# Patient Record
Sex: FEMALE | Race: WHITE | Employment: OTHER | URBAN - NONMETROPOLITAN AREA
[De-identification: names, ages, dates, MRNs, and addresses within clinical notes are randomized per-mention and may not be internally consistent; named-entity substitution may affect disease eponyms.]

---

## 2022-05-05 ENCOUNTER — OFFICE VISIT (OUTPATIENT)
Age: 82
End: 2022-05-05
Payer: MEDICARE

## 2022-05-05 ENCOUNTER — HOSPITAL ENCOUNTER (OUTPATIENT)
Dept: GENERAL RADIOLOGY | Age: 82
Discharge: HOME | End: 2022-05-05
Payer: MEDICARE

## 2022-05-05 VITALS
SYSTOLIC BLOOD PRESSURE: 130 MMHG | TEMPERATURE: 97.1 F | WEIGHT: 123 LBS | HEART RATE: 75 BPM | DIASTOLIC BLOOD PRESSURE: 76 MMHG | BODY MASS INDEX: 22.63 KG/M2 | HEIGHT: 62 IN | OXYGEN SATURATION: 96 %

## 2022-05-05 DIAGNOSIS — R05.9 COUGH: ICD-10-CM

## 2022-05-05 DIAGNOSIS — Z11.52 ENCOUNTER FOR SCREENING FOR COVID-19: ICD-10-CM

## 2022-05-05 DIAGNOSIS — J06.9 UPPER RESPIRATORY TRACT INFECTION, UNSPECIFIED TYPE: ICD-10-CM

## 2022-05-05 DIAGNOSIS — R05.9 COUGH: Primary | ICD-10-CM

## 2022-05-05 LAB — SARS-COV-2, PCR: NOT DETECTED

## 2022-05-05 PROCEDURE — 4004F PT TOBACCO SCREEN RCVD TLK: CPT | Performed by: NURSE PRACTITIONER

## 2022-05-05 PROCEDURE — 99213 OFFICE O/P EST LOW 20 MIN: CPT | Performed by: NURSE PRACTITIONER

## 2022-05-05 PROCEDURE — 1123F ACP DISCUSS/DSCN MKR DOCD: CPT | Performed by: NURSE PRACTITIONER

## 2022-05-05 PROCEDURE — 1090F PRES/ABSN URINE INCON ASSESS: CPT | Performed by: NURSE PRACTITIONER

## 2022-05-05 PROCEDURE — G8400 PT W/DXA NO RESULTS DOC: HCPCS | Performed by: NURSE PRACTITIONER

## 2022-05-05 PROCEDURE — 71046 X-RAY EXAM CHEST 2 VIEWS: CPT

## 2022-05-05 PROCEDURE — 4040F PNEUMOC VAC/ADMIN/RCVD: CPT | Performed by: NURSE PRACTITIONER

## 2022-05-05 PROCEDURE — G8420 CALC BMI NORM PARAMETERS: HCPCS | Performed by: NURSE PRACTITIONER

## 2022-05-05 PROCEDURE — G8427 DOCREV CUR MEDS BY ELIG CLIN: HCPCS | Performed by: NURSE PRACTITIONER

## 2022-05-05 RX ORDER — MEMANTINE HYDROCHLORIDE 10 MG/1
TABLET ORAL
COMMUNITY
Start: 2022-04-21

## 2022-05-05 RX ORDER — PROPRANOLOL HYDROCHLORIDE 20 MG/1
TABLET ORAL
COMMUNITY
Start: 2022-03-17

## 2022-05-05 RX ORDER — TRAMADOL HYDROCHLORIDE 50 MG/1
TABLET ORAL
COMMUNITY
Start: 2022-04-22

## 2022-05-05 RX ORDER — METHYLPREDNISOLONE 4 MG/1
TABLET ORAL
Qty: 1 KIT | Refills: 0 | Status: SHIPPED | OUTPATIENT
Start: 2022-05-05 | End: 2022-05-11

## 2022-05-05 RX ORDER — EZETIMIBE 10 MG/1
TABLET ORAL
COMMUNITY
Start: 2022-03-15

## 2022-05-05 ASSESSMENT — ENCOUNTER SYMPTOMS
STRIDOR: 0
COUGH: 1
WHEEZING: 0
SHORTNESS OF BREATH: 0
VOICE CHANGE: 0
CHEST TIGHTNESS: 0
SORE THROAT: 0
GASTROINTESTINAL NEGATIVE: 1
RHINORRHEA: 0
TROUBLE SWALLOWING: 0
CHOKING: 0
COLOR CHANGE: 0
SINUS PRESSURE: 0
EYES NEGATIVE: 1

## 2022-05-05 NOTE — PATIENT INSTRUCTIONS
1. Will call with results of covid. 2. Quarantine until covid results confirmed  3. Dont take steroid unless covid negative. 4. Will call with results of chest xray for further treatment recommendations.

## 2022-05-05 NOTE — PROGRESS NOTES
[address]  [address] [facility] [id number]  Dept: [phone]  Dept Fax: [phone]  Loc: [phone]     Oxana Cedillo is a 80 y.o. female who presents today for her medical conditions/complaintsas noted below. Oxana Cedillo is c/o of Cough (onset yesterday ), Congestion, and Fatigue        HPI:     HPI    URI  This is a new problem. The current episode started in the past 7 days. The problem occurs constantly. The problem has been unchanged. Associated symptoms include congestion, coughing and a sore throat. Pertinent negatives include  abdominal pain, anorexia, arthralgias, change in bowel habit, chest pain, chills, diaphoresis, fatigue, fever, headaches, joint swelling, myalgias, nausea, neck pain, numbness, rash, swollen glands, urinary symptoms, vertigo, visual change, vomiting or weakness. Pt tried OTC meds with mild symptom relief. Denies any other symptoms. No past medical history on file. No past surgical history on file. No family history on file. Social History     Tobacco Use    Smoking status: Not on file    Smokeless tobacco: Not on file   Substance Use Topics    Alcohol use: Not on file      Current Outpatient Medications   Medication Sig Dispense Refill    ezetimibe (ZETIA) 10 MG tablet       memantine (NAMENDA) 10 MG tablet       propranolol (INDERAL) 20 MG tablet       traMADol (ULTRAM) 50 MG tablet       methylPREDNISolone (MEDROL DOSEPACK) 4 MG tablet Take by mouth. 1 kit 0     No current facility-administered medications for this visit.      Allergies   Allergen Reactions    Sulfa Antibiotics        Health Maintenance   Topic Date Due    COVID-19 Vaccine (1) Never done    Depression Screen  Never done    Hepatitis C screen  Never done    DTaP/Tdap/Td vaccine (1 - Tdap) Never done    Shingles vaccine (1 of 2) Never done    DEXA (modify frequency per FRAX score)  Never done    Pneumococcal 65+ years Vaccine (1 - PCV) Never done    Flu vaccine (Season Ended) 09/01/2022    Hepatitis A vaccine  Aged Out    Hepatitis B vaccine  Aged Out    Hib vaccine  Aged Out    Meningococcal (ACWY) vaccine  Aged Out       Subjective:     Review of Systems   Constitutional: Positive for fatigue. Negative for fever. HENT: Positive for congestion. Negative for rhinorrhea, sinus pressure, sneezing, sore throat, trouble swallowing and voice change. Eyes: Negative. Respiratory: Positive for cough. Negative for choking, chest tightness, shortness of breath, wheezing and stridor. Cardiovascular: Negative. Gastrointestinal: Negative. Endocrine: Negative. Genitourinary: Negative. Musculoskeletal: Negative. Skin: Negative for color change. Neurological: Negative. Hematological: Negative. Psychiatric/Behavioral: Negative. Objective:     Physical Exam  Vitals and nursing note reviewed. Constitutional:       General: She is not in acute distress. Appearance: Normal appearance. She is well-developed. She is not ill-appearing or toxic-appearing. HENT:      Head: Normocephalic and atraumatic. Right Ear: Hearing, tympanic membrane, ear canal and external ear normal.      Left Ear: Hearing, tympanic membrane, ear canal and external ear normal.      Nose: Nose normal.      Right Sinus: No maxillary sinus tenderness or frontal sinus tenderness. Left Sinus: No maxillary sinus tenderness or frontal sinus tenderness. Mouth/Throat:      Lips: Pink. Mouth: Mucous membranes are moist.      Pharynx: Oropharynx is clear. Tonsils: 0 on the right. 0 on the left. Eyes:      Conjunctiva/sclera: Conjunctivae normal.      Pupils: Pupils are equal, round, and reactive to light. Neck:      Thyroid: No thyroid mass. Trachea: Trachea normal.   Cardiovascular:      Rate and Rhythm: Normal rate and regular rhythm. Heart sounds: Normal heart sounds. Pulmonary:      Effort: Pulmonary effort is normal.      Breath sounds: Normal breath sounds. Abdominal:      General: Bowel sounds are normal.      Palpations: Abdomen is soft. Musculoskeletal:         General: Normal range of motion. Cervical back: Normal range of motion. Lymphadenopathy:      Head:      Right side of head: No submental, submandibular, tonsillar, preauricular, posterior auricular or occipital adenopathy. Left side of head: No submental, submandibular, tonsillar, preauricular, posterior auricular or occipital adenopathy. Skin:     General: Skin is warm and moist.      Capillary Refill: Capillary refill takes less than 2 seconds. Neurological:      Mental Status: She is alert and oriented to person, place, and time. Psychiatric:         Speech: Speech normal.         Behavior: Behavior normal.         Thought Content: Thought content normal.         Judgment: Judgment normal.       /76   Pulse 75   Temp 97.1 °F (36.2 °C) (Temporal)   Ht 5' 2\" (1.575 m)   Wt 123 lb (55.8 kg)   SpO2 96%   BMI 22.50 kg/m²     Assessment:          Diagnosis Orders   1. Cough  XR CHEST STANDARD (2 VW)    methylPREDNISolone (MEDROL DOSEPACK) 4 MG tablet   2. Encounter for screening for COVID-19  COVID-19   3. Upper respiratory tract infection, unspecified type         Plan:      Orders Placed This Encounter   Procedures    XR CHEST STANDARD (2 VW)     Standing Status:   Future     Number of Occurrences:   1     Standing Expiration Date:   5/5/2023    COVID-19     Scheduling Instructions:      1) Due to current limited availability of the COVID-19 test, tests will be prioritized based on responses to questions above. Testing may be delayed due to volume.             2) Print and instruct patient to adhere to [facility] home isolation program. (Link Above)              3) Set up or refer patient for a monitoring program.              4) Have patient sign up for and leverage [employer] (if not previously done). Order Specific Question:   Is this test for diagnosis or screening? Answer:   Screening     Order Specific Question:   Symptomatic for COVID-19 as defined by CDC? Answer:   No     Order Specific Question:   Date of Symptom Onset     Answer:   N/A     Order Specific Question:   Hospitalized for COVID-19? Answer:   No     Order Specific Question:   Admitted to ICU for COVID-19? Answer:   No     Order Specific Question:   Employed in healthcare setting? Answer:   Unknown     Order Specific Question:   Resident in a congregate (group) care setting? Answer:   Unknown     Order Specific Question:   Pregnant? Answer:   No     Order Specific Question:   Previously tested for COVID-19? Answer:   Unknown    COVID-19    COVID-19        No follow-ups on file. Orders Placed This Encounter   Procedures    XR CHEST STANDARD (2 VW)     Standing Status:   Future     Number of Occurrences:   1     Standing Expiration Date:   5/5/2023    COVID-19     Scheduling Instructions:      1) Due to current limited availability of the COVID-19 test, tests will be prioritized based on responses to questions above. Testing may be delayed due to volume. 2) Print and instruct patient to adhere to CDC home isolation program. (Link Above)              3) Set up or refer patient for a monitoring program.              4) Have patient sign up for and leverage [id number] (if not previously done). Order Specific Question:   Is this test for diagnosis or screening? Answer:   Screening     Order Specific Question:   Symptomatic for COVID-19 as defined by CDC? Answer:   No     Order Specific Question:   Date of Symptom Onset     Answer:   N/A     Order Specific Question:   Hospitalized for COVID-19? Answer:   No     Order Specific Question:   Admitted to ICU for COVID-19? Answer:   No     Order Specific Question:   Employed in healthcare setting?      Answer:   Unknown Order Specific Question:   Resident in a congregate (group) care setting? Answer:   Unknown     Order Specific Question:   Pregnant? Answer:   No     Order Specific Question:   Previously tested for COVID-19? Answer:   Unknown    COVID-19    COVID-19     Orders Placed This Encounter   Medications    methylPREDNISolone (MEDROL DOSEPACK) 4 MG tablet     Sig: Take by mouth. Dispense:  1 kit     Refill:  0       Patient given educationalmaterials - see patient instructions. Discussed use, benefit, and side effectsof prescribed medications. All patient questions answered. Pt voiced understanding. Reviewed health maintenance. Instructed to continue current medications, diet andexercise. Patient agreed with treatment plan. Follow up as directed. Patient Instructions   1. Will call with results of covid. 2. Quarantine until covid results confirmed  3. Dont take steroid unless covid negative. 4. Will call with results of chest xray for further treatment recommendations.            Electronically signed by SALTY Ashford CNP on 5/5/2022 at 3:38 PM

## 2022-06-24 ENCOUNTER — OFFICE VISIT (OUTPATIENT)
Age: 82
End: 2022-06-24
Payer: MEDICARE

## 2022-06-24 VITALS
RESPIRATION RATE: 18 BRPM | WEIGHT: 122 LBS | BODY MASS INDEX: 22.45 KG/M2 | HEART RATE: 63 BPM | DIASTOLIC BLOOD PRESSURE: 66 MMHG | OXYGEN SATURATION: 96 % | SYSTOLIC BLOOD PRESSURE: 116 MMHG | HEIGHT: 62 IN | TEMPERATURE: 97.1 F

## 2022-06-24 DIAGNOSIS — H66.92 LEFT ACUTE OTITIS MEDIA: Primary | ICD-10-CM

## 2022-06-24 PROCEDURE — 1036F TOBACCO NON-USER: CPT | Performed by: NURSE PRACTITIONER

## 2022-06-24 PROCEDURE — G8400 PT W/DXA NO RESULTS DOC: HCPCS | Performed by: NURSE PRACTITIONER

## 2022-06-24 PROCEDURE — 99213 OFFICE O/P EST LOW 20 MIN: CPT | Performed by: NURSE PRACTITIONER

## 2022-06-24 PROCEDURE — 1123F ACP DISCUSS/DSCN MKR DOCD: CPT | Performed by: NURSE PRACTITIONER

## 2022-06-24 PROCEDURE — G8427 DOCREV CUR MEDS BY ELIG CLIN: HCPCS | Performed by: NURSE PRACTITIONER

## 2022-06-24 PROCEDURE — 1090F PRES/ABSN URINE INCON ASSESS: CPT | Performed by: NURSE PRACTITIONER

## 2022-06-24 PROCEDURE — G8420 CALC BMI NORM PARAMETERS: HCPCS | Performed by: NURSE PRACTITIONER

## 2022-06-24 RX ORDER — SIMETHICONE 125 MG
3 CAPSULE ORAL
COMMUNITY

## 2022-06-24 RX ORDER — AMOXICILLIN AND CLAVULANATE POTASSIUM 875; 125 MG/1; MG/1
1 TABLET, FILM COATED ORAL 2 TIMES DAILY
Qty: 20 TABLET | Refills: 0 | Status: SHIPPED | OUTPATIENT
Start: 2022-06-24 | End: 2022-07-04

## 2022-06-24 RX ORDER — IBUPROFEN 200 MG
1900 CAPSULE ORAL
COMMUNITY

## 2022-06-24 RX ORDER — OMEGA-3/DHA/EPA/FISH OIL 300-1000MG
CAPSULE ORAL DAILY
COMMUNITY

## 2022-06-24 RX ORDER — LEVOCETIRIZINE DIHYDROCHLORIDE 2.5 MG/5ML
SOLUTION ORAL DAILY
COMMUNITY

## 2022-06-24 RX ORDER — ALBUTEROL SULFATE 2.5 MG/3ML
2.5 SOLUTION RESPIRATORY (INHALATION) EVERY 6 HOURS PRN
COMMUNITY
End: 2022-07-25 | Stop reason: ALTCHOICE

## 2022-06-24 ASSESSMENT — ENCOUNTER SYMPTOMS
COUGH: 0
SORE THROAT: 0

## 2022-06-24 NOTE — PROGRESS NOTES
[address]  [address] [facility] [address] [address]  Dept: [phone]  Dept Fax: [phone]  Loc: [phone]    Josie Galvan is a 80 y.o. female who presents today for her medical conditions/complaintsas noted below. Josie Galvan is c/o of Otalgia (left side,facial swelling)        HPI:     Otalgia   There is pain in the left ear. This is a new problem. Episode onset: 3 days. The problem occurs constantly. The problem has been unchanged. There has been no fever. The pain is moderate. Pertinent negatives include no coughing, ear discharge or sore throat. Treatments tried: flonase and antihistamine. The treatment provided no relief. History reviewed. No pertinent past medical history. History reviewed. No pertinent surgical history. History reviewed. No pertinent family history.     Social History     Tobacco Use    Smoking status: Never Smoker    Smokeless tobacco: Never Used   Substance Use Topics    Alcohol use: Not on file      Current Outpatient Medications   Medication Sig Dispense Refill    Alpha-Lipoic Acid 100 MG TABS Take 200 mg by mouth every morning      calcium citrate (CALCITRATE) 950 (200 Ca) MG tablet Take 1,900 mg by mouth      Apoaequorin 10 MG CAPS Take 70 mg by mouth daily      Levocetirizine Dihydrochloride 2.5 MG/5ML SOLN Take by mouth daily      fish oil-omega-3 fatty acids 1000 MG capsule Take by mouth daily      Peppermint Oil 90 MG CPCR Take by mouth 2 times daily      Simethicone 125 MG CAPS Take 3 tablets by mouth      amoxicillin-clavulanate (AUGMENTIN) 875-125 MG per tablet Take 1 tablet by mouth 2 times daily for 10 days 20 tablet 0    ezetimibe (ZETIA) 10 MG tablet       memantine (NAMENDA) 10 MG tablet       propranolol (INDERAL) 20 MG tablet       traMADol (ULTRAM) 50 MG tablet       albuterol (PROVENTIL) (2.5 MG/3ML) 0.083% nebulizer solution Inhale 2.5 mg into the lungs every 6 hours as needed (Patient not taking: Reported on 6/24/2022)       No current facility-administered medications for this visit. Allergies   Allergen Reactions    Sulfa Antibiotics        Health Maintenance   Topic Date Due    Annual Wellness Visit (AWV)  Never done    Depression Screen  Never done    Hepatitis C screen  Never done    DTaP/Tdap/Td vaccine (1 - Tdap) Never done    Shingles vaccine (1 of 2) Never done    DEXA (modify frequency per FRAX score)  Never done    Pneumococcal 65+ years Vaccine (2 - PPSV23 or PCV20) 06/09/2019    COVID-19 Vaccine (3 - Booster for Moderna series) 07/18/2021    Flu vaccine (Season Ended) 09/01/2022    Hepatitis A vaccine  Aged Out    Hepatitis B vaccine  Aged Out    Hib vaccine  Aged Out    Meningococcal (ACWY) vaccine  Aged Out       Subjective:     Review of Systems   Constitutional: Negative for chills and fever. HENT: Positive for congestion and ear pain. Negative for ear discharge and sore throat. Respiratory: Negative for cough. All other systems reviewed and are negative.      :Objective      Physical Exam  Vitals and nursing note reviewed. Constitutional:       General: She is not in acute distress. Appearance: Normal appearance. She is well-developed. She is not ill-appearing or diaphoretic. HENT:      Head: Normocephalic and atraumatic. Right Ear: Ear canal and external ear normal. A middle ear effusion is present. Left Ear: Ear canal and external ear normal. A middle ear effusion is present. Tympanic membrane is erythematous. Eyes:      Pupils: Pupils are equal, round, and reactive to light. Cardiovascular:      Rate and Rhythm: Normal rate and regular rhythm. Heart sounds: Normal heart sounds. No murmur heard. Pulmonary:      Effort: Pulmonary effort is normal. No respiratory distress. Breath sounds: Normal breath sounds. No wheezing. Musculoskeletal:      Cervical back: Normal range of motion.    Skin: General: Skin is warm and dry. Findings: No rash. Neurological:      Mental Status: She is alert and oriented to person, place, and time. Psychiatric:         Behavior: Behavior normal.       /66   Pulse 63   Temp 97.1 °F (36.2 °C)   Resp 18   Ht 5' 2\" (1.575 m)   Wt 122 lb (55.3 kg)   SpO2 96%   BMI 22.31 kg/m²     :Assessment       Diagnosis Orders   1. Left acute otitis media         :Plan   1. Take full course of antibiotics  2. Monitor for fever and treat as needed  3. If patient is not improving or developing any new/worsening symptoms then return to clinic as needed or follow up with PCP     No orders of the defined types were placed in this encounter. No follow-ups on file. Orders Placed This Encounter   Medications    amoxicillin-clavulanate (AUGMENTIN) 875-125 MG per tablet     Sig: Take 1 tablet by mouth 2 times daily for 10 days     Dispense:  20 tablet     Refill:  0       Patient given educational materials- see patient instructions. Discussed use, benefit, and side effects of prescribedmedications. All patient questions answered. Pt voiced understanding. Patient Instructions       Patient Education        Ear Infection (Otitis Media): Care Instructions  Overview     An ear infection may start with a cold and affect the middle ear (otitis media). It can hurt a lot. Most ear infections clear up on their own in a couple of days and do not need antibiotics. Also, antibiotics do not work against viruses, which may be the cause of your infection. Regular doses ofpain relievers are the best way to reduce your fever and help you feel better. Follow-up care is a key part of your treatment and safety. Be sure to make and go to all appointments, and call your doctor if you are having problems. It's also a good idea to know your test results and keep alist of the medicines you take. How can you care for yourself at home?  Take pain medicines exactly as directed.   ? If the doctor gave you a prescription medicine for pain, take it as prescribed. ? If you are not taking a prescription pain medicine, take an over-the-counter medicine, such as acetaminophen (Tylenol), ibuprofen (Advil, Motrin), or naproxen (Aleve). Read and follow all instructions on the label. ? Do not take two or more pain medicines at the same time unless the doctor told you to. Many pain medicines have acetaminophen, which is Tylenol. Too much acetaminophen (Tylenol) can be harmful.  Plan to take a full dose of pain reliever before bedtime. Getting enough sleep will help you get better.  Try a warm, moist washcloth on the ear. It may help relieve pain.  If your doctor prescribed antibiotics, take them as directed. Do not stop taking them just because you feel better. You need to take the full course of antibiotics. When should you call for help? Call your doctor now or seek immediate medical care if:     You have new or increasing ear pain.      You have new or increasing pus or blood draining from your ear.      You have a fever with a stiff neck or a severe headache. Watch closely for changes in your health, and be sure to contact your doctor if:     You have new or worse symptoms.      You are not getting better after taking an antibiotic for 2 days. Where can you learn more? Go to https://[employer]ed[employer]world.[employer]. org and sign in to your [employer] account. Enter F165 in the [facility] box to learn more about \"Ear Infection (Otitis Media): Care Instructions. \"     If you do not have an account, please click on the \"Sign Up Now\" link. Current as of: September 8, 2021               Content Version: 13.3  © 5186-6254 Healthwise, Incorporated. Care instructions adapted under license by [facility] ([facility]).  If you have questions about a medical condition or this instruction, always ask your healthcare professional. [address]  any warranty or liability for your use of this information. 1. Take full course of antibiotics  2. Monitor for fever and treat as needed  3.  If patient is not improving or developing any new/worsening symptoms then return to clinic as needed or follow up with PCP             Electronically signed by SALTY Armendariz on 6/24/2022 at 8:21 AM

## 2022-06-24 NOTE — PATIENT INSTRUCTIONS
Patient Education        Ear Infection (Otitis Media): Care Instructions  Overview     An ear infection may start with a cold and affect the middle ear (otitis media). It can hurt a lot. Most ear infections clear up on their own in a couple of days and do not need antibiotics. Also, antibiotics do not work against viruses, which may be the cause of your infection. Regular doses ofpain relievers are the best way to reduce your fever and help you feel better. Follow-up care is a key part of your treatment and safety. Be sure to make and go to all appointments, and call your doctor if you are having problems. It's also a good idea to know your test results and keep alist of the medicines you take. How can you care for yourself at home?  Take pain medicines exactly as directed. ? If the doctor gave you a prescription medicine for pain, take it as prescribed. ? If you are not taking a prescription pain medicine, take an over-the-counter medicine, such as acetaminophen (Tylenol), ibuprofen (Advil, Motrin), or naproxen (Aleve). Read and follow all instructions on the label. ? Do not take two or more pain medicines at the same time unless the doctor told you to. Many pain medicines have acetaminophen, which is Tylenol. Too much acetaminophen (Tylenol) can be harmful.  Plan to take a full dose of pain reliever before bedtime. Getting enough sleep will help you get better.  Try a warm, moist washcloth on the ear. It may help relieve pain.  If your doctor prescribed antibiotics, take them as directed. Do not stop taking them just because you feel better. You need to take the full course of antibiotics. When should you call for help? Call your doctor now or seek immediate medical care if:     You have new or increasing ear pain.      You have new or increasing pus or blood draining from your ear.      You have a fever with a stiff neck or a severe headache.    Watch closely for changes in your health, and be sure to contact your doctor if:     You have new or worse symptoms.      You are not getting better after taking an antibiotic for 2 days. Where can you learn more? Go to https://chpepiceweb.[employer]. org and sign in to your [employer] account. Enter Q415 in the [facility] box to learn more about \"Ear Infection (Otitis Media): Care Instructions. \"     If you do not have an account, please click on the \"Sign Up Now\" link. Current as of: September 8, 2021               Content Version: 13.3  © 3864-9481 Healthwise, Incorporated. Care instructions adapted under license by [facility] ([facility]). If you have questions about a medical condition or this instruction, always ask your healthcare professional. [address] any warranty or liability for your use of this information. 1. Take full course of antibiotics  2. Monitor for fever and treat as needed  3.  If patient is not improving or developing any new/worsening symptoms then return to clinic as needed or follow up with PCP

## 2022-07-25 ENCOUNTER — OFFICE VISIT (OUTPATIENT)
Dept: PRIMARY CARE CLINIC | Age: 82
End: 2022-07-25
Payer: MEDICARE

## 2022-07-25 VITALS
OXYGEN SATURATION: 96 % | WEIGHT: 127 LBS | BODY MASS INDEX: 23.37 KG/M2 | HEIGHT: 62 IN | SYSTOLIC BLOOD PRESSURE: 130 MMHG | HEART RATE: 74 BPM | DIASTOLIC BLOOD PRESSURE: 70 MMHG

## 2022-07-25 DIAGNOSIS — M48.061 NEURAL FORAMINAL STENOSIS OF LUMBAR SPINE: ICD-10-CM

## 2022-07-25 DIAGNOSIS — Z13.29 SCREENING FOR THYROID DISORDER: ICD-10-CM

## 2022-07-25 DIAGNOSIS — Z13.220 SCREENING FOR LIPID DISORDERS: ICD-10-CM

## 2022-07-25 DIAGNOSIS — Z13.0 SCREENING FOR DEFICIENCY ANEMIA: ICD-10-CM

## 2022-07-25 DIAGNOSIS — J06.9 VIRAL UPPER RESPIRATORY TRACT INFECTION: Primary | ICD-10-CM

## 2022-07-25 DIAGNOSIS — K44.9 HIATAL HERNIA WITH GASTROESOPHAGEAL REFLUX: ICD-10-CM

## 2022-07-25 DIAGNOSIS — K21.9 HIATAL HERNIA WITH GASTROESOPHAGEAL REFLUX: ICD-10-CM

## 2022-07-25 DIAGNOSIS — M81.0 AGE-RELATED OSTEOPOROSIS WITHOUT CURRENT PATHOLOGICAL FRACTURE: ICD-10-CM

## 2022-07-25 DIAGNOSIS — Z13.1 DIABETES MELLITUS SCREENING: ICD-10-CM

## 2022-07-25 DIAGNOSIS — E78.2 MIXED HYPERLIPIDEMIA: ICD-10-CM

## 2022-07-25 DIAGNOSIS — E55.9 HYPOVITAMINOSIS D: ICD-10-CM

## 2022-07-25 DIAGNOSIS — D18.03 HEPATIC HEMANGIOMA: ICD-10-CM

## 2022-07-25 PROBLEM — E87.1 HYPONATREMIA: Status: ACTIVE | Noted: 2021-07-27

## 2022-07-25 PROBLEM — Q25.72: Status: ACTIVE | Noted: 2018-02-13

## 2022-07-25 PROBLEM — M47.816 FACET ARTHRITIS OF LUMBAR REGION: Status: ACTIVE | Noted: 2018-12-18

## 2022-07-25 PROBLEM — J30.9 ALLERGIC RHINITIS: Status: ACTIVE | Noted: 2021-01-11

## 2022-07-25 PROCEDURE — G8400 PT W/DXA NO RESULTS DOC: HCPCS | Performed by: INTERNAL MEDICINE

## 2022-07-25 PROCEDURE — 99203 OFFICE O/P NEW LOW 30 MIN: CPT | Performed by: INTERNAL MEDICINE

## 2022-07-25 PROCEDURE — 1090F PRES/ABSN URINE INCON ASSESS: CPT | Performed by: INTERNAL MEDICINE

## 2022-07-25 PROCEDURE — G8427 DOCREV CUR MEDS BY ELIG CLIN: HCPCS | Performed by: INTERNAL MEDICINE

## 2022-07-25 PROCEDURE — 1123F ACP DISCUSS/DSCN MKR DOCD: CPT | Performed by: INTERNAL MEDICINE

## 2022-07-25 PROCEDURE — 1036F TOBACCO NON-USER: CPT | Performed by: INTERNAL MEDICINE

## 2022-07-25 PROCEDURE — G8420 CALC BMI NORM PARAMETERS: HCPCS | Performed by: INTERNAL MEDICINE

## 2022-07-25 SDOH — ECONOMIC STABILITY: FOOD INSECURITY: WITHIN THE PAST 12 MONTHS, THE FOOD YOU BOUGHT JUST DIDN'T LAST AND YOU DIDN'T HAVE MONEY TO GET MORE.: NEVER TRUE

## 2022-07-25 SDOH — ECONOMIC STABILITY: FOOD INSECURITY: WITHIN THE PAST 12 MONTHS, YOU WORRIED THAT YOUR FOOD WOULD RUN OUT BEFORE YOU GOT MONEY TO BUY MORE.: NEVER TRUE

## 2022-07-25 ASSESSMENT — PATIENT HEALTH QUESTIONNAIRE - PHQ9
1. LITTLE INTEREST OR PLEASURE IN DOING THINGS: 0
2. FEELING DOWN, DEPRESSED OR HOPELESS: 0
SUM OF ALL RESPONSES TO PHQ9 QUESTIONS 1 & 2: 0
SUM OF ALL RESPONSES TO PHQ QUESTIONS 1-9: 0

## 2022-07-25 ASSESSMENT — SOCIAL DETERMINANTS OF HEALTH (SDOH): HOW HARD IS IT FOR YOU TO PAY FOR THE VERY BASICS LIKE FOOD, HOUSING, MEDICAL CARE, AND HEATING?: NOT HARD AT ALL

## 2022-07-25 NOTE — PROGRESS NOTES
Zaynab Stokes ( 1940) is a 80 y.o. female, NEW  for evaluation of the following chief complaint(s). New Patient (Dr Dequan Genao doctor [facility]))      Patient was encouraged and advised to be compliant with all  medications leads an active lifestyle and promote maintaining a healthy weight, encouraged not to use cigarettes, laboratory results discussed and reviewed with patient's during this visit   ASSESSMENT/PLAN:  Problem List          High    Hyperlipidemia      Well-controlled, continue current medications, medication adherence emphasized and lifestyle modifications recommended         Relevant Medications    ezetimibe (ZETIA) 10 MG tablet    propranolol (INDERAL) 20 MG tablet    Hiatal hernia with gastroesophageal reflux      Well-controlled, continue current medications         Relevant Medications    Simethicone 125 MG CAPS    Hepatic hemangioma      Asymptomatic, will observe         Age-related osteoporosis without current pathological fracture      Asymptomatic, continue current medications         Relevant Orders    COVID-19       Medium    Neural foraminal stenosis of lumbar spine    Relevant Orders    External Referral To Pain Clinic         No flowsheet data found. PHQ Scores 2022   PHQ2 Score 0   PHQ9 Score 0       Results for orders placed or performed in visit on 22   COVID-19   Result Value Ref Range    SARS-CoV-2, PCR Not Detected Not Detected       No follow-ups on file. HPI  NPV  Relocated from [facility], now here  She is independent, performs ADL's and IADL's,   Here to establish care  She has not seen an MD for sometime  She like to take homeopathic remedies  Had probiotic yesterday, and was awakened by spasm,   Review of Systems   Constitutional:  Negative for activity change, chills, fatigue and fever.    HENT:  Negative for congestion, ear pain, hearing loss, mouth sores, nosebleeds, postnasal drip, rhinorrhea, sinus pain, sore throat and trouble swallowing. Eyes:  Negative for visual disturbance. Respiratory:  Negative for apnea, cough, chest tightness, shortness of breath and wheezing. Cardiovascular:  Negative for chest pain, palpitations and leg swelling. Gastrointestinal:  Negative for abdominal distention, abdominal pain, blood in stool, constipation, diarrhea, nausea and vomiting. Heartburn   Endocrine: Negative for cold intolerance, heat intolerance and polyuria. Genitourinary:  Negative for difficulty urinating, dysuria, flank pain, frequency and urgency. Musculoskeletal:  Positive for back pain. Negative for arthralgias and myalgias. Skin:  Negative for rash and wound. Allergic/Immunologic: Negative for environmental allergies and food allergies. Neurological:  Negative for dizziness, tremors, seizures, syncope, speech difficulty, weakness, light-headedness, numbness and headaches. Hematological:  Negative for adenopathy. Does not bruise/bleed easily. Psychiatric/Behavioral:  Negative for confusion, decreased concentration, dysphoric mood, sleep disturbance and suicidal ideas. The patient is not nervous/anxious and is not hyperactive. Physical Exam  Vitals and nursing note reviewed. Constitutional:       General: She is not in acute distress. Appearance: Normal appearance. HENT:      Head: Normocephalic. Right Ear: Ear canal and external ear normal.      Left Ear: Ear canal and external ear normal.      Nose: Nose normal. No congestion or rhinorrhea. Mouth/Throat:      Mouth: Mucous membranes are moist.      Pharynx: No oropharyngeal exudate or posterior oropharyngeal erythema. Eyes:      General: No scleral icterus. Right eye: No discharge. Left eye: No discharge. Extraocular Movements: Extraocular movements intact. Conjunctiva/sclera: Conjunctivae normal.      Pupils: Pupils are equal, round, and reactive to light. Neck:      Thyroid: No thyromegaly.       Vascular: No carotid bruit or JVD. Cardiovascular:      Rate and Rhythm: Normal rate and regular rhythm. Chest Wall: PMI is not displaced. Pulses: Normal pulses. Heart sounds: Normal heart sounds, S1 normal and S2 normal. No murmur heard. Pulmonary:      Effort: Pulmonary effort is normal. No respiratory distress. Breath sounds: Normal breath sounds and air entry. No decreased air movement or transmitted upper airway sounds. No decreased breath sounds, wheezing or rales. Abdominal:      General: Abdomen is flat. Bowel sounds are normal. There is no distension. Palpations: Abdomen is soft. There is no shifting dullness, hepatomegaly, splenomegaly or mass. Tenderness: There is no abdominal tenderness. There is no right CVA tenderness, left CVA tenderness, guarding or rebound. Hernia: No hernia is present. Musculoskeletal:         General: Tenderness present. No deformity. Normal range of motion. Right shoulder: Normal.      Left shoulder: Normal.      Right elbow: Normal.      Left elbow: Normal.      Right wrist: Normal.      Left wrist: Normal.      Cervical back: Normal, normal range of motion and neck supple. No rigidity. Thoracic back: Normal.      Lumbar back: Normal.      Right knee: Normal.      Left knee: Normal.      Right lower leg: No edema. Left lower leg: No edema. Right ankle: Normal.      Left ankle: Normal.   Lymphadenopathy:      Cervical: No cervical adenopathy. Skin:     General: Skin is warm and moist.      Findings: No lesion or rash. Neurological:      Mental Status: She is alert. Mental status is at baseline. Cranial Nerves: Cranial nerves are intact. No cranial nerve deficit. Motor: Motor function is intact. No weakness. Coordination: Coordination is intact. Gait: Gait is intact.  Gait normal.      Deep Tendon Reflexes: Reflexes normal.   Psychiatric:         Attention and Perception: Attention normal.         Speech: Speech normal.         Behavior: Behavior normal.         Thought Content:  Thought content normal.         Judgment: Judgment normal.         (Time Documentation Optional [id number])    An electronic signaturewaas used to authenticate this note  -Shreyas Sanders MD on 7/31/2022 at 4:10 PM

## 2022-07-31 ASSESSMENT — ENCOUNTER SYMPTOMS
NAUSEA: 0
RHINORRHEA: 0
SORE THROAT: 0
ABDOMINAL PAIN: 0
ABDOMINAL DISTENTION: 0
BLOOD IN STOOL: 0
BACK PAIN: 1
VOMITING: 0
SHORTNESS OF BREATH: 0
COUGH: 0
APNEA: 0
DIARRHEA: 0
CONSTIPATION: 0
TROUBLE SWALLOWING: 0
WHEEZING: 0
SINUS PAIN: 0
CHEST TIGHTNESS: 0

## 2022-08-25 ENCOUNTER — OFFICE VISIT (OUTPATIENT)
Dept: PRIMARY CARE CLINIC | Age: 82
End: 2022-08-25
Payer: MEDICARE

## 2022-08-25 VITALS
DIASTOLIC BLOOD PRESSURE: 74 MMHG | OXYGEN SATURATION: 95 % | WEIGHT: 127 LBS | TEMPERATURE: 98.6 F | HEART RATE: 64 BPM | BODY MASS INDEX: 23.37 KG/M2 | HEIGHT: 62 IN | SYSTOLIC BLOOD PRESSURE: 128 MMHG

## 2022-08-25 DIAGNOSIS — Z23 NEED FOR VACCINATION: ICD-10-CM

## 2022-08-25 DIAGNOSIS — H92.01 OTALGIA OF RIGHT EAR: ICD-10-CM

## 2022-08-25 DIAGNOSIS — S40.022A CONTUSION OF LEFT UPPER EXTREMITY, INITIAL ENCOUNTER: Primary | ICD-10-CM

## 2022-08-25 PROCEDURE — G8420 CALC BMI NORM PARAMETERS: HCPCS | Performed by: FAMILY MEDICINE

## 2022-08-25 PROCEDURE — 90471 IMMUNIZATION ADMIN: CPT | Performed by: FAMILY MEDICINE

## 2022-08-25 PROCEDURE — 1123F ACP DISCUSS/DSCN MKR DOCD: CPT | Performed by: FAMILY MEDICINE

## 2022-08-25 PROCEDURE — 90750 HZV VACC RECOMBINANT IM: CPT | Performed by: FAMILY MEDICINE

## 2022-08-25 PROCEDURE — G8427 DOCREV CUR MEDS BY ELIG CLIN: HCPCS | Performed by: FAMILY MEDICINE

## 2022-08-25 PROCEDURE — 99213 OFFICE O/P EST LOW 20 MIN: CPT | Performed by: FAMILY MEDICINE

## 2022-08-25 PROCEDURE — G8400 PT W/DXA NO RESULTS DOC: HCPCS | Performed by: FAMILY MEDICINE

## 2022-08-25 PROCEDURE — 1036F TOBACCO NON-USER: CPT | Performed by: FAMILY MEDICINE

## 2022-08-25 PROCEDURE — 1090F PRES/ABSN URINE INCON ASSESS: CPT | Performed by: FAMILY MEDICINE

## 2022-08-25 ASSESSMENT — ENCOUNTER SYMPTOMS
EYES NEGATIVE: 1
RESPIRATORY NEGATIVE: 1
GASTROINTESTINAL NEGATIVE: 1

## 2022-08-25 NOTE — PROGRESS NOTES
[address] [facility]  [address] [address]  [address] [address]  Dept: [phone]  Dept Fax: [phone]  Loc: [phone]      Subjective:     Chief Complaint   Patient presents with    Hematoma     Left forearm       HPI:  Kim Dunn is a 80 y.o. female presents with a bruise in her L forearm. She does not remember trauma to the area. Last platelet count was normal. She has pending BW that has not been done yet. This was ordered by her PCP, Dr Charla Armendariz  She c/o on & off  pain in her L ear. No drainage. No fever. She has no pain today. ROS:   Review of Systems   Constitutional: Negative. HENT:  Positive for ear pain. Eyes: Negative. Respiratory: Negative. Cardiovascular: Negative. Gastrointestinal: Negative. Endocrine: Negative. Genitourinary: Negative. Musculoskeletal: Negative. Neurological: Negative. Hematological:  Bruises/bleeds easily. Psychiatric/Behavioral: Negative. PMHx:  No past medical history on file. Patient Active Problem List   Diagnosis    Allergic rhinitis    Congenital pulmonary arteriovenous shunt    Facet arthritis of lumbar region    Hepatic hemangioma    Hiatal hernia with gastroesophageal reflux    Hyperlipidemia    Hyponatremia    Migraine    Neural foraminal stenosis of lumbar spine    Age-related osteoporosis without current pathological fracture       PSHx:  No past surgical history on file. PFHx:  Family History   Problem Relation Age of Onset    Cancer Maternal Grandmother     Cancer Maternal Grandfather     Cancer Paternal Grandmother        SocialHx:  Social History     Tobacco Use    Smoking status: Never    Smokeless tobacco: Never   Substance Use Topics    Alcohol use: Not on file       Allergies:   Allergies   Allergen Reactions    Sulfa Antibiotics        Medications:  Current Outpatient Medications   Medication Sig Dispense Refill    Alpha-Lipoic Acid 100 MG TABS Take 200 mg by mouth every morning      calcium citrate (CALCITRATE) 950 (200 Ca) MG tablet Take 1,900 mg by mouth      Apoaequorin 10 MG CAPS Take 70 mg by mouth daily      Levocetirizine Dihydrochloride 2.5 MG/5ML SOLN Take by mouth daily      fish oil-omega-3 fatty acids 1000 MG capsule Take by mouth daily      Peppermint Oil 90 MG CPCR Take by mouth 2 times daily      Simethicone 125 MG CAPS Take 3 tablets by mouth      ezetimibe (ZETIA) 10 MG tablet       memantine (NAMENDA) 10 MG tablet       propranolol (INDERAL) 20 MG tablet       traMADol (ULTRAM) 50 MG tablet        No current facility-administered medications for this visit. Objective:   PE:  /74   Pulse 64   Temp 98.6 °F (37 °C) (Temporal)   Ht 5' 2\" (1.575 m)   Wt 127 lb (57.6 kg)   SpO2 95%   BMI 23.23 kg/m²   Physical Exam  Vitals and nursing note reviewed. Constitutional:       General: She is not in acute distress. Appearance: Normal appearance. HENT:      Head: Normocephalic. Right Ear: Tympanic membrane, ear canal and external ear normal.      Left Ear: Tympanic membrane, ear canal and external ear normal.      Nose: No congestion. Mouth/Throat:      Mouth: Mucous membranes are moist.      Pharynx: No oropharyngeal exudate. Eyes:      General: No scleral icterus. Extraocular Movements: Extraocular movements intact. Conjunctiva/sclera: Conjunctivae normal.      Pupils: Pupils are equal, round, and reactive to light. Neck:      Thyroid: No thyromegaly. Vascular: No carotid bruit or JVD. Cardiovascular:      Rate and Rhythm: Normal rate and regular rhythm. Chest Wall: PMI is not displaced. Pulses: Normal pulses. Heart sounds: Normal heart sounds, S1 normal and S2 normal. No murmur heard. Pulmonary:      Effort: Pulmonary effort is normal.      Breath sounds: Normal breath sounds and air entry. No decreased air movement or transmitted upper airway sounds. No decreased breath sounds.    Abdominal: General: Abdomen is flat. Bowel sounds are normal.      Palpations: Abdomen is soft. There is no shifting dullness, hepatomegaly or splenomegaly. Tenderness: There is no abdominal tenderness. Musculoskeletal:         General: Normal range of motion. Cervical back: Normal range of motion and neck supple. Right ankle: Normal.      Left ankle: Normal.   Lymphadenopathy:      Cervical: No cervical adenopathy. Skin:     General: Skin is warm and dry. Findings: Bruising (quarter size bruise in the L forearm,non tender to touch) present. Neurological:      General: No focal deficit present. Mental Status: She is alert and oriented to person, place, and time. Cranial Nerves: Cranial nerves are intact. Motor: Motor function is intact. Coordination: Coordination is intact. Gait: Gait is intact. Psychiatric:         Attention and Perception: Attention normal.         Mood and Affect: Mood normal.         Speech: Speech normal.         Behavior: Behavior normal.          Assessment & Plan   Ibeth Vinson was seen today for hematoma. Diagnoses and all orders for this visit:    Contusion of left upper extremity, initial encounter    Need for vaccination  -     Zoster, SHINGRIX, (18 yrs +), IM    Otalgia of right ear    Reassured   Instructed to get blood work ordered by Dr Maxwell English all maintenance medication  R otalgia most likely sec to eustachian tube dysfunction  Call with new concerns    Return in 3 months (on 12/5/2022) for routine preventative visit, routine follow-up with Dr Raine June. All questions were answered. Medications, including possible adverse effects, and instructions were reviewed and  understanding was confirmed. Follow-up recommendations, including when to contact or return to office (ie; if symptoms worsen or fail to improve), were discussed and acknowledged.     Electronically signed by Kaylee De Leon MD on 8/25/22 at 1:32 PM CDT

## 2022-12-05 ENCOUNTER — OFFICE VISIT (OUTPATIENT)
Dept: PRIMARY CARE CLINIC | Age: 82
End: 2022-12-05
Payer: MEDICARE

## 2022-12-05 VITALS
BODY MASS INDEX: 24.11 KG/M2 | OXYGEN SATURATION: 95 % | HEART RATE: 75 BPM | SYSTOLIC BLOOD PRESSURE: 110 MMHG | TEMPERATURE: 97.4 F | HEIGHT: 62 IN | DIASTOLIC BLOOD PRESSURE: 60 MMHG | WEIGHT: 131 LBS | RESPIRATION RATE: 20 BRPM

## 2022-12-05 DIAGNOSIS — J30.1 NON-SEASONAL ALLERGIC RHINITIS DUE TO POLLEN: ICD-10-CM

## 2022-12-05 DIAGNOSIS — M48.061 NEURAL FORAMINAL STENOSIS OF LUMBAR SPINE: Primary | ICD-10-CM

## 2022-12-05 DIAGNOSIS — J31.0 CHRONIC RHINITIS: ICD-10-CM

## 2022-12-05 DIAGNOSIS — R73.03 PREDIABETES: ICD-10-CM

## 2022-12-05 DIAGNOSIS — K21.9 HIATAL HERNIA WITH GASTROESOPHAGEAL REFLUX: ICD-10-CM

## 2022-12-05 DIAGNOSIS — Z13.29 SCREENING FOR THYROID DISORDER: ICD-10-CM

## 2022-12-05 DIAGNOSIS — K44.9 HIATAL HERNIA WITH GASTROESOPHAGEAL REFLUX: ICD-10-CM

## 2022-12-05 PROCEDURE — 1090F PRES/ABSN URINE INCON ASSESS: CPT | Performed by: INTERNAL MEDICINE

## 2022-12-05 PROCEDURE — G8400 PT W/DXA NO RESULTS DOC: HCPCS | Performed by: INTERNAL MEDICINE

## 2022-12-05 PROCEDURE — 1123F ACP DISCUSS/DSCN MKR DOCD: CPT | Performed by: INTERNAL MEDICINE

## 2022-12-05 PROCEDURE — G8420 CALC BMI NORM PARAMETERS: HCPCS | Performed by: INTERNAL MEDICINE

## 2022-12-05 PROCEDURE — 1036F TOBACCO NON-USER: CPT | Performed by: INTERNAL MEDICINE

## 2022-12-05 PROCEDURE — G8427 DOCREV CUR MEDS BY ELIG CLIN: HCPCS | Performed by: INTERNAL MEDICINE

## 2022-12-05 PROCEDURE — G8484 FLU IMMUNIZE NO ADMIN: HCPCS | Performed by: INTERNAL MEDICINE

## 2022-12-05 PROCEDURE — 99214 OFFICE O/P EST MOD 30 MIN: CPT | Performed by: INTERNAL MEDICINE

## 2022-12-05 RX ORDER — MONTELUKAST SODIUM 10 MG/1
10 TABLET ORAL NIGHTLY
Qty: 90 TABLET | Refills: 1 | Status: SHIPPED | OUTPATIENT
Start: 2022-12-05

## 2022-12-05 ASSESSMENT — ENCOUNTER SYMPTOMS
NAUSEA: 0
DIARRHEA: 0
BACK PAIN: 1
SINUS PAIN: 0
SHORTNESS OF BREATH: 0
CONSTIPATION: 0
ABDOMINAL DISTENTION: 0
ABDOMINAL PAIN: 0
APNEA: 0
CHEST TIGHTNESS: 0
COUGH: 0
WHEEZING: 0
TROUBLE SWALLOWING: 0
BLOOD IN STOOL: 0
VOMITING: 0
SORE THROAT: 0

## 2022-12-05 NOTE — ASSESSMENT & PLAN NOTE
Uncontrolled, changes made today: Patient then stopped Xyzal and switched to Allegra 180 continue Flonase or nasal steroids of her choice and will try and add montelukast for nasal congestion

## 2022-12-05 NOTE — PROGRESS NOTES
Monica Ospina ( 1940) is a [de-identified] y.o. female,  Established  here for evaluation of the following chief complaint(s). Follow-up Chronic Condition (6 months; hyperlipidemia)      Patient was encouraged and advised to be compliant with all  medications leads an active lifestyle and promote maintaining a healthy weight, encouraged not to use cigarettes, laboratory results discussed and reviewed with patient's during this visit   ASSESSMENT/PLAN:  Problem List          Respiratory    Hiatal hernia with gastroesophageal reflux      Well-controlled, continue current medications         Relevant Medications    Simethicone 125 MG CAPS    Chronic rhinitis    Relevant Medications    montelukast (SINGULAIR) 10 MG tablet    Allergic rhinitis      Uncontrolled, changes made today: Patient then stopped Xyzal and switched to Allegra 180 continue Flonase or nasal steroids of her choice and will try and add montelukast for nasal congestion         Relevant Medications    Levocetirizine Dihydrochloride 2.5 MG/5ML SOLN    montelukast (SINGULAIR) 10 MG tablet       Musculoskeletal and Integument    Neural foraminal stenosis of lumbar spine - Primary      Well-controlled, continue current medications         Relevant Orders    Drug SCRN, Buprenorphine         No flowsheet data found. PHQ Scores 2022   PHQ2 Score 0   PHQ9 Score 0       Results for orders placed or performed in visit on 22   COVID-19   Result Value Ref Range    SARS-CoV-2, PCR Not Detected Not Detected                 Return in about 6 months (around 2023). HPI  [de-identified]year old female  Relocated from [facility], now here  She is independent, performs ADL's and IADL's,   Here to establish care  She has not seen an MD for sometime  She like to take homeopathic remedies  Had probiotic yesterday, and was awakened by spasm,    Review of Systems   Constitutional:  Negative for activity change, fatigue and fever.    HENT:  Positive for congestion. Negative for ear pain, hearing loss, sinus pain, sore throat and trouble swallowing. Eyes:  Negative for visual disturbance. Respiratory:  Negative for apnea, cough, chest tightness, shortness of breath and wheezing. Cardiovascular:  Negative for chest pain, palpitations and leg swelling. Gastrointestinal:  Negative for abdominal distention, abdominal pain, blood in stool, constipation, diarrhea, nausea and vomiting. Heartburn   Endocrine: Negative for cold intolerance, heat intolerance and polyuria. Genitourinary:  Negative for difficulty urinating, dysuria, flank pain, frequency and urgency. Musculoskeletal:  Positive for back pain. Negative for arthralgias and myalgias. Skin:  Negative for rash and wound. Allergic/Immunologic: Negative for environmental allergies and food allergies. Neurological:  Negative for dizziness, tremors, seizures, syncope, speech difficulty, weakness, light-headedness, numbness and headaches. Hematological:  Negative for adenopathy. Does not bruise/bleed easily. Psychiatric/Behavioral:  Negative for confusion, decreased concentration, dysphoric mood, sleep disturbance and suicidal ideas. The patient is not nervous/anxious and is not hyperactive. Physical Exam  Vitals and nursing note reviewed. Constitutional:       General: She is not in acute distress. Appearance: Normal appearance. HENT:      Head: Normocephalic. Right Ear: Ear canal and external ear normal.      Left Ear: Ear canal and external ear normal.      Nose: Nose normal. No congestion or rhinorrhea. Mouth/Throat:      Mouth: Mucous membranes are moist.      Pharynx: No oropharyngeal exudate or posterior oropharyngeal erythema. Eyes:      General: No scleral icterus. Right eye: No discharge. Left eye: No discharge. Extraocular Movements: Extraocular movements intact.       Conjunctiva/sclera: Conjunctivae normal.      Pupils: Pupils are equal, round, and reactive to light. Neck:      Thyroid: No thyromegaly. Vascular: No carotid bruit or JVD. Cardiovascular:      Rate and Rhythm: Normal rate and regular rhythm. Chest Wall: PMI is not displaced. Pulses: Normal pulses. Heart sounds: Normal heart sounds, S1 normal and S2 normal. No murmur heard. Pulmonary:      Effort: Pulmonary effort is normal. No respiratory distress. Breath sounds: Normal breath sounds and air entry. No decreased air movement or transmitted upper airway sounds. No decreased breath sounds, wheezing or rales. Abdominal:      General: Abdomen is flat. Bowel sounds are normal. There is no distension. Palpations: Abdomen is soft. There is no shifting dullness, hepatomegaly, splenomegaly or mass. Tenderness: There is no abdominal tenderness. There is no right CVA tenderness, left CVA tenderness, guarding or rebound. Hernia: No hernia is present. Musculoskeletal:         General: Tenderness present. No deformity. Normal range of motion. Right shoulder: Normal.      Left shoulder: Normal.      Right elbow: Normal.      Left elbow: Normal.      Right wrist: Normal.      Left wrist: Normal.      Cervical back: Normal, normal range of motion and neck supple. No rigidity. Thoracic back: Normal.      Lumbar back: Normal.      Right knee: Normal.      Left knee: Normal.      Right lower leg: No edema. Left lower leg: No edema. Right ankle: Normal.      Left ankle: Normal.   Lymphadenopathy:      Cervical: No cervical adenopathy. Skin:     General: Skin is warm and moist.      Findings: No lesion or rash. Neurological:      Mental Status: She is alert. Mental status is at baseline. Cranial Nerves: No cranial nerve deficit. Motor: Motor function is intact. No weakness. Coordination: Coordination is intact. Gait: Gait is intact.  Gait normal.      Deep Tendon Reflexes: Reflexes normal.   Psychiatric: Attention and Perception: Attention normal.         Speech: Speech normal.         Behavior: Behavior normal.         Thought Content:  Thought content normal.         Judgment: Judgment normal.         (Time Documentation Optional [id number])    An electronic signaturewaas used to authenticate this note  -Isaiah Kee MD on 12/5/2022 at 4:29 PM

## 2022-12-09 ENCOUNTER — NURSE ONLY (OUTPATIENT)
Dept: PRIMARY CARE CLINIC | Age: 82
End: 2022-12-09
Payer: MEDICARE

## 2022-12-09 ENCOUNTER — OFFICE VISIT (OUTPATIENT)
Age: 82
End: 2022-12-09

## 2022-12-09 VITALS
WEIGHT: 129.6 LBS | HEART RATE: 71 BPM | TEMPERATURE: 98.3 F | SYSTOLIC BLOOD PRESSURE: 132 MMHG | OXYGEN SATURATION: 96 % | RESPIRATION RATE: 18 BRPM | BODY MASS INDEX: 23.85 KG/M2 | HEIGHT: 62 IN | DIASTOLIC BLOOD PRESSURE: 74 MMHG

## 2022-12-09 DIAGNOSIS — Z23 FLU VACCINE NEED: Primary | ICD-10-CM

## 2022-12-09 DIAGNOSIS — J02.9 PHARYNGITIS, UNSPECIFIED ETIOLOGY: Primary | ICD-10-CM

## 2022-12-09 PROBLEM — E11.9 TYPE 2 DIABETES MELLITUS (HCC): Status: ACTIVE | Noted: 2022-12-09

## 2022-12-09 LAB
INFLUENZA A ANTIBODY: NORMAL
INFLUENZA B ANTIBODY: NORMAL
S PYO AG THROAT QL: NORMAL

## 2022-12-09 PROCEDURE — 90694 VACC AIIV4 NO PRSRV 0.5ML IM: CPT | Performed by: INTERNAL MEDICINE

## 2022-12-09 PROCEDURE — 99999 PR OFFICE/OUTPT VISIT,PROCEDURE ONLY: CPT | Performed by: INTERNAL MEDICINE

## 2022-12-09 RX ORDER — AZITHROMYCIN 250 MG/1
250 TABLET, FILM COATED ORAL SEE ADMIN INSTRUCTIONS
Qty: 6 TABLET | Refills: 0 | Status: SHIPPED | OUTPATIENT
Start: 2022-12-09 | End: 2022-12-14

## 2022-12-09 ASSESSMENT — ENCOUNTER SYMPTOMS
RHINORRHEA: 1
SORE THROAT: 1
VOICE CHANGE: 1

## 2022-12-09 NOTE — PROGRESS NOTES
[address]  [address] [address] [place] [address]  Dept: [phone]  Dept Fax: [phone]  Loc: [phone]    Marilu Myles is a [de-identified] y.o. female who presents today for her medical conditions/complaints as noted below. Marilu Myles is c/o of Otalgia (Left ear)        HPI:     HPI  Marilu Myles presents with complaints of left ear pain. Symptoms began 90 minutes ago. Has had runny nose and hoarseness also - that worsened this morning. OTC treatment includes allergy medications and flonase. Had seasonal flu shot today. Denies recent antibiotics and steroids. Denies recent covid19 infection. Vaccinated against [id number]. History reviewed. No pertinent past medical history. No past surgical history on file.     Family History   Problem Relation Age of Onset    Cancer Maternal Grandmother     Cancer Maternal Grandfather     Cancer Paternal Grandmother        Social History     Tobacco Use    Smoking status: Never    Smokeless tobacco: Never   Substance Use Topics    Alcohol use: Not on file      Current Outpatient Medications   Medication Sig Dispense Refill    azithromycin (ZITHROMAX) 250 MG tablet Take 1 tablet by mouth See Admin Instructions for 5 days 500mg on day 1 followed by 250mg on days 2 - 5 6 tablet 0    metFORMIN (GLUCOPHAGE) 500 MG tablet Take 1 tablet by mouth daily (with breakfast) 90 tablet 1    montelukast (SINGULAIR) 10 MG tablet Take 1 tablet by mouth nightly 90 tablet 1    Alpha-Lipoic Acid 100 MG TABS Take 200 mg by mouth every morning      calcium citrate (CALCITRATE) 950 (200 Ca) MG tablet Take 1,900 mg by mouth      Apoaequorin 10 MG CAPS Take 70 mg by mouth daily      Levocetirizine Dihydrochloride 2.5 MG/5ML SOLN Take by mouth daily      fish oil-omega-3 fatty acids 1000 MG capsule Take by mouth daily      Peppermint Oil 90 MG CPCR Take by mouth 2 times daily      Simethicone 125 MG CAPS Take 3 tablets by mouth ezetimibe (ZETIA) 10 MG tablet       memantine (NAMENDA) 10 MG tablet       propranolol (INDERAL) 20 MG tablet       traMADol (ULTRAM) 50 MG tablet        No current facility-administered medications for this visit. Allergies   Allergen Reactions    Sulfa Antibiotics        Health Maintenance   Topic Date Due    Annual Wellness Visit (AWV)  Never done    Pneumococcal 65+ years Vaccine (2 - PPSV23 if available, else PCV20) 07/04/2023 (Originally 6/9/2019)    DTaP/Tdap/Td vaccine (1 - Tdap) 12/05/2023 (Originally 10/17/1959)    Shingles vaccine (2 of 2) 12/05/2023 (Originally 10/20/2022)    COVID-19 Vaccine (3 - Booster for Helayne Drop series) 12/29/2023 (Originally 4/15/2021)    Depression Screen  07/25/2023    DEXA (modify frequency per FRAX score)  Completed    Flu vaccine  Completed    Hepatitis A vaccine  Aged Out    Hib vaccine  Aged Out    Meningococcal (ACWY) vaccine  Aged Out       Subjective:     Review of Systems   Constitutional:  Negative for fever. HENT:  Positive for ear pain, postnasal drip, rhinorrhea, sore throat and voice change.      :Objective      Physical Exam  Constitutional:       General: She is not in acute distress. Appearance: Normal appearance. She is not ill-appearing or toxic-appearing. HENT:      Head: Normocephalic and atraumatic. Right Ear: Tympanic membrane, ear canal and external ear normal.      Left Ear: Tympanic membrane, ear canal and external ear normal.      Nose: Rhinorrhea present. Mouth/Throat:      Mouth: Mucous membranes are moist.      Pharynx: Oropharynx is clear. Posterior oropharyngeal erythema present. No oropharyngeal exudate. Tonsils: No tonsillar exudate. Eyes:      General:         Right eye: No discharge. Left eye: No discharge. Conjunctiva/sclera: Conjunctivae normal.   Cardiovascular:      Rate and Rhythm: Normal rate and regular rhythm. Pulmonary:      Effort: Pulmonary effort is normal. No respiratory distress. Abdominal:      General: Abdomen is flat. Palpations: Abdomen is soft. Musculoskeletal:         General: Normal range of motion. Cervical back: Normal range of motion. Lymphadenopathy:      Head:      Left side of head: Tonsillar adenopathy present. Cervical: No cervical adenopathy. Skin:     General: Skin is warm and dry. Capillary Refill: Capillary refill takes less than 2 seconds. Findings: No rash. Neurological:      General: No focal deficit present. Mental Status: She is alert. Psychiatric:         Mood and Affect: Mood normal.     /74 (Site: Left Upper Arm)   Pulse 71   Temp 98.3 °F (36.8 °C) (Temporal)   Resp 18   Ht 5' 2\" (1.575 m)   Wt 129 lb 9.6 oz (58.8 kg)   SpO2 96%   BMI 23.70 kg/m²     :Assessment       Diagnosis Orders   1. Pharyngitis, unspecified etiology  POCT rapid strep A    POCT Influenza A/B    azithromycin (ZITHROMAX) 250 MG tablet          :Plan   Believe ear pain is referred from throat/lymph nodes. Ear mildly red. Flu and strep negative. Z pack for erythema to throat. Discussed supportive care. Return precautions and home care education completed. Patient verbalized understanding. Orders Placed This Encounter   Procedures    POCT rapid strep A    POCT Influenza A/B     Results for orders placed or performed in visit on 12/09/22   POCT rapid strep A   Result Value Ref Range    Strep A Ag None Detected None Detected   POCT Influenza A/B   Result Value Ref Range    Influenza A Ab neg     Influenza B Ab neg        No follow-ups on file. Orders Placed This Encounter   Medications    azithromycin (ZITHROMAX) 250 MG tablet     Sig: Take 1 tablet by mouth See Admin Instructions for 5 days 500mg on day 1 followed by 250mg on days 2 - 5     Dispense:  6 tablet     Refill:  0       Patient given educational materials- see patient instructions. Discussed use, benefit, and side effects of prescribed medications.   All patient questions answered. Pt voiced understanding. Patient Instructions   1. Quarantine at home 5-7 days from symptom onset  2. Hydrate with water or gatorade  3. OTC cough/cold medications are okay -follow label instructions (tylenol cold and flu severe or equivalent off brand, if high blood pressure use coricidin HBP)  4. Tylenol or motrin for pain or fever  5. Warm salt water gargles or warm liquids for comfort. Warm tea with a tablespoon of honey is excellent for sore throat. 6. Cool mist humidifer   7. If symptoms worsen, please seek reevaluation  8.  Radu Linares as directed      Electronically signed by SALTY Thornton CNP on 12/9/2022 at 6:13 PM

## 2022-12-09 NOTE — PROGRESS NOTES
After obtaining consent, and per orders of Dr. Estefany Hobbs, injection of Flu given in Right deltoid by Gerber Melendez LPN. Patient instructed to remain in clinic for 20 minutes afterwards, and to report any adverse reaction to me immediately.

## 2022-12-10 NOTE — PATIENT INSTRUCTIONS
1. Quarantine at home 5-7 days from symptom onset  2. Hydrate with water or gatorade  3. OTC cough/cold medications are okay -follow label instructions (tylenol cold and flu severe or equivalent off brand, if high blood pressure use coricidin HBP)  4. Tylenol or motrin for pain or fever  5. Warm salt water gargles or warm liquids for comfort. Warm tea with a tablespoon of honey is excellent for sore throat. 6. Cool mist humidifer   7. If symptoms worsen, please seek reevaluation  8.  Gemma Boulder as directed

## 2022-12-16 ENCOUNTER — OFFICE VISIT (OUTPATIENT)
Age: 82
End: 2022-12-16
Payer: MEDICARE

## 2022-12-16 VITALS
RESPIRATION RATE: 16 BRPM | BODY MASS INDEX: 23.59 KG/M2 | HEART RATE: 74 BPM | OXYGEN SATURATION: 96 % | WEIGHT: 128.2 LBS | DIASTOLIC BLOOD PRESSURE: 72 MMHG | TEMPERATURE: 98.9 F | SYSTOLIC BLOOD PRESSURE: 128 MMHG | HEIGHT: 62 IN

## 2022-12-16 DIAGNOSIS — H66.005 RECURRENT ACUTE SUPPURATIVE OTITIS MEDIA WITHOUT SPONTANEOUS RUPTURE OF LEFT TYMPANIC MEMBRANE: Primary | ICD-10-CM

## 2022-12-16 PROCEDURE — G8400 PT W/DXA NO RESULTS DOC: HCPCS

## 2022-12-16 PROCEDURE — 1036F TOBACCO NON-USER: CPT

## 2022-12-16 PROCEDURE — 1090F PRES/ABSN URINE INCON ASSESS: CPT

## 2022-12-16 PROCEDURE — G8420 CALC BMI NORM PARAMETERS: HCPCS

## 2022-12-16 PROCEDURE — 1123F ACP DISCUSS/DSCN MKR DOCD: CPT

## 2022-12-16 PROCEDURE — G8484 FLU IMMUNIZE NO ADMIN: HCPCS

## 2022-12-16 PROCEDURE — 99213 OFFICE O/P EST LOW 20 MIN: CPT

## 2022-12-16 PROCEDURE — G8427 DOCREV CUR MEDS BY ELIG CLIN: HCPCS

## 2022-12-16 RX ORDER — AMOXICILLIN AND CLAVULANATE POTASSIUM 875; 125 MG/1; MG/1
1 TABLET, FILM COATED ORAL 2 TIMES DAILY
Qty: 20 TABLET | Refills: 0 | Status: SHIPPED | OUTPATIENT
Start: 2022-12-16 | End: 2022-12-26

## 2022-12-16 ASSESSMENT — ENCOUNTER SYMPTOMS
DIARRHEA: 0
COUGH: 0
SORE THROAT: 0
ABDOMINAL PAIN: 0
SHORTNESS OF BREATH: 0
SINUS PAIN: 0
SINUS PRESSURE: 0
NAUSEA: 0
EYE PAIN: 0
VOMITING: 0
ALLERGIC/IMMUNOLOGIC NEGATIVE: 1

## 2022-12-16 NOTE — PATIENT INSTRUCTIONS
Antibiotic sent to the pharmacy. Tylenol/Motrin as needed for fever. Encourage rest and increase fluid intake. Try a warm, moist washcloth on the ear. It may help relieve pain. Follow up with PCP or return to the clinic if symptoms worsen or fail to improve.

## 2022-12-16 NOTE — PROGRESS NOTES
[address]  [address] [address] [place] [address]  Dept: [phone]  Dept Fax: [phone]  Loc: [phone]    Neno Katz is a [de-identified] y.o. female who presents today for her medical conditions/complaints as noted below. Neno Katz is c/o of Otalgia (Left earache had a z-carolann from last visit and completed it and now her ear is hurting again)        HPI:     Neno Katz presents with complaints of left ear pain. Patient was seen here in the Urgent Care on 12/9, started a Zpak for pharygnitis. Denies any fever. Patient states ear symptoms improved by have now worsened the past 2 days. Symptoms began 2 days ago. Denies any OTC treatment. Denies any recent steroids. Denies recent covid19 infection. History reviewed. No pertinent past medical history. History reviewed. No pertinent surgical history.     Family History   Problem Relation Age of Onset    Cancer Maternal Grandmother     Cancer Maternal Grandfather     Cancer Paternal Grandmother        Social History     Tobacco Use    Smoking status: Never    Smokeless tobacco: Never   Substance Use Topics    Alcohol use: Not on file      Current Outpatient Medications   Medication Sig Dispense Refill    amoxicillin-clavulanate (AUGMENTIN) 875-125 MG per tablet Take 1 tablet by mouth 2 times daily for 10 days 20 tablet 0    metFORMIN (GLUCOPHAGE) 500 MG tablet Take 1 tablet by mouth daily (with breakfast) 90 tablet 1    montelukast (SINGULAIR) 10 MG tablet Take 1 tablet by mouth nightly 90 tablet 1    Alpha-Lipoic Acid 100 MG TABS Take 200 mg by mouth every morning      calcium citrate (CALCITRATE) 950 (200 Ca) MG tablet Take 1,900 mg by mouth      Apoaequorin 10 MG CAPS Take 70 mg by mouth daily      Levocetirizine Dihydrochloride 2.5 MG/5ML SOLN Take by mouth daily      fish oil-omega-3 fatty acids 1000 MG capsule Take by mouth daily      Peppermint Oil 90 MG CPCR Take by mouth 2 times daily Simethicone 125 MG CAPS Take 3 tablets by mouth      ezetimibe (ZETIA) 10 MG tablet       memantine (NAMENDA) 10 MG tablet       propranolol (INDERAL) 20 MG tablet       traMADol (ULTRAM) 50 MG tablet        No current facility-administered medications for this visit. Allergies   Allergen Reactions    Sulfa Antibiotics        Health Maintenance   Topic Date Due    Annual Wellness Visit (AWV)  Never done    Pneumococcal 65+ years Vaccine (2 - PPSV23 if available, else PCV20) 07/04/2023 (Originally 6/9/2019)    DTaP/Tdap/Td vaccine (1 - Tdap) 12/05/2023 (Originally 10/17/1959)    Shingles vaccine (2 of 2) 12/05/2023 (Originally 10/20/2022)    COVID-19 Vaccine (3 - Booster for Derl Fitch series) 12/29/2023 (Originally 4/15/2021)    Depression Screen  07/25/2023    DEXA (modify frequency per FRAX score)  Completed    Flu vaccine  Completed    Hepatitis A vaccine  Aged Out    Hib vaccine  Aged Out    Meningococcal (ACWY) vaccine  Aged Out       Subjective:     Review of Systems   Constitutional:  Positive for fatigue. Negative for chills and fever. HENT:  Positive for ear pain (left ear). Negative for congestion, postnasal drip, sinus pressure, sinus pain and sore throat. Eyes:  Negative for pain and visual disturbance. Respiratory:  Negative for cough and shortness of breath. Cardiovascular:  Negative for chest pain. Gastrointestinal:  Negative for abdominal pain, diarrhea, nausea and vomiting. Endocrine: Negative for cold intolerance and heat intolerance. Genitourinary:  Negative for frequency, hematuria and urgency. Musculoskeletal:  Negative for myalgias. Skin:  Negative for rash. Allergic/Immunologic: Negative. Neurological:  Negative for weakness, light-headedness and headaches. Hematological: Negative. Psychiatric/Behavioral: Negative.       :Objective      Physical Exam  Constitutional:       Appearance: Normal appearance. HENT:      Head: Normocephalic and atraumatic.       Right Ear: Tympanic membrane, ear canal and external ear normal.      Left Ear: Ear canal and external ear normal. A middle ear effusion is present. Tympanic membrane is erythematous and bulging. Nose: No congestion or rhinorrhea. Right Sinus: No maxillary sinus tenderness or frontal sinus tenderness. Left Sinus: No maxillary sinus tenderness or frontal sinus tenderness. Mouth/Throat:      Mouth: Mucous membranes are moist.   Eyes:      General:         Right eye: No discharge. Left eye: No discharge. Conjunctiva/sclera: Conjunctivae normal.   Cardiovascular:      Rate and Rhythm: Normal rate and regular rhythm. Pulmonary:      Effort: Pulmonary effort is normal. No respiratory distress. Abdominal:      General: Abdomen is flat. Palpations: Abdomen is soft. Musculoskeletal:         General: Normal range of motion. Cervical back: Normal range of motion. Lymphadenopathy:      Cervical: No cervical adenopathy. Skin:     General: Skin is warm and dry. Capillary Refill: Capillary refill takes less than 2 seconds. Neurological:      General: No focal deficit present. Mental Status: She is alert. Psychiatric:         Mood and Affect: Mood normal.     /72   Pulse 74   Temp 98.9 °F (37.2 °C)   Resp 16   Ht 5' 2\" (1.575 m)   Wt 128 lb 3.2 oz (58.2 kg)   SpO2 96%   BMI 23.45 kg/m²     :Assessment       Diagnosis Orders   1. Recurrent acute suppurative otitis media without spontaneous rupture of left tympanic membrane  amoxicillin-clavulanate (AUGMENTIN) 875-125 MG per tablet          :Plan   Antibiotic sent to the pharmacy. Tylenol/Motrin as needed for fever. Encourage rest and increase fluid intake. Try a warm, moist washcloth on the ear. It may help relieve pain. Return precautions and home care education completed. Patient verbalized understanding. No orders of the defined types were placed in this encounter.       No results found for this visit on 12/16/22. Return if symptoms worsen or fail to improve. Orders Placed This Encounter   Medications    amoxicillin-clavulanate (AUGMENTIN) 875-125 MG per tablet     Sig: Take 1 tablet by mouth 2 times daily for 10 days     Dispense:  20 tablet     Refill:  0       Patient given educational materials- see patient instructions. Discussed use, benefit, and side effects of prescribed medications. All patient questions answered. Pt voiced understanding. Patient Instructions   Antibiotic sent to the pharmacy. Tylenol/Motrin as needed for fever. Encourage rest and increase fluid intake. Try a warm, moist washcloth on the ear. It may help relieve pain. Follow up with PCP or return to the clinic if symptoms worsen or fail to improve.       Electronically signed by SALTY Barriga CNP on 12/16/2022 at 4:07 PM

## 2022-12-29 ENCOUNTER — OFFICE VISIT (OUTPATIENT)
Age: 82
End: 2022-12-29
Payer: MEDICARE

## 2022-12-29 VITALS
SYSTOLIC BLOOD PRESSURE: 122 MMHG | BODY MASS INDEX: 24.11 KG/M2 | WEIGHT: 131 LBS | OXYGEN SATURATION: 96 % | HEART RATE: 75 BPM | HEIGHT: 62 IN | TEMPERATURE: 98.8 F | DIASTOLIC BLOOD PRESSURE: 70 MMHG

## 2022-12-29 DIAGNOSIS — J31.0 CHRONIC RHINITIS: Primary | ICD-10-CM

## 2022-12-29 DIAGNOSIS — H65.02 ACUTE SEROUS OTITIS MEDIA OF LEFT EAR, RECURRENCE NOT SPECIFIED: ICD-10-CM

## 2022-12-29 DIAGNOSIS — R09.82 POST-NASAL DRIP: ICD-10-CM

## 2022-12-29 PROCEDURE — G8420 CALC BMI NORM PARAMETERS: HCPCS | Performed by: NURSE PRACTITIONER

## 2022-12-29 PROCEDURE — 1036F TOBACCO NON-USER: CPT | Performed by: NURSE PRACTITIONER

## 2022-12-29 PROCEDURE — 1090F PRES/ABSN URINE INCON ASSESS: CPT | Performed by: NURSE PRACTITIONER

## 2022-12-29 PROCEDURE — G8484 FLU IMMUNIZE NO ADMIN: HCPCS | Performed by: NURSE PRACTITIONER

## 2022-12-29 PROCEDURE — G8427 DOCREV CUR MEDS BY ELIG CLIN: HCPCS | Performed by: NURSE PRACTITIONER

## 2022-12-29 PROCEDURE — 1123F ACP DISCUSS/DSCN MKR DOCD: CPT | Performed by: NURSE PRACTITIONER

## 2022-12-29 PROCEDURE — G8400 PT W/DXA NO RESULTS DOC: HCPCS | Performed by: NURSE PRACTITIONER

## 2022-12-29 PROCEDURE — 99213 OFFICE O/P EST LOW 20 MIN: CPT | Performed by: NURSE PRACTITIONER

## 2022-12-29 RX ORDER — METHYLPREDNISOLONE 4 MG/1
TABLET ORAL
Qty: 1 KIT | Refills: 0 | Status: SHIPPED | OUTPATIENT
Start: 2022-12-29

## 2022-12-29 RX ORDER — CEFDINIR 300 MG/1
300 CAPSULE ORAL 2 TIMES DAILY
Qty: 20 CAPSULE | Refills: 0 | Status: SHIPPED | OUTPATIENT
Start: 2022-12-29 | End: 2022-12-30

## 2022-12-29 ASSESSMENT — ENCOUNTER SYMPTOMS
VOMITING: 0
DIARRHEA: 0
NAUSEA: 0
SORE THROAT: 1
EYES NEGATIVE: 1
SINUS PRESSURE: 0
ABDOMINAL PAIN: 0
COUGH: 0

## 2022-12-29 ASSESSMENT — VISUAL ACUITY: OU: 1

## 2022-12-29 NOTE — PATIENT INSTRUCTIONS
Plenty of fluids  Make sure you are taking Singulair daily  Recommend Flonase 2 sprays each nostril at bedtime  OTC Zyrtec daily  Omnicef as directed  Medrol dose pack as directed  Recommend Appt with ENT upon return from [place] or follow-up with PCP for chronic allergy/ ear infections

## 2022-12-30 ENCOUNTER — TELEPHONE (OUTPATIENT)
Age: 82
End: 2022-12-30

## 2022-12-30 ENCOUNTER — TELEPHONE (OUTPATIENT)
Dept: INTERNAL MEDICINE | Age: 82
End: 2022-12-30

## 2022-12-30 RX ORDER — DOXYCYCLINE HYCLATE 100 MG
100 TABLET ORAL 2 TIMES DAILY
Qty: 14 TABLET | Refills: 0 | Status: SHIPPED | OUTPATIENT
Start: 2022-12-30 | End: 2023-01-06

## 2022-12-30 NOTE — TELEPHONE ENCOUNTER
Patient called concerned about antibiotic causing diarrhea. I spoke with provider she had stated that patient can just continue with flonase and claritin if concerned about diarrhea.  Patient stated she wanted us to send her in a new antibiotic, I explained the patient that all antibiotic have side effect of diarrhea

## 2022-12-30 NOTE — TELEPHONE ENCOUNTER
Called spoke with patient recommend OTC probiotic, Zyrtec, Flonase and along with her Singulair. Recommend to follow with her PCP and may need to see ENT. Provider has also sent over another antibiotic also explain to patient to use sunscreen while on antibiotic when she goes to [place] vacation.

## 2022-12-30 NOTE — PROGRESS NOTES
Patient called concerned about antibiotic causing diarrhea. I spoke with provider she had stated that patient can just continue with flonase and claritin if concerned about diarrhea. Patient stated she wanted us to send her in a new antibiotic, I explained the patient that all antibiotic have side effect of diarrhea.

## 2022-12-30 NOTE — TELEPHONE ENCOUNTER
Please let her know she has recently taken 2 other antibiotics for her ear and Chip Gabrielle is a reasonable choice but I will send her in something else. Recommend OTC probiotic and Zyrtec and Flonase daily along with her Singulair that she is supposed to be taking daily. Recommend follow-up with her PCP on return from her trip  from [place] and she may need to see an ENT as we discussed yesterday.

## 2022-12-30 NOTE — TELEPHONE ENCOUNTER
Recommend she use  extra sunscreen in [place] with taking this medication( Doxycycline). However, all antibiotics make you more sensitive to the sun.  Thank you

## 2022-12-30 NOTE — TELEPHONE ENCOUNTER
I am unsure what she wants me to do about this, she was seen in UC and she was given this antibiotic, she can decide not to take it, is appropriate for the infection documented in the chart No - - -

## 2022-12-30 NOTE — TELEPHONE ENCOUNTER
Called and spoke with patient informing her that the medication is appropriate for the diagnosed condition and that it is up to her to take it or not. Instructed patient to call if she has issues with the medication and side effects, but we have no way of knowing how a medication will affect her until she takes the medication. Patient verbalized understanding.

## 2022-12-30 NOTE — TELEPHONE ENCOUNTER
The patient called to ask Dr. Papa Glover if the cefdinir is an appropriate antibiotic for her with the history of diarrhea.

## 2023-03-23 ENCOUNTER — TELEPHONE (OUTPATIENT)
Dept: PRIMARY CARE CLINIC | Age: 83
End: 2023-03-23

## 2023-03-23 DIAGNOSIS — F09 COGNITIVE DISORDER: Primary | ICD-10-CM

## 2023-03-23 RX ORDER — MEMANTINE HYDROCHLORIDE 10 MG/1
10 TABLET ORAL 2 TIMES DAILY
Qty: 180 TABLET | Refills: 1 | Status: SHIPPED | OUTPATIENT
Start: 2023-03-23

## 2023-03-23 NOTE — TELEPHONE ENCOUNTER
----- Message from Dev Kapadia sent at 3/23/2023  9:34 AM CDT -----  Subject: Refill Request    QUESTIONS  Name of Medication? memantine (NAMENDA) 10 MG tablet  Patient-reported dosage and instructions? 10mg twice a day  How many days do you have left? 4  Preferred Pharmacy? [id number]  Pharmacy phone number (if available)? [phone]  ---------------------------------------------------------------------------  --------------  CALL BACK INFO  What is the best way for the office to contact you? OK to leave message on   voicemail  Preferred Call Back Phone Number? [phone]  ---------------------------------------------------------------------------  --------------  SCRIPT ANSWERS  Relationship to Patient?  Self

## 2023-03-23 NOTE — TELEPHONE ENCOUNTER
Patient has been receiving this prescription from another provider. Is this ok to refill? Please advise.

## 2023-06-06 ENCOUNTER — OFFICE VISIT (OUTPATIENT)
Age: 83
End: 2023-06-06
Payer: MEDICARE

## 2023-06-06 VITALS
SYSTOLIC BLOOD PRESSURE: 104 MMHG | BODY MASS INDEX: 23.05 KG/M2 | HEART RATE: 64 BPM | WEIGHT: 126 LBS | OXYGEN SATURATION: 96 % | TEMPERATURE: 97.9 F | DIASTOLIC BLOOD PRESSURE: 56 MMHG

## 2023-06-06 DIAGNOSIS — R73.03 PREDIABETES: ICD-10-CM

## 2023-06-06 DIAGNOSIS — J31.0 CHRONIC RHINITIS: ICD-10-CM

## 2023-06-06 DIAGNOSIS — M79.672 LEFT FOOT PAIN: Primary | ICD-10-CM

## 2023-06-06 PROCEDURE — 1090F PRES/ABSN URINE INCON ASSESS: CPT | Performed by: NURSE PRACTITIONER

## 2023-06-06 PROCEDURE — 99213 OFFICE O/P EST LOW 20 MIN: CPT | Performed by: NURSE PRACTITIONER

## 2023-06-06 PROCEDURE — G8400 PT W/DXA NO RESULTS DOC: HCPCS | Performed by: NURSE PRACTITIONER

## 2023-06-06 PROCEDURE — 1036F TOBACCO NON-USER: CPT | Performed by: NURSE PRACTITIONER

## 2023-06-06 PROCEDURE — G8427 DOCREV CUR MEDS BY ELIG CLIN: HCPCS | Performed by: NURSE PRACTITIONER

## 2023-06-06 PROCEDURE — 1123F ACP DISCUSS/DSCN MKR DOCD: CPT | Performed by: NURSE PRACTITIONER

## 2023-06-06 PROCEDURE — G8420 CALC BMI NORM PARAMETERS: HCPCS | Performed by: NURSE PRACTITIONER

## 2023-06-06 RX ORDER — LOPERAMIDE HYDROCHLORIDE 2 MG/1
1 TABLET ORAL
COMMUNITY

## 2023-06-06 RX ORDER — LEVOCETIRIZINE DIHYDROCHLORIDE 5 MG/1
1 TABLET, FILM COATED ORAL
COMMUNITY

## 2023-06-06 RX ORDER — ESTRADIOL 0.1 MG/G
2 CREAM VAGINAL
COMMUNITY
Start: 2023-03-27

## 2023-06-06 RX ORDER — ESTRADIOL 0.1 MG/G
CREAM VAGINAL
COMMUNITY
Start: 2023-03-05 | End: 2023-06-08

## 2023-06-06 RX ORDER — TRETINOIN 0.5 MG/G
CREAM TOPICAL
COMMUNITY
Start: 2023-03-09

## 2023-06-06 RX ORDER — FLUTICASONE PROPIONATE 50 MCG
1 SPRAY, SUSPENSION (ML) NASAL 2 TIMES DAILY
COMMUNITY
Start: 2023-01-11

## 2023-06-06 RX ORDER — AZELASTINE 1 MG/ML
2 SPRAY, METERED NASAL 2 TIMES DAILY
COMMUNITY
Start: 2023-01-11

## 2023-06-06 RX ORDER — MONTELUKAST SODIUM 10 MG/1
10 TABLET ORAL NIGHTLY
Qty: 90 TABLET | Refills: 1 | Status: SHIPPED | OUTPATIENT
Start: 2023-06-06 | End: 2023-06-08

## 2023-06-06 RX ORDER — UBIDECARENONE 100 MG
100 CAPSULE ORAL DAILY
COMMUNITY

## 2023-06-06 RX ORDER — ESOMEPRAZOLE MAGNESIUM 20 MG/1
20 FOR SUSPENSION ORAL
COMMUNITY

## 2023-06-06 RX ORDER — DIPHENHYDRAMINE HCL 25 MG
50 CAPSULE ORAL
COMMUNITY

## 2023-06-06 ASSESSMENT — ENCOUNTER SYMPTOMS
EYE PAIN: 0
COUGH: 0
STRIDOR: 0
EYE DISCHARGE: 0
ABDOMINAL DISTENTION: 0
COLOR CHANGE: 0
SHORTNESS OF BREATH: 0
SINUS PRESSURE: 0
CHEST TIGHTNESS: 0
TROUBLE SWALLOWING: 0
WHEEZING: 0
ABDOMINAL PAIN: 0
SORE THROAT: 0

## 2023-06-06 NOTE — PATIENT INSTRUCTIONS
Xray pending  Rest, Ice, Compression, and Elevation  Follow-up with PCP as needed  Will refer to ortho if xray abnormal      Verbalized understanding and agrees to plan.

## 2023-06-06 NOTE — PROGRESS NOTES
Comments: Tender to lateral left foot (see diagram). No discoloration or swelling. Skin:     General: Skin is warm and dry. Findings: No rash. Neurological:      General: No focal deficit present. Mental Status: She is alert and oriented to person, place, and time. Sensory: No sensory deficit. BP (!) 104/56   Pulse 64   Temp 97.9 °F (36.6 °C)   Wt 126 lb (57.2 kg)   SpO2 96%   BMI 23.05 kg/m²     Assessment         Diagnosis Orders   1. Left foot pain  XR FOOT LEFT (MIN 3 VIEWS)          Plan   Xray pending  Rest, Ice, Compression, and Elevation  Follow-up with PCP as needed  Will refer to ortho if xray abnormal      Verbalized understanding and agrees to plan. Orders Placed This Encounter   Procedures    XR FOOT LEFT (MIN 3 VIEWS)     Standing Status:   Future     Standing Expiration Date:   6/6/2024       No results found for this visit on 06/06/23. No orders of the defined types were placed in this encounter. New Prescriptions    No medications on file        No follow-ups on file. Discussed use, benefits, and side effects of any prescribed medications. All patient questions were answered. Patient voiced understanding of care plan. Patient was given educational materials - see patient instructions below. Patient Instructions   Xray pending  Rest, Ice, Compression, and Elevation  Follow-up with PCP as needed  Will refer to ortho if xray abnormal      Verbalized understanding and agrees to plan.        Electronically signed by SALTY Price CNP on 6/6/2023 at 5:53 PM

## 2023-06-06 NOTE — TELEPHONE ENCOUNTER
Dulce Villanueva called to request a refill on her medication.       Last office visit : 12/5/2022   Next office visit : 6/6/2023     Requested Prescriptions     Pending Prescriptions Disp Refills    montelukast (SINGULAIR) 10 MG tablet [Pharmacy Med Name: MONTELUKAST SOD 10 MG TABLET] 90 tablet 1     Sig: TAKE 1 TABLET BY MOUTH NIGHTLY    metFORMIN (GLUCOPHAGE) 500 MG tablet [Pharmacy Med Name: METFORMIN  MG TABLET] 90 tablet 1     Sig: TAKE 1 TABLET BY MOUTH EVERY DAY WITH BREAKFAST            Lila Lr LPN

## 2023-06-08 ENCOUNTER — OFFICE VISIT (OUTPATIENT)
Dept: PRIMARY CARE CLINIC | Age: 83
End: 2023-06-08

## 2023-06-08 VITALS
DIASTOLIC BLOOD PRESSURE: 60 MMHG | WEIGHT: 127 LBS | TEMPERATURE: 99 F | RESPIRATION RATE: 20 BRPM | HEART RATE: 84 BPM | BODY MASS INDEX: 23.37 KG/M2 | HEIGHT: 62 IN | SYSTOLIC BLOOD PRESSURE: 100 MMHG | OXYGEN SATURATION: 94 %

## 2023-06-08 DIAGNOSIS — D56.9 THALASSEMIA, UNSPECIFIED TYPE: ICD-10-CM

## 2023-06-08 DIAGNOSIS — M84.375A STRESS FRACTURE OF METATARSAL BONE OF LEFT FOOT, INITIAL ENCOUNTER: ICD-10-CM

## 2023-06-08 DIAGNOSIS — E78.2 MIXED HYPERLIPIDEMIA: ICD-10-CM

## 2023-06-08 DIAGNOSIS — K44.9 HIATAL HERNIA WITH GASTROESOPHAGEAL REFLUX: ICD-10-CM

## 2023-06-08 DIAGNOSIS — M81.0 AGE-RELATED OSTEOPOROSIS WITHOUT CURRENT PATHOLOGICAL FRACTURE: ICD-10-CM

## 2023-06-08 DIAGNOSIS — Z00.00 INITIAL MEDICARE ANNUAL WELLNESS VISIT: ICD-10-CM

## 2023-06-08 DIAGNOSIS — K21.9 HIATAL HERNIA WITH GASTROESOPHAGEAL REFLUX: ICD-10-CM

## 2023-06-08 DIAGNOSIS — Q25.72 CONGENITAL PULMONARY ARTERIOVENOUS SHUNT: ICD-10-CM

## 2023-06-08 DIAGNOSIS — E72.11 HYPERHOMOCYSTINEMIA (HCC): ICD-10-CM

## 2023-06-08 DIAGNOSIS — J30.1 NON-SEASONAL ALLERGIC RHINITIS DUE TO POLLEN: Primary | ICD-10-CM

## 2023-06-08 DIAGNOSIS — E11.69 TYPE 2 DIABETES MELLITUS WITH OTHER SPECIFIED COMPLICATION, WITHOUT LONG-TERM CURRENT USE OF INSULIN (HCC): ICD-10-CM

## 2023-06-08 PROBLEM — E11.9 TYPE 2 DIABETES MELLITUS (HCC): Status: RESOLVED | Noted: 2022-12-09 | Resolved: 2023-06-08

## 2023-06-08 PROCEDURE — 1123F ACP DISCUSS/DSCN MKR DOCD: CPT | Performed by: INTERNAL MEDICINE

## 2023-06-08 PROCEDURE — G0438 PPPS, INITIAL VISIT: HCPCS | Performed by: INTERNAL MEDICINE

## 2023-06-08 SDOH — ECONOMIC STABILITY: FOOD INSECURITY: WITHIN THE PAST 12 MONTHS, THE FOOD YOU BOUGHT JUST DIDN'T LAST AND YOU DIDN'T HAVE MONEY TO GET MORE.: NEVER TRUE

## 2023-06-08 SDOH — ECONOMIC STABILITY: INCOME INSECURITY: HOW HARD IS IT FOR YOU TO PAY FOR THE VERY BASICS LIKE FOOD, HOUSING, MEDICAL CARE, AND HEATING?: NOT HARD AT ALL

## 2023-06-08 SDOH — ECONOMIC STABILITY: HOUSING INSECURITY
IN THE LAST 12 MONTHS, WAS THERE A TIME WHEN YOU DID NOT HAVE A STEADY PLACE TO SLEEP OR SLEPT IN A SHELTER (INCLUDING NOW)?: NO

## 2023-06-08 SDOH — ECONOMIC STABILITY: FOOD INSECURITY: WITHIN THE PAST 12 MONTHS, YOU WORRIED THAT YOUR FOOD WOULD RUN OUT BEFORE YOU GOT MONEY TO BUY MORE.: NEVER TRUE

## 2023-06-08 ASSESSMENT — ENCOUNTER SYMPTOMS
APNEA: 0
ABDOMINAL PAIN: 0
TROUBLE SWALLOWING: 0
WHEEZING: 0
SINUS PAIN: 0
NAUSEA: 0
SORE THROAT: 0
ABDOMINAL DISTENTION: 0
BLOOD IN STOOL: 0
COUGH: 0
DIARRHEA: 0
VOMITING: 0
CONSTIPATION: 0
SHORTNESS OF BREATH: 0
CHEST TIGHTNESS: 0
BACK PAIN: 1

## 2023-06-08 ASSESSMENT — LIFESTYLE VARIABLES
HOW MANY STANDARD DRINKS CONTAINING ALCOHOL DO YOU HAVE ON A TYPICAL DAY: PATIENT DOES NOT DRINK
HOW OFTEN DO YOU HAVE A DRINK CONTAINING ALCOHOL: NEVER

## 2023-06-08 ASSESSMENT — PATIENT HEALTH QUESTIONNAIRE - PHQ9
SUM OF ALL RESPONSES TO PHQ QUESTIONS 1-9: 1
1. LITTLE INTEREST OR PLEASURE IN DOING THINGS: 1
2. FEELING DOWN, DEPRESSED OR HOPELESS: 0
SUM OF ALL RESPONSES TO PHQ QUESTIONS 1-9: 1
SUM OF ALL RESPONSES TO PHQ QUESTIONS 1-9: 1
SUM OF ALL RESPONSES TO PHQ9 QUESTIONS 1 & 2: 1
SUM OF ALL RESPONSES TO PHQ QUESTIONS 1-9: 1

## 2023-06-08 NOTE — PATIENT INSTRUCTIONS
calcium requirement with diet alone, but a vitamin D supplement is usually necessary to meet this goal.  When exposed to the sun, use a sunscreen that protects against both UVA and UVB radiation with an SPF of 30 or greater. Reapply every 2 to 3 hours or after sweating, drying off with a towel, or swimming. Always wear a seat belt when traveling in a car. Always wear a helmet when riding a bicycle or motorcycle. Sari

## 2023-06-08 NOTE — ASSESSMENT & PLAN NOTE
Uncontrolled, Patient is on calcium and not on any antiresorptive therapy currently is wearing a cast on her left foot as there is a suspicion of possibly a stress fracture on her foot

## 2023-06-08 NOTE — PROGRESS NOTES
Neurological:  Negative for dizziness, tremors, seizures, syncope, speech difficulty, weakness, light-headedness, numbness and headaches. Hematological:  Negative for adenopathy. Does not bruise/bleed easily. Psychiatric/Behavioral:  Negative for confusion, decreased concentration, dysphoric mood, sleep disturbance and suicidal ideas. The patient is not nervous/anxious and is not hyperactive. Physical Exam  Vitals and nursing note reviewed. Constitutional:       General: She is not in acute distress. Appearance: Normal appearance. HENT:      Head: Normocephalic. Right Ear: Ear canal and external ear normal.      Left Ear: Ear canal and external ear normal.      Nose: Nose normal. No congestion or rhinorrhea. Mouth/Throat:      Mouth: Mucous membranes are moist.      Pharynx: No oropharyngeal exudate or posterior oropharyngeal erythema. Eyes:      General: No scleral icterus. Right eye: No discharge. Left eye: No discharge. Extraocular Movements: Extraocular movements intact. Conjunctiva/sclera: Conjunctivae normal.      Pupils: Pupils are equal, round, and reactive to light. Neck:      Thyroid: No thyromegaly. Vascular: No carotid bruit or JVD. Cardiovascular:      Rate and Rhythm: Normal rate and regular rhythm. Chest Wall: PMI is not displaced. Pulses: Normal pulses. Heart sounds: Normal heart sounds, S1 normal and S2 normal. No murmur heard. Pulmonary:      Effort: Pulmonary effort is normal. No respiratory distress. Breath sounds: Normal breath sounds and air entry. No decreased air movement or transmitted upper airway sounds. No decreased breath sounds, wheezing or rales. Abdominal:      General: Abdomen is flat. Bowel sounds are normal. There is no distension. Palpations: Abdomen is soft. There is no shifting dullness, hepatomegaly, splenomegaly or mass. Tenderness: There is no abdominal tenderness.  There is no

## 2023-06-08 NOTE — ASSESSMENT & PLAN NOTE
Well-controlled, continue current medications Yumiko Ramos is a 21 year old right handed female presenting to the clinic for transient tics, anxiety/depression, self stimulative behavior, family history of autism.  Patient drinks on average 0-1 cups of caffeine daily.    Eye Problem(s):glasses or contacts  ENT Problem(s):negative  Cardiovascular problem(s):negative  Respiratory problem(s):negative  Gastro-intestinal problem(s):negative GI  Genito-urinary problem(s):negative  Musculoskeletal problem(s):negative  Integumentary problem(s):dermatitis in the winter  Neurological problem(s): involuntary movements and tics with words and involuntary movements  Psychiatric problem(s):anxiety and depression  Endocrine problem(s):negative  Hematologic and/or Lymphatic problem(s):negative

## 2023-08-20 ENCOUNTER — OFFICE VISIT (OUTPATIENT)
Age: 83
End: 2023-08-20
Payer: MEDICARE

## 2023-08-20 VITALS
BODY MASS INDEX: 23.55 KG/M2 | HEIGHT: 62 IN | OXYGEN SATURATION: 97 % | TEMPERATURE: 98.8 F | DIASTOLIC BLOOD PRESSURE: 68 MMHG | HEART RATE: 94 BPM | WEIGHT: 128 LBS | SYSTOLIC BLOOD PRESSURE: 106 MMHG | RESPIRATION RATE: 19 BRPM

## 2023-08-20 DIAGNOSIS — J02.0 STREP THROAT: ICD-10-CM

## 2023-08-20 DIAGNOSIS — J02.9 SORE THROAT: Primary | ICD-10-CM

## 2023-08-20 PROBLEM — E11.9 TYPE 2 DIABETES MELLITUS (HCC): Status: ACTIVE | Noted: 2023-08-20

## 2023-08-20 LAB — S PYO AG THROAT QL: POSITIVE

## 2023-08-20 PROCEDURE — 1090F PRES/ABSN URINE INCON ASSESS: CPT | Performed by: NURSE PRACTITIONER

## 2023-08-20 PROCEDURE — 99213 OFFICE O/P EST LOW 20 MIN: CPT | Performed by: NURSE PRACTITIONER

## 2023-08-20 PROCEDURE — G8400 PT W/DXA NO RESULTS DOC: HCPCS | Performed by: NURSE PRACTITIONER

## 2023-08-20 PROCEDURE — 1123F ACP DISCUSS/DSCN MKR DOCD: CPT | Performed by: NURSE PRACTITIONER

## 2023-08-20 PROCEDURE — G8427 DOCREV CUR MEDS BY ELIG CLIN: HCPCS | Performed by: NURSE PRACTITIONER

## 2023-08-20 PROCEDURE — 1036F TOBACCO NON-USER: CPT | Performed by: NURSE PRACTITIONER

## 2023-08-20 PROCEDURE — G8420 CALC BMI NORM PARAMETERS: HCPCS | Performed by: NURSE PRACTITIONER

## 2023-08-20 RX ORDER — AMOXICILLIN 500 MG/1
500 CAPSULE ORAL 2 TIMES DAILY
Qty: 20 CAPSULE | Refills: 0 | Status: SHIPPED | OUTPATIENT
Start: 2023-08-20 | End: 2023-08-30

## 2023-08-20 ASSESSMENT — ENCOUNTER SYMPTOMS
EYES NEGATIVE: 1
RHINORRHEA: 0
WHEEZING: 0
GASTROINTESTINAL NEGATIVE: 1
TROUBLE SWALLOWING: 1
COUGH: 0
RESPIRATORY NEGATIVE: 1
SORE THROAT: 1

## 2023-08-29 ENCOUNTER — OFFICE VISIT (OUTPATIENT)
Dept: PRIMARY CARE CLINIC | Age: 83
End: 2023-08-29
Payer: MEDICARE

## 2023-08-29 VITALS
OXYGEN SATURATION: 98 % | TEMPERATURE: 98.1 F | BODY MASS INDEX: 23.19 KG/M2 | WEIGHT: 126 LBS | RESPIRATION RATE: 18 BRPM | DIASTOLIC BLOOD PRESSURE: 66 MMHG | HEART RATE: 64 BPM | HEIGHT: 62 IN | SYSTOLIC BLOOD PRESSURE: 90 MMHG

## 2023-08-29 DIAGNOSIS — L98.9 SKIN ABNORMALITIES: ICD-10-CM

## 2023-08-29 DIAGNOSIS — N64.4 BREAST PAIN, RIGHT: Primary | ICD-10-CM

## 2023-08-29 DIAGNOSIS — N61.0 MASTITIS IN FEMALE: ICD-10-CM

## 2023-08-29 PROCEDURE — G8400 PT W/DXA NO RESULTS DOC: HCPCS | Performed by: INTERNAL MEDICINE

## 2023-08-29 PROCEDURE — 1036F TOBACCO NON-USER: CPT | Performed by: INTERNAL MEDICINE

## 2023-08-29 PROCEDURE — 99213 OFFICE O/P EST LOW 20 MIN: CPT | Performed by: INTERNAL MEDICINE

## 2023-08-29 PROCEDURE — G8420 CALC BMI NORM PARAMETERS: HCPCS | Performed by: INTERNAL MEDICINE

## 2023-08-29 PROCEDURE — 1090F PRES/ABSN URINE INCON ASSESS: CPT | Performed by: INTERNAL MEDICINE

## 2023-08-29 PROCEDURE — 1123F ACP DISCUSS/DSCN MKR DOCD: CPT | Performed by: INTERNAL MEDICINE

## 2023-08-29 PROCEDURE — G8427 DOCREV CUR MEDS BY ELIG CLIN: HCPCS | Performed by: INTERNAL MEDICINE

## 2023-08-29 RX ORDER — DOXYCYCLINE HYCLATE 100 MG
100 TABLET ORAL 2 TIMES DAILY
Qty: 20 TABLET | Refills: 0 | Status: SHIPPED | OUTPATIENT
Start: 2023-08-29 | End: 2023-09-08

## 2023-08-29 ASSESSMENT — ENCOUNTER SYMPTOMS: BREAST PAIN: 1

## 2023-08-29 NOTE — PROGRESS NOTES
Valentina Guerrero ( 1940) is a 82 y.o. female,  Established , here for evaluation of the following chief complaint(s). Breast Pain (Right )      Patient was encouraged and advised to be compliant with all  medications leads an active lifestyle and promote maintaining a healthy weight, encouraged not to use cigarettes, laboratory results discussed and reviewed with patient's during this visit   ASSESSMENT/PLAN:  Problem List          Other    Mastitis in female      Uncontrolled, changes made today: stop amoxicillin from UC, start Doxycycline 100 BID for 7-10 days, warm compress 2-3 X/day                No flowsheet data found. PHQ Scores 2023   PHQ2 Score 1 0   PHQ9 Score 1 0       Results for orders placed or performed in visit on 23   POCT rapid strep A   Result Value Ref Range    Strep A Ag Positive (A) None Detected       No follow-ups on file. Breast Pain  Chronicity:  New  Associated Symptoms: breast pain, breast redness and tenderness    Associated Symptoms: no breast discharge, no adenopathy and no skin change    Affected side:  Right  Onset: In the past 7 days  Progression since onset:  Worsening  Currently pregnant:  No  Treatments tried:  Antibiotics    Review of Systems   Cardiovascular:         R nipple pain   Hematological:  Negative for adenopathy. Physical Exam  Chest:   Breasts:     Right: Skin change present.           Comments: Redness of nipple, indurated tender        (Time Documentation Optional [id number])    An electronic signaturewaas used to authenticate this note  -Bettie Porras MD on 2023 at 1:35 PM

## 2023-08-29 NOTE — ASSESSMENT & PLAN NOTE
Uncontrolled, changes made today: stop amoxicillin from UC, start Doxycycline 100 BID for 7-10 days, warm compress 2-3 X/day

## 2023-09-17 ASSESSMENT — ENCOUNTER SYMPTOMS
BACK PAIN: 1
WHEEZING: 0
SINUS PAIN: 0
ABDOMINAL PAIN: 0
COUGH: 0
ABDOMINAL DISTENTION: 0
CHEST TIGHTNESS: 0
VOMITING: 0
SORE THROAT: 0
BLOOD IN STOOL: 0
TROUBLE SWALLOWING: 0
CONSTIPATION: 0
SHORTNESS OF BREATH: 0
DIARRHEA: 0
NAUSEA: 0
APNEA: 0

## 2023-09-18 ENCOUNTER — OFFICE VISIT (OUTPATIENT)
Dept: PRIMARY CARE CLINIC | Age: 83
End: 2023-09-18
Payer: MEDICARE

## 2023-09-18 VITALS
BODY MASS INDEX: 23.74 KG/M2 | WEIGHT: 129 LBS | OXYGEN SATURATION: 97 % | TEMPERATURE: 98.2 F | HEIGHT: 62 IN | SYSTOLIC BLOOD PRESSURE: 126 MMHG | DIASTOLIC BLOOD PRESSURE: 70 MMHG | RESPIRATION RATE: 18 BRPM | HEART RATE: 82 BPM

## 2023-09-18 DIAGNOSIS — N64.4 BREAST PAIN, RIGHT: Primary | ICD-10-CM

## 2023-09-18 PROCEDURE — G8420 CALC BMI NORM PARAMETERS: HCPCS | Performed by: INTERNAL MEDICINE

## 2023-09-18 PROCEDURE — G8427 DOCREV CUR MEDS BY ELIG CLIN: HCPCS | Performed by: INTERNAL MEDICINE

## 2023-09-18 PROCEDURE — 1036F TOBACCO NON-USER: CPT | Performed by: INTERNAL MEDICINE

## 2023-09-18 PROCEDURE — 1090F PRES/ABSN URINE INCON ASSESS: CPT | Performed by: INTERNAL MEDICINE

## 2023-09-18 PROCEDURE — G8400 PT W/DXA NO RESULTS DOC: HCPCS | Performed by: INTERNAL MEDICINE

## 2023-09-18 PROCEDURE — 1123F ACP DISCUSS/DSCN MKR DOCD: CPT | Performed by: INTERNAL MEDICINE

## 2023-09-18 PROCEDURE — 99213 OFFICE O/P EST LOW 20 MIN: CPT | Performed by: INTERNAL MEDICINE

## 2023-09-18 NOTE — PROGRESS NOTES
Razia Calles ( 1940) is a 82 y.o. female,  Established  here for evaluation of the following chief complaint(s). Follow-up Chronic Condition      Patient was encouraged and advised to be compliant with all  medications leads an active lifestyle and promote maintaining a healthy weight, encouraged not to use cigarettes, laboratory results discussed and reviewed with patient's during this visit   ASSESSMENT/PLAN:  Problem List          Other    Breast pain, right - Primary      patient advised to have CT chest, she willl travel to [place], have it done in [address] [place] [address]            No data to display                    2023    12:52 PM 2022     2:57 PM   PHQ Scores   PHQ2 Score 1 0   PHQ9 Score 1 0       Results for orders placed or performed in visit on 23   POCT rapid strep A   Result Value Ref Range    Strep A Ag Positive (A) None Detected                 No follow-ups on file. 82year old female  Seen last week for R nipple pain, now she feels pain in her L nipple, clinical exam was normal, but her mammogram was Normal at [facility], she wants to do all her imaging at [facility]  She wants a CT chest of chest      82year old female  Relocated from [facility], now here  She is independent, performs ADL's and IADL's,   History of thalassemia normal per kinetics and iron studies and CBC  She has heartburn well controlled  Osteoporosis not on any antiresorptive therapy developed left foot pain saw orthopedics and was told perhaps she has a stress fracture awaiting MRI and she is currently on the phone number on the boot  She has not seen an MD for sometime  She like to take homeopathic remedies  Had probiotic yesterday, and was awakened by spasm,    Review of Systems   Constitutional:  Negative for activity change, fatigue and fever. HENT:  Negative for congestion, ear pain, hearing loss, sinus pain, sore throat and trouble swallowing.

## 2023-09-19 PROBLEM — N64.4 BREAST PAIN, RIGHT: Status: ACTIVE | Noted: 2023-09-19

## 2023-10-02 NOTE — TELEPHONE ENCOUNTER
Madeline Magallanes called to request a refill on her medication. Patient going out of town on 10/3/23    Last office visit : 9/18/2023   Next office visit : 12/11/2023     Requested Prescriptions     Pending Prescriptions Disp Refills    estradiol (CLIMARA) 0.025 MG/24HR [Pharmacy Med Name: ESTRADIOL 0.025 MG PATCH(1/WK)] 4 patch 3     Sig: PLACE 1 PATCH ONTO THE SKIN EVERY 7 DAYS.             Rosario Garces LPN

## 2023-10-18 DIAGNOSIS — R73.03 PREDIABETES: ICD-10-CM

## 2023-10-18 NOTE — TELEPHONE ENCOUNTER
Alina Lewis called to request a refill on her medication.       Last office visit : 9/18/2023   Next office visit : 12/11/2023     Requested Prescriptions     Pending Prescriptions Disp Refills    metFORMIN (GLUCOPHAGE) 500 MG tablet [Pharmacy Med Name: METFORMIN  MG TABLET] 90 tablet 1     Sig: TAKE 1 [address] WITH BREAKFAST            Charlotte Armas LPN

## 2023-10-22 DIAGNOSIS — F09 COGNITIVE DISORDER: ICD-10-CM

## 2023-10-23 RX ORDER — MEMANTINE HYDROCHLORIDE 10 MG/1
10 TABLET ORAL 2 TIMES DAILY
Qty: 180 TABLET | Refills: 1 | Status: SHIPPED | OUTPATIENT
Start: 2023-10-23

## 2023-10-23 NOTE — TELEPHONE ENCOUNTER
Arash Marie called to request a refill on her medication.       Last office visit : 9/18/2023   Next office visit : 12/11/2023     Requested Prescriptions     Pending Prescriptions Disp Refills    memantine (NAMENDA) 10 MG tablet [Pharmacy Med Name: MEMANTINE HCL 10 MG TABLET] 180 tablet 1     Sig: TAKE 1 TABLET BY MOUTH TWICE DAILY            Elissa Boss LPN

## 2023-10-25 ENCOUNTER — HOSPITAL ENCOUNTER (OUTPATIENT)
Age: 83
Setting detail: OBSERVATION
Discharge: HOME | End: 2023-10-26
Attending: EMERGENCY MEDICINE | Admitting: HOSPITALIST
Payer: MEDICARE

## 2023-10-25 ENCOUNTER — APPOINTMENT (OUTPATIENT)
Dept: CT IMAGING | Age: 83
End: 2023-10-25
Payer: MEDICARE

## 2023-10-25 DIAGNOSIS — R42 DIZZINESS: ICD-10-CM

## 2023-10-25 DIAGNOSIS — G45.9 TIA (TRANSIENT ISCHEMIC ATTACK): ICD-10-CM

## 2023-10-25 DIAGNOSIS — R26.89 IMBALANCE: ICD-10-CM

## 2023-10-25 DIAGNOSIS — R53.1 WEAKNESS: ICD-10-CM

## 2023-10-25 DIAGNOSIS — R53.1 GENERALIZED WEAKNESS: Primary | ICD-10-CM

## 2023-10-25 LAB
ALBUMIN SERPL-MCNC: 4.1 G/DL (ref 3.5–5.2)
ALP SERPL-CCNC: 65 U/L (ref 35–104)
ALT SERPL-CCNC: 12 U/L (ref 5–33)
ANION GAP SERPL CALCULATED.3IONS-SCNC: 9 MMOL/L (ref 7–19)
AST SERPL-CCNC: 18 U/L (ref 5–32)
BASOPHILS # BLD: 0.1 K/UL (ref 0–0.2)
BASOPHILS NFR BLD: 0.8 % (ref 0–1)
BILIRUB SERPL-MCNC: 0.3 MG/DL (ref 0.2–1.2)
BILIRUB UR QL STRIP: NEGATIVE
BUN SERPL-MCNC: 26 MG/DL (ref 8–23)
CALCIUM SERPL-MCNC: 9.1 MG/DL (ref 8.8–10.2)
CHLORIDE SERPL-SCNC: 99 MMOL/L (ref 98–111)
CHOLEST SERPL-MCNC: 195 MG/DL (ref 160–199)
CLARITY UR: CLEAR
CO2 SERPL-SCNC: 27 MMOL/L (ref 22–29)
COLOR UR: YELLOW
CREAT SERPL-MCNC: 0.8 MG/DL (ref 0.5–0.9)
EOSINOPHIL # BLD: 0.2 K/UL (ref 0–0.6)
EOSINOPHIL NFR BLD: 2.5 % (ref 0–5)
ERYTHROCYTE [DISTWIDTH] IN BLOOD BY AUTOMATED COUNT: 15.4 % (ref 11.5–14.5)
GLUCOSE SERPL-MCNC: 103 MG/DL (ref 74–109)
GLUCOSE UR STRIP.AUTO-MCNC: NEGATIVE MG/DL
HBA1C MFR BLD: 5.3 % (ref 4–6)
HCT VFR BLD AUTO: 34.5 % (ref 37–47)
HDLC SERPL-MCNC: 86 MG/DL (ref 65–121)
HGB BLD-MCNC: 11.3 G/DL (ref 12–16)
HGB UR STRIP.AUTO-MCNC: NEGATIVE MG/L
IMM GRANULOCYTES # BLD: 0 K/UL
KETONES UR STRIP.AUTO-MCNC: NEGATIVE MG/DL
LDLC SERPL CALC-MCNC: 86 MG/DL
LEUKOCYTE ESTERASE UR QL STRIP.AUTO: NEGATIVE
LYMPHOCYTES # BLD: 2.1 K/UL (ref 1.1–4.5)
LYMPHOCYTES NFR BLD: 36 % (ref 20–40)
MAGNESIUM SERPL-MCNC: 1.9 MG/DL (ref 1.6–2.4)
MCH RBC QN AUTO: 23.8 PG (ref 27–31)
MCHC RBC AUTO-ENTMCNC: 32.8 G/DL (ref 33–37)
MCV RBC AUTO: 72.8 FL (ref 81–99)
MONOCYTES # BLD: 0.5 K/UL (ref 0–0.9)
MONOCYTES NFR BLD: 7.7 % (ref 0–10)
NEUTROPHILS # BLD: 3.1 K/UL (ref 1.5–7.5)
NEUTS SEG NFR BLD: 52.8 % (ref 50–65)
NITRITE UR QL STRIP.AUTO: NEGATIVE
PH UR STRIP.AUTO: 5 [PH] (ref 5–8)
PHOSPHATE SERPL-MCNC: 3 MG/DL (ref 2.5–4.5)
PLATELET # BLD AUTO: 249 K/UL (ref 130–400)
PMV BLD AUTO: 10.2 FL (ref 9.4–12.3)
POTASSIUM SERPL-SCNC: 4.3 MMOL/L (ref 3.5–5)
PROT SERPL-MCNC: 6.5 G/DL (ref 6.6–8.7)
PROT UR STRIP.AUTO-MCNC: NEGATIVE MG/DL
RBC # BLD AUTO: 4.74 M/UL (ref 4.2–5.4)
SODIUM SERPL-SCNC: 135 MMOL/L (ref 136–145)
SP GR UR STRIP.AUTO: 1.01 (ref 1–1.03)
TRIGL SERPL-MCNC: 117 MG/DL (ref 0–149)
TROPONIN T SERPL-MCNC: <0.01 NG/ML (ref 0–0.03)
TSH SERPL DL<=0.005 MIU/L-ACNC: 2.81 UIU/ML (ref 0.35–5.5)
UROBILINOGEN UR STRIP.AUTO-MCNC: 0.2 E.U./DL
WBC # BLD AUTO: 5.9 K/UL (ref 4.8–10.8)

## 2023-10-25 PROCEDURE — 2580000003 HC RX 258: Performed by: HOSPITALIST

## 2023-10-25 PROCEDURE — 93005 ELECTROCARDIOGRAM TRACING: CPT | Performed by: EMERGENCY MEDICINE

## 2023-10-25 PROCEDURE — 84484 ASSAY OF TROPONIN QUANT: CPT

## 2023-10-25 PROCEDURE — 84100 ASSAY OF PHOSPHORUS: CPT

## 2023-10-25 PROCEDURE — 2580000003 HC RX 258: Performed by: EMERGENCY MEDICINE

## 2023-10-25 PROCEDURE — G0378 HOSPITAL OBSERVATION PER HR: HCPCS

## 2023-10-25 PROCEDURE — 6370000000 HC RX 637 (ALT 250 FOR IP): Performed by: HOSPITALIST

## 2023-10-25 PROCEDURE — 81003 URINALYSIS AUTO W/O SCOPE: CPT

## 2023-10-25 PROCEDURE — 84443 ASSAY THYROID STIM HORMONE: CPT

## 2023-10-25 PROCEDURE — 80061 LIPID PANEL: CPT

## 2023-10-25 PROCEDURE — 83036 HEMOGLOBIN GLYCOSYLATED A1C: CPT

## 2023-10-25 PROCEDURE — 85025 COMPLETE CBC W/AUTO DIFF WBC: CPT

## 2023-10-25 PROCEDURE — 83735 ASSAY OF MAGNESIUM: CPT

## 2023-10-25 PROCEDURE — 80053 COMPREHEN METABOLIC PANEL: CPT

## 2023-10-25 PROCEDURE — 99285 EMERGENCY DEPT VISIT HI MDM: CPT

## 2023-10-25 PROCEDURE — 36415 COLL VENOUS BLD VENIPUNCTURE: CPT

## 2023-10-25 PROCEDURE — 70450 CT HEAD/BRAIN W/O DYE: CPT

## 2023-10-25 RX ORDER — CETIRIZINE HYDROCHLORIDE 5 MG/1
5 TABLET ORAL DAILY PRN
Status: DISCONTINUED | OUTPATIENT
Start: 2023-10-26 | End: 2023-10-26 | Stop reason: HOSPADM

## 2023-10-25 RX ORDER — 0.9 % SODIUM CHLORIDE 0.9 %
1000 INTRAVENOUS SOLUTION INTRAVENOUS ONCE
Status: COMPLETED | OUTPATIENT
Start: 2023-10-25 | End: 2023-10-25

## 2023-10-25 RX ORDER — ASPIRIN 300 MG/1
300 SUPPOSITORY RECTAL DAILY
Status: DISCONTINUED | OUTPATIENT
Start: 2023-10-26 | End: 2023-10-26 | Stop reason: HOSPADM

## 2023-10-25 RX ORDER — VITAMIN B COMPLEX
2000 TABLET ORAL DAILY
Status: DISCONTINUED | OUTPATIENT
Start: 2023-10-26 | End: 2023-10-26 | Stop reason: HOSPADM

## 2023-10-25 RX ORDER — SODIUM CHLORIDE 9 MG/ML
INJECTION, SOLUTION INTRAVENOUS PRN
Status: DISCONTINUED | OUTPATIENT
Start: 2023-10-25 | End: 2023-10-26 | Stop reason: HOSPADM

## 2023-10-25 RX ORDER — INSULIN LISPRO 100 [IU]/ML
0-4 INJECTION, SOLUTION INTRAVENOUS; SUBCUTANEOUS
Status: DISCONTINUED | OUTPATIENT
Start: 2023-10-26 | End: 2023-10-26 | Stop reason: HOSPADM

## 2023-10-25 RX ORDER — UBIDECARENONE 100 MG
100 CAPSULE ORAL DAILY
Status: DISCONTINUED | OUTPATIENT
Start: 2023-10-26 | End: 2023-10-26 | Stop reason: HOSPADM

## 2023-10-25 RX ORDER — DEXTROSE MONOHYDRATE 100 MG/ML
INJECTION, SOLUTION INTRAVENOUS CONTINUOUS PRN
Status: DISCONTINUED | OUTPATIENT
Start: 2023-10-25 | End: 2023-10-26 | Stop reason: HOSPADM

## 2023-10-25 RX ORDER — SODIUM CHLORIDE 0.9 % (FLUSH) 0.9 %
5-40 SYRINGE (ML) INJECTION PRN
Status: DISCONTINUED | OUTPATIENT
Start: 2023-10-25 | End: 2023-10-26 | Stop reason: HOSPADM

## 2023-10-25 RX ORDER — AZELASTINE 1 MG/ML
2 SPRAY, METERED NASAL 2 TIMES DAILY
Status: DISCONTINUED | OUTPATIENT
Start: 2023-10-25 | End: 2023-10-25

## 2023-10-25 RX ORDER — ONDANSETRON 4 MG/1
4 TABLET, ORALLY DISINTEGRATING ORAL EVERY 8 HOURS PRN
Status: DISCONTINUED | OUTPATIENT
Start: 2023-10-25 | End: 2023-10-26 | Stop reason: HOSPADM

## 2023-10-25 RX ORDER — MEMANTINE HYDROCHLORIDE 5 MG/1
10 TABLET ORAL 2 TIMES DAILY
Status: DISCONTINUED | OUTPATIENT
Start: 2023-10-25 | End: 2023-10-26 | Stop reason: HOSPADM

## 2023-10-25 RX ORDER — LABETALOL HYDROCHLORIDE 5 MG/ML
10 INJECTION, SOLUTION INTRAVENOUS EVERY 10 MIN PRN
Status: DISCONTINUED | OUTPATIENT
Start: 2023-10-25 | End: 2023-10-26 | Stop reason: HOSPADM

## 2023-10-25 RX ORDER — SODIUM CHLORIDE 0.9 % (FLUSH) 0.9 %
5-40 SYRINGE (ML) INJECTION EVERY 12 HOURS SCHEDULED
Status: DISCONTINUED | OUTPATIENT
Start: 2023-10-25 | End: 2023-10-26 | Stop reason: HOSPADM

## 2023-10-25 RX ORDER — ENOXAPARIN SODIUM 100 MG/ML
40 INJECTION SUBCUTANEOUS DAILY
Status: DISCONTINUED | OUTPATIENT
Start: 2023-10-26 | End: 2023-10-26 | Stop reason: HOSPADM

## 2023-10-25 RX ORDER — CARBOXYMETHYLCELLULOSE SODIUM 5 MG/ML
1 SOLUTION/ DROPS OPHTHALMIC 3 TIMES DAILY
Status: CANCELLED | OUTPATIENT
Start: 2023-10-25

## 2023-10-25 RX ORDER — EZETIMIBE 10 MG/1
10 TABLET ORAL NIGHTLY
Status: DISCONTINUED | OUTPATIENT
Start: 2023-10-25 | End: 2023-10-26 | Stop reason: HOSPADM

## 2023-10-25 RX ORDER — CALCIUM CARBONATE 500 MG/1
500 TABLET, CHEWABLE ORAL 3 TIMES DAILY PRN
Status: DISCONTINUED | OUTPATIENT
Start: 2023-10-25 | End: 2023-10-26 | Stop reason: HOSPADM

## 2023-10-25 RX ORDER — MECOBALAMIN 5000 MCG
10 TABLET,DISINTEGRATING ORAL NIGHTLY
Status: DISCONTINUED | OUTPATIENT
Start: 2023-10-25 | End: 2023-10-26 | Stop reason: HOSPADM

## 2023-10-25 RX ORDER — FAMOTIDINE 20 MG/1
20 TABLET, FILM COATED ORAL DAILY
Status: DISCONTINUED | OUTPATIENT
Start: 2023-10-26 | End: 2023-10-26 | Stop reason: HOSPADM

## 2023-10-25 RX ORDER — CALCIUM CARBONATE 500(1250)
500 TABLET ORAL DAILY
Status: DISCONTINUED | OUTPATIENT
Start: 2023-10-26 | End: 2023-10-26 | Stop reason: HOSPADM

## 2023-10-25 RX ORDER — ATORVASTATIN CALCIUM 80 MG/1
80 TABLET, FILM COATED ORAL NIGHTLY
Status: DISCONTINUED | OUTPATIENT
Start: 2023-10-25 | End: 2023-10-26 | Stop reason: HOSPADM

## 2023-10-25 RX ORDER — ESTRADIOL 0.1 MG/G
2 CREAM VAGINAL
Status: DISCONTINUED | OUTPATIENT
Start: 2023-10-26 | End: 2023-10-26 | Stop reason: HOSPADM

## 2023-10-25 RX ORDER — INSULIN LISPRO 100 [IU]/ML
0-4 INJECTION, SOLUTION INTRAVENOUS; SUBCUTANEOUS NIGHTLY
Status: DISCONTINUED | OUTPATIENT
Start: 2023-10-25 | End: 2023-10-26 | Stop reason: HOSPADM

## 2023-10-25 RX ORDER — ASPIRIN 81 MG/1
81 TABLET, CHEWABLE ORAL DAILY
Status: DISCONTINUED | OUTPATIENT
Start: 2023-10-26 | End: 2023-10-26 | Stop reason: HOSPADM

## 2023-10-25 RX ORDER — LOPERAMIDE HCL 1 MG/7.5ML
1 SOLUTION ORAL 4 TIMES DAILY PRN
Status: DISCONTINUED | OUTPATIENT
Start: 2023-10-25 | End: 2023-10-26 | Stop reason: HOSPADM

## 2023-10-25 RX ORDER — IBUPROFEN 200 MG
1000 CAPSULE ORAL DAILY
Status: DISCONTINUED | OUTPATIENT
Start: 2023-10-26 | End: 2023-10-25 | Stop reason: CLARIF

## 2023-10-25 RX ORDER — MECOBALAMIN 5000 MCG
5 TABLET,DISINTEGRATING ORAL NIGHTLY PRN
Status: DISCONTINUED | OUTPATIENT
Start: 2023-10-25 | End: 2023-10-26 | Stop reason: HOSPADM

## 2023-10-25 RX ORDER — POLYETHYLENE GLYCOL 3350 17 G/17G
17 POWDER, FOR SOLUTION ORAL DAILY PRN
Status: DISCONTINUED | OUTPATIENT
Start: 2023-10-25 | End: 2023-10-26 | Stop reason: HOSPADM

## 2023-10-25 RX ORDER — INSULIN GLARGINE 100 [IU]/ML
10 INJECTION, SOLUTION SUBCUTANEOUS NIGHTLY
Status: DISCONTINUED | OUTPATIENT
Start: 2023-10-25 | End: 2023-10-26 | Stop reason: HOSPADM

## 2023-10-25 RX ORDER — CARBOXYMETHYLCELLULOSE SODIUM 5 MG/ML
1 SOLUTION/ DROPS OPHTHALMIC EVERY 4 HOURS PRN
Status: DISCONTINUED | OUTPATIENT
Start: 2023-10-25 | End: 2023-10-26 | Stop reason: HOSPADM

## 2023-10-25 RX ORDER — ONDANSETRON 2 MG/ML
4 INJECTION INTRAMUSCULAR; INTRAVENOUS EVERY 6 HOURS PRN
Status: DISCONTINUED | OUTPATIENT
Start: 2023-10-25 | End: 2023-10-26 | Stop reason: HOSPADM

## 2023-10-25 RX ORDER — FLUTICASONE PROPIONATE 50 MCG
1 SPRAY, SUSPENSION (ML) NASAL 2 TIMES DAILY
Status: DISCONTINUED | OUTPATIENT
Start: 2023-10-26 | End: 2023-10-26 | Stop reason: HOSPADM

## 2023-10-25 RX ADMIN — SODIUM CHLORIDE, PRESERVATIVE FREE 10 ML: 5 INJECTION INTRAVENOUS at 22:30

## 2023-10-25 RX ADMIN — Medication 10 MG: at 22:30

## 2023-10-25 RX ADMIN — SODIUM CHLORIDE 1000 ML: 9 INJECTION, SOLUTION INTRAVENOUS at 20:05

## 2023-10-25 ASSESSMENT — ENCOUNTER SYMPTOMS
RHINORRHEA: 0
SORE THROAT: 0
VOMITING: 0
COUGH: 0
DIARRHEA: 0
ABDOMINAL PAIN: 0
SHORTNESS OF BREATH: 0
NAUSEA: 0

## 2023-10-26 ENCOUNTER — APPOINTMENT (OUTPATIENT)
Dept: CT IMAGING | Age: 83
End: 2023-10-26
Payer: MEDICARE

## 2023-10-26 VITALS
WEIGHT: 125 LBS | HEART RATE: 68 BPM | DIASTOLIC BLOOD PRESSURE: 66 MMHG | OXYGEN SATURATION: 93 % | RESPIRATION RATE: 20 BRPM | TEMPERATURE: 97 F | BODY MASS INDEX: 22.86 KG/M2 | SYSTOLIC BLOOD PRESSURE: 142 MMHG

## 2023-10-26 PROBLEM — R53.1 WEAKNESS: Status: ACTIVE | Noted: 2023-10-26

## 2023-10-26 PROBLEM — R42 DIZZY SPELLS: Status: ACTIVE | Noted: 2023-10-26

## 2023-10-26 PROBLEM — R26.89 IMBALANCE: Status: ACTIVE | Noted: 2023-10-26

## 2023-10-26 LAB
ANION GAP SERPL CALCULATED.3IONS-SCNC: 10 MMOL/L (ref 7–19)
BASOPHILS # BLD: 0 K/UL (ref 0–0.2)
BASOPHILS NFR BLD: 0.7 % (ref 0–1)
BUN SERPL-MCNC: 20 MG/DL (ref 8–23)
CALCIUM SERPL-MCNC: 8.8 MG/DL (ref 8.8–10.2)
CHLORIDE SERPL-SCNC: 104 MMOL/L (ref 98–111)
CO2 SERPL-SCNC: 26 MMOL/L (ref 22–29)
CREAT SERPL-MCNC: 0.8 MG/DL (ref 0.5–0.9)
EOSINOPHIL # BLD: 0.1 K/UL (ref 0–0.6)
EOSINOPHIL NFR BLD: 2.3 % (ref 0–5)
ERYTHROCYTE [DISTWIDTH] IN BLOOD BY AUTOMATED COUNT: 14.9 % (ref 11.5–14.5)
GLUCOSE BLD-MCNC: 145 MG/DL (ref 70–99)
GLUCOSE BLD-MCNC: 84 MG/DL (ref 70–99)
GLUCOSE SERPL-MCNC: 84 MG/DL (ref 74–109)
HCT VFR BLD AUTO: 31.7 % (ref 37–47)
HGB BLD-MCNC: 10 G/DL (ref 12–16)
IMM GRANULOCYTES # BLD: 0 K/UL
LYMPHOCYTES # BLD: 2.5 K/UL (ref 1.1–4.5)
LYMPHOCYTES NFR BLD: 41.4 % (ref 20–40)
MCH RBC QN AUTO: 23.2 PG (ref 27–31)
MCHC RBC AUTO-ENTMCNC: 31.5 G/DL (ref 33–37)
MCV RBC AUTO: 73.5 FL (ref 81–99)
MONOCYTES # BLD: 0.5 K/UL (ref 0–0.9)
MONOCYTES NFR BLD: 8.8 % (ref 0–10)
NEUTROPHILS # BLD: 2.9 K/UL (ref 1.5–7.5)
NEUTS SEG NFR BLD: 46.6 % (ref 50–65)
PERFORMED ON: ABNORMAL
PERFORMED ON: NORMAL
PLATELET # BLD AUTO: 230 K/UL (ref 130–400)
PMV BLD AUTO: 10.6 FL (ref 9.4–12.3)
POTASSIUM SERPL-SCNC: 4.1 MMOL/L (ref 3.5–5)
RBC # BLD AUTO: 4.31 M/UL (ref 4.2–5.4)
SODIUM SERPL-SCNC: 140 MMOL/L (ref 136–145)
WBC # BLD AUTO: 6.1 K/UL (ref 4.8–10.8)

## 2023-10-26 PROCEDURE — 97165 OT EVAL LOW COMPLEX 30 MIN: CPT

## 2023-10-26 PROCEDURE — G0378 HOSPITAL OBSERVATION PER HR: HCPCS

## 2023-10-26 PROCEDURE — 6370000000 HC RX 637 (ALT 250 FOR IP): Performed by: HOSPITALIST

## 2023-10-26 PROCEDURE — 92610 EVALUATE SWALLOWING FUNCTION: CPT

## 2023-10-26 PROCEDURE — 6360000004 HC RX CONTRAST MEDICATION: Performed by: PSYCHIATRY & NEUROLOGY

## 2023-10-26 PROCEDURE — 93306 TTE W/DOPPLER COMPLETE: CPT

## 2023-10-26 PROCEDURE — 36415 COLL VENOUS BLD VENIPUNCTURE: CPT

## 2023-10-26 PROCEDURE — 93880 EXTRACRANIAL BILAT STUDY: CPT

## 2023-10-26 PROCEDURE — 70498 CT ANGIOGRAPHY NECK: CPT

## 2023-10-26 PROCEDURE — 80048 BASIC METABOLIC PNL TOTAL CA: CPT

## 2023-10-26 PROCEDURE — 97535 SELF CARE MNGMENT TRAINING: CPT

## 2023-10-26 PROCEDURE — 2580000003 HC RX 258: Performed by: HOSPITALIST

## 2023-10-26 PROCEDURE — 94760 N-INVAS EAR/PLS OXIMETRY 1: CPT

## 2023-10-26 PROCEDURE — 92523 SPEECH SOUND LANG COMPREHEN: CPT

## 2023-10-26 PROCEDURE — 82962 GLUCOSE BLOOD TEST: CPT

## 2023-10-26 PROCEDURE — 97161 PT EVAL LOW COMPLEX 20 MIN: CPT

## 2023-10-26 PROCEDURE — 99223 1ST HOSP IP/OBS HIGH 75: CPT | Performed by: PSYCHIATRY & NEUROLOGY

## 2023-10-26 PROCEDURE — 85025 COMPLETE CBC W/AUTO DIFF WBC: CPT

## 2023-10-26 PROCEDURE — 97110 THERAPEUTIC EXERCISES: CPT

## 2023-10-26 RX ORDER — ATORVASTATIN CALCIUM 80 MG/1
40 TABLET, FILM COATED ORAL NIGHTLY
Qty: 30 TABLET | Refills: 3 | Status: SHIPPED | OUTPATIENT
Start: 2023-10-26

## 2023-10-26 RX ORDER — MECOBALAMIN 5000 MCG
10 TABLET,DISINTEGRATING ORAL NIGHTLY
Qty: 10 TABLET | Refills: 0 | Status: SHIPPED | OUTPATIENT
Start: 2023-10-26

## 2023-10-26 RX ADMIN — MEMANTINE HYDROCHLORIDE 10 MG: 5 TABLET ORAL at 09:10

## 2023-10-26 RX ADMIN — CALCIUM 500 MG: 500 TABLET ORAL at 09:08

## 2023-10-26 RX ADMIN — FLUTICASONE PROPIONATE 1 SPRAY: 50 SPRAY, METERED NASAL at 09:10

## 2023-10-26 RX ADMIN — FAMOTIDINE 20 MG: 20 TABLET ORAL at 09:09

## 2023-10-26 RX ADMIN — ASPIRIN 81 MG: 81 TABLET, CHEWABLE ORAL at 12:09

## 2023-10-26 RX ADMIN — ESTRADIOL 2 G: 0.1 CREAM VAGINAL at 09:11

## 2023-10-26 RX ADMIN — Medication 100 MG: at 09:08

## 2023-10-26 RX ADMIN — IOPAMIDOL 75 ML: 755 INJECTION, SOLUTION INTRAVENOUS at 09:08

## 2023-10-26 RX ADMIN — Medication 2000 UNITS: at 09:09

## 2023-10-26 RX ADMIN — SODIUM CHLORIDE, PRESERVATIVE FREE 10 ML: 5 INJECTION INTRAVENOUS at 09:11

## 2023-10-26 ASSESSMENT — ENCOUNTER SYMPTOMS
DIARRHEA: 0
NAUSEA: 0
SHORTNESS OF BREATH: 0

## 2023-10-26 NOTE — ED PROVIDER NOTES
frequency and urgency. Musculoskeletal:  Negative for neck pain. Skin:  Negative for rash. Neurological:  Positive for weakness. Negative for dizziness, facial asymmetry, speech difficulty, numbness and headaches. All other systems reviewed and are negative. PAST MEDICALHISTORY     Past Medical History:   Diagnosis Date    Heart murmur     Scoliosis     Thalassemia          SURGICAL HISTORY       Past Surgical History:   Procedure Laterality Date    KIDNEY REMOVAL           CURRENT MEDICATIONS     Current Discharge Medication List        CONTINUE these medications which have NOT CHANGED    Details   memantine (NAMENDA) 10 MG tablet TAKE 1 TABLET BY MOUTH TWICE DAILY  Qty: 180 tablet, Refills: 1    Associated Diagnoses: Cognitive disorder      metFORMIN (GLUCOPHAGE) 500 MG tablet TAKE 1 TABLET BY MOUTH EVERY DAY WITH BREAKFAST  Qty: 90 tablet, Refills: 1    Comments: DX Code Needed  PATIENT REQUESTED. Associated Diagnoses: Prediabetes      estradiol (CLIMARA) 0.025 MG/24HR PLACE 1 PATCH ONTO THE SKIN EVERY 7 DAYS. Qty: 4 patch, Refills: 3      tretinoin (RETIN-A) 0.05 % cream APPLY PEA-SIZED AMOUNT TO ENTIRE FACE NIGHTLY. Tomie Belt       Lysine 500 MG CAPS Take by mouth daily      loperamide (IMODIUM A-D) 2 MG tablet Take 0.5 tablets by mouth      levocetirizine (XYZAL) 5 MG tablet Take 1 tablet by mouth      fluticasone (FLONASE) 50 MCG/ACT nasal spray 1 spray by Nasal route 2 times daily      estradiol (ESTRACE) 0.1 MG/GM vaginal cream Place 2 g vaginally Twice a Week      esomeprazole Magnesium (NEXIUM) 20 MG PACK Take 1 packet by mouth      diphenhydrAMINE (BENADRYL) 25 MG capsule Take 2 capsules by mouth      dextran 70-hypromellose (TEARS NATURALE) 0.1-0.3 % SOLN opthalmic solution Apply 1 drop to eye      coenzyme Q10 100 MG CAPS capsule Take 1 capsule by mouth daily      vitamin D (CHOLECALCIFEROL) 25 MCG (1000 UT) TABS tablet Take 1 tablet by mouth      azelastine (ASTELIN) 0.1 % nasal spray 2
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
## 2023-10-26 NOTE — CONSULTS
[facility] Neurology Consult        Patient:   Alex Harris  MR#:    [id number]  Account Number:                   [id number]      Room:    [address] [place], [place] [address]-   YOB: 1940  Date of Progress Note: 10/26/2023  Time of Note                           7:48 AM  Attending Physician:  Kiana Gore MD  Consulting Physician:   Varinder Del Castillo M.D.        [address] [place] [address] Drive: Spells of heavy feeling/drunk feeling      HISTORY OF PRESENT ILLNESS:   This 82 y.o. female past medical history significant for thalassemia is seen for evaluation of heavy feeling that began 6 weeks ago. About 6 weeks ago she began with episodes of \"feeling heavy\" that would come on with no clear triggers or warning. She would feel as if her entire body was heavy. The episode lasted about 30 seconds and resolved. During these episodes she denied any confusion, visual change, dysarthria, dysphagia, or facial drooping. Over time these become more frequent. Now she may get 1 every other day. When she turns her head to the side she would \"feel drunk\". She denies any clear vertigo. On the day of admission she had an episode that lasted about 35 minutes. This morning she has no complaints to speak of. She recently moved from [place]. REVIEW OF SYSTEMS:  Constitutional - No fever or chills. No diaphoresis or significant fatigue. HENT -  No tinnitus or significant hearing loss. Eyes - no sudden vision change or eye pain  Respiratory - no significant shortness of breath or cough  Cardiovascular - no chest pain No palpitations or significant leg swelling  Gastrointestinal - no abdominal swelling or pain. Genitourinary - No difficulty urinating, dysuria  Musculoskeletal - no back pain or myalgia. Skin - no color change or rash  Neurologic - No seizures. No lateralizing weakness. Hematologic - no easy bruising or excessive bleeding. Psychiatric - no severe anxiety or nervousness. All other review of systems are negative.       Past

## 2023-10-26 NOTE — DISCHARGE SUMMARY
No intracranial large vessel occlusion or high-grade stenosis. Tortuous courses of the internal carotid arteries with distal luminal irregularity suggestive of collagen vascular disorders/fibromuscular dysplasia or less likely, atherosclerotic disease. No extracranial internal carotid or vertebral artery stenosis. All CT scans are performed using dose optimization techniques as appropriate to the performed exam and include at least one of the following: Automated exposure control, adjustment of the mA and/or kV according to size, and the use of iterative reconstruction technique. ______________________________________ Electronically signed by: Augustine Rubio D.O. Date:     10/26/2023 Time:    09:51     CT HEAD WO CONTRAST    Result Date: 10/25/2023  EXAM:  CT OF THE HEAD WITHOUT CONTRAST. HISTORY:  Fatigue. COMPARISON:  None available. TECHNIQUE:  Axial noncontrast CT of the head. Sagittal and coronal reformats were obtained. FINDINGS: No intracranial hemorrhage or mass effect is identified. There is moderate prominence of the sulci and mild prominence of the ventricles. Mild periventricular white matter hypodensities are identified. No gray white matter differentiation loss is seen to suggest an acute infarct. The calvarium is intact. The visualized paranasal sinuses are unopacified. No evidence of an acute intracranial process. Moderate atrophy and mild chronic small vessel ischemic changes. All CT scans are performed using dose optimization techniques as appropriate to the performed exam and include at least one of the following: Automated exposure control, adjustment of the mA and/or kV according to size, and the use of iterative reconstruction technique. ______________________________________ Electronically signed by: Matheus Rojas M.D.  Date:     10/25/2023 Time:    20:01       Pertinent Labs:   CBC:   Recent Labs     10/25/23  1927 10/26/23  0150   WBC 5.9 6.1   HGB 11.3* 10.0*    230

## 2023-10-26 NOTE — CARE COORDINATION
discharge: (P) N/A            Potential DME: none  Patient expects to discharge to: (P) [address] for transportation at discharge:      Financial    Payor: MEDICARE / Plan: MEDICARE PART A AND B / Product Type: *No Product type* /     Does insurance require precert for SNF: No    Potential assistance Purchasing Medications: (P) No  Meds-to-Beds request: no      [id number], [address] [address]  Phone: [phone] Fax: [phone]      Notes:    Factors facilitating achievement of predicted outcomes: Family support, Motivated, Cooperative, Pleasant, and Good insight into deficits    Barriers to discharge: Decreased endurance, Decreased sensation, Medical complications and TIA    Additional Case Management Note: CM met with patient at the bedside. Pt moved from [facility] to [facility]. Pt is school Psychologist. Pt had dtr in [place], and str coming from out of town and can assist her a few days. Pt is independent at baseline, no walking aide. Pt will be referred to outpt PT at discharge. The Plan for Transition of Care is related to the following treatment goals of TIA (transient ischemic attack) [G45.9]  Generalized weakness [N84.9]    IF APPLICABLE: The Patient and/or patient representative Dorian Arellano and her family were provided with a choice of provider and agrees with the discharge plan. Freedom of choice list with basic dialogue that supports the patient's individualized plan of care/goals and shares the quality data associated with the providers was provided to: (P) Patient     The Patient with the Discharge Plan?  (P) Yes return to home w/outpt PT referral    Lynnette Fenton RN  Case Management Department  Ph: [phone] Fax: [phone]  Electronically signed by Lynnette Fenton RN on 10/26/2023 at 5:16 PM

## 2023-10-27 ENCOUNTER — TELEPHONE (OUTPATIENT)
Dept: PRIMARY CARE CLINIC | Age: 83
End: 2023-10-27

## 2023-10-27 NOTE — TELEPHONE ENCOUNTER
Care Transitions Initial Follow Up Call    Outreach made within 2 business days of discharge: Yes    Patient: Ruma Simpson   Patient : 1940   MRN: [id number]    Reason for Admission:feeling heavy   Discharge Date: 10/26/23    DISCHARGE DIAGNOSES WITH COURSE OF MANAGEMENT :   Principal Problem:    Dizzy spells  Active Problems:    Hyperlipidemia    Migraine    Chronic rhinitis    Cognitive disorder    Type 2 diabetes mellitus    TIA (transient ischemic attack)    Imbalance    Weakness  Resolved Problems:    * No resolved hospital problems. *     Spoke with: I spoke Rohan Nguyen she stated she is tired from being in the hospital. She reports still feeling alittle heavy. Discharge department/facility: [address] [place] [address] Interactive Patient Contact:  Was patient able to fill all prescriptions: Yes  Was patient instructed to bring all medications to the follow-up visit: Yes  Is patient taking all medications as directed in the discharge summary?  Yes  Does patient understand their discharge instructions: Yes  Does patient have questions or concerns that need addressed prior to 7-14 day follow up office visit: no    Scheduled appointment with PCP within 7-14 days    Follow Up  Future Appointments   Date Time Provider [address] [place] [address]   2023  1:00 PM James Piedra, [address] [place] [address]   2023  1:00 PM James Piedra MD [id number]   2024 12:30 PM James Piedra, [address] [place] [address]

## 2023-10-28 LAB
EKG P AXIS: 8 DEGREES
EKG P-R INTERVAL: 170 MS
EKG Q-T INTERVAL: 426 MS
EKG QRS DURATION: 92 MS
EKG QTC CALCULATION (BAZETT): 426 MS
EKG T AXIS: 84 DEGREES

## 2023-10-31 ENCOUNTER — OFFICE VISIT (OUTPATIENT)
Age: 83
End: 2023-10-31
Payer: MEDICARE

## 2023-10-31 VITALS
SYSTOLIC BLOOD PRESSURE: 114 MMHG | DIASTOLIC BLOOD PRESSURE: 64 MMHG | HEART RATE: 77 BPM | BODY MASS INDEX: 24 KG/M2 | OXYGEN SATURATION: 96 % | RESPIRATION RATE: 18 BRPM | TEMPERATURE: 97.2 F | WEIGHT: 131.2 LBS

## 2023-10-31 DIAGNOSIS — M25.512 ACUTE PAIN OF LEFT SHOULDER: Primary | ICD-10-CM

## 2023-10-31 PROCEDURE — G8427 DOCREV CUR MEDS BY ELIG CLIN: HCPCS | Performed by: NURSE PRACTITIONER

## 2023-10-31 PROCEDURE — 1123F ACP DISCUSS/DSCN MKR DOCD: CPT | Performed by: NURSE PRACTITIONER

## 2023-10-31 PROCEDURE — 1036F TOBACCO NON-USER: CPT | Performed by: NURSE PRACTITIONER

## 2023-10-31 PROCEDURE — 1090F PRES/ABSN URINE INCON ASSESS: CPT | Performed by: NURSE PRACTITIONER

## 2023-10-31 PROCEDURE — G8484 FLU IMMUNIZE NO ADMIN: HCPCS | Performed by: NURSE PRACTITIONER

## 2023-10-31 PROCEDURE — 99213 OFFICE O/P EST LOW 20 MIN: CPT | Performed by: NURSE PRACTITIONER

## 2023-10-31 PROCEDURE — G8420 CALC BMI NORM PARAMETERS: HCPCS | Performed by: NURSE PRACTITIONER

## 2023-10-31 PROCEDURE — G8400 PT W/DXA NO RESULTS DOC: HCPCS | Performed by: NURSE PRACTITIONER

## 2023-10-31 ASSESSMENT — ENCOUNTER SYMPTOMS
BACK PAIN: 0
GASTROINTESTINAL NEGATIVE: 1
ALLERGIC/IMMUNOLOGIC NEGATIVE: 1
RESPIRATORY NEGATIVE: 1
EYES NEGATIVE: 1
SHORTNESS OF BREATH: 0

## 2023-10-31 NOTE — PROGRESS NOTES
[address] [place] [address]  Dept: [phone]  Dept Fax: [phone]  Loc: [phone]     Madeline Magallanes is a 82 y.o. female who presents today for her medical conditions/complaintsas noted below. Madeline Magallanes is c/o of pain below shoulder blade        HPI:     Shoulder Pain   The pain is present in the neck and left shoulder. The current episode started yesterday. The problem occurs intermittently. The problem has been unchanged. The pain is mild. Associated symptoms include a limited range of motion. Pertinent negatives include no fever, numbness, stiffness or tingling. The symptoms are aggravated by activity. Pt states she has been moving and has been lifting heavy objects.           Past Medical History:   Diagnosis Date    Heart murmur     HLD (hyperlipidemia)     Scoliosis     Thalassemia     Tobacco abuse, in remission     quit at age 31      Past Surgical History:   Procedure Laterality Date    LIVER BIOPSY N/A     OVARY REMOVAL Bilateral 2016    removed for suspicious cysts       Family History   Problem Relation Age of Onset    Other Mother          in MVA at age 27years old    Other Father         chose to die and stopped eating at age 80 years, it took 7 weeks to die    Scoliosis Father     Kidney Disease Sister         ESRD on HD    No Known Problems Sister     Cancer Maternal Grandmother     Cancer Maternal Grandfather     Cancer Paternal Grandmother     No Known Problems Paternal Grandfather     No Known Problems Daughter     Bipolar Disorder Daughter     No Known Problems Daughter     Bipolar Disorder Daughter        Social History     Tobacco Use    Smoking status: Former     Packs/day: 0.35     Years: 5.00     Additional pack years: 0.00     Total pack years: 1.75     Types: Cigarettes     Start date: 67     Quit date: 1970     Years since quittin.8    Smokeless tobacco: Never   Substance
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
## 2023-11-02 ENCOUNTER — OFFICE VISIT (OUTPATIENT)
Dept: PRIMARY CARE CLINIC | Age: 83
End: 2023-11-02

## 2023-11-02 VITALS
OXYGEN SATURATION: 96 % | HEART RATE: 58 BPM | SYSTOLIC BLOOD PRESSURE: 110 MMHG | DIASTOLIC BLOOD PRESSURE: 80 MMHG | HEIGHT: 62 IN | TEMPERATURE: 98.3 F | RESPIRATION RATE: 18 BRPM | BODY MASS INDEX: 23.37 KG/M2 | WEIGHT: 127 LBS
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
## 2023-11-02 DIAGNOSIS — R26.89 IMBALANCE: ICD-10-CM

## 2023-11-02 DIAGNOSIS — R42 DIZZINESS: ICD-10-CM

## 2023-11-02 DIAGNOSIS — Z09 HOSPITAL DISCHARGE FOLLOW-UP: ICD-10-CM

## 2023-11-02 DIAGNOSIS — Z23 FLU VACCINE NEED: Primary | ICD-10-CM

## 2023-11-02 RX ORDER — BETAMETHASONE DIPROPIONATE 0.05 %
OINTMENT (GRAM) TOPICAL
Qty: 15 G | Refills: 0 | Status: SHIPPED | OUTPATIENT
Start: 2023-11-02

## 2023-11-03 NOTE — ASSESSMENT & PLAN NOTE
Asymptomatic, lifestyle modifications recommended   Educated about the importance of hydration and over hydation   she was given educational materials for [employer]

## 2023-11-03 NOTE — TELEPHONE ENCOUNTER
Osiel Mcgill called to request a refill on her medication. Last office visit : 2023   Next office visit : 2023     Requested Prescriptions     Pending Prescriptions Disp Refills    Azelastine HCl 137 MCG/SPRAY SOLN [Pharmacy Med Name: AZELASTINE 0.1% (137 MCG) SPRY]  1     Si SPRAYS BY NASAL ROUTE 2 (TWO) TIMES DAILY.  USE IN EACH NOSTRIL AS DIRECTED            Marinus Homans, LPN

## 2023-11-05 RX ORDER — AZELASTINE HYDROCHLORIDE 137 UG/1
SPRAY, METERED NASAL
Qty: 1 EACH | Refills: 1 | Status: SHIPPED | OUTPATIENT
Start: 2023-11-05

## 2023-11-16 RX ORDER — MEMANTINE HYDROCHLORIDE 5 MG/1
TABLET ORAL
Qty: 270 TABLET | Refills: 1 | OUTPATIENT
Start: 2023-11-16

## 2023-11-16 NOTE — TELEPHONE ENCOUNTER
Madeline Magallanes called to request a refill on her medication. Last office visit : 11/2/2023   Next office visit : 12/11/2023     Requested Prescriptions     Pending Prescriptions Disp Refills    memantine (NAMENDA) 5 MG tablet [Pharmacy Med Name: MEMANTINE HCL 5 MG TABLET] 270 tablet 1     Sig: TAKE 5MG IN THE MORNING AND 10MG AT NIGHT.             Rudie Mortimer, LPN

## 2023-12-08 ENCOUNTER — HOSPITAL ENCOUNTER (OUTPATIENT)
Dept: MRI IMAGING | Age: 83
Discharge: HOME | End: 2023-12-08
Attending: PSYCHIATRY & NEUROLOGY
Payer: MEDICARE

## 2023-12-08 DIAGNOSIS — G45.9 TIA (TRANSIENT ISCHEMIC ATTACK): ICD-10-CM

## 2023-12-08 DIAGNOSIS — R42 DIZZINESS: ICD-10-CM

## 2023-12-08 PROCEDURE — 70551 MRI BRAIN STEM W/O DYE: CPT

## 2023-12-10 ASSESSMENT — ENCOUNTER SYMPTOMS
ABDOMINAL PAIN: 0
WHEEZING: 0
BACK PAIN: 1
BLOOD IN STOOL: 0
ABDOMINAL DISTENTION: 0
VOMITING: 0
CHEST TIGHTNESS: 0
NAUSEA: 0
CONSTIPATION: 0
COUGH: 0
APNEA: 0
DIARRHEA: 0
SINUS PAIN: 0
TROUBLE SWALLOWING: 0
SORE THROAT: 0
SHORTNESS OF BREATH: 0

## 2023-12-10 NOTE — PROGRESS NOTES
Razia Calles ( 1940) is a 82 y.o. female,  Established  here for evaluation of the following chief complaint(s).   6 Month Follow-Up (Requesting memantine 5 mg)      Patient was encouraged and advised to be compliant with all  medications leads an active lifestyle and promote maintaining a healthy weight, encouraged not to use cigarettes, laboratory results discussed and reviewed with patient's during this visit   ASSESSMENT/PLAN:  Problem List          Circulatory    TIA (transient ischemic attack)      Asymptomatic, continue current medications         Congenital pulmonary arteriovenous shunt      Asymptomatic, continue current medications            Endocrine    Type 2 diabetes mellitus      Well-controlled, continue current medications         Relevant Medications    metFORMIN (GLUCOPHAGE) 500 MG tablet       Nervous and Auditory    Cognitive disorder      Asymptomatic, continue current medications         Relevant Medications    memantine (NAMENDA) 10 MG tablet       Other    Hyperlipidemia      Well-controlled, lifestyle modifications recommended         Relevant Medications    ezetimibe (ZETIA) 10 MG tablet    propranolol (INDERAL) 20 MG tablet    atorvastatin (LIPITOR) 80 MG tablet            No data to display                    2023    12:52 PM 2022     2:57 PM   PHQ Scores   PHQ2 Score 1 0   PHQ9 Score 1 0       Results for orders placed or performed during the hospital encounter of 10/25/23   CBC with Auto Differential   Result Value Ref Range    WBC 5.9 4.8 - 10.8 K/uL    RBC 4.74 4.20 - 5.40 M/uL    Hemoglobin 11.3 (L) 12.0 - 16.0 g/dL    Hematocrit 34.5 (L) 37.0 - 47.0 %    MCV 72.8 (L) 81.0 - 99.0 fL    MCH 23.8 (L) 27.0 - 31.0 pg    MCHC 32.8 (L) 33.0 - 37.0 g/dL    RDW 15.4 (H) 11.5 - 14.5 %    Platelets 207 330 - 390 K/uL    MPV 10.2 9.4 - 12.3 fL    Neutrophils % 52.8 50.0 - 65.0 %    Lymphocytes % 36.0 20.0 - 40.0 %    Monocytes % 7.7 0.0 - 10.0 %    Eosinophils % 2.5 0.0 - 5.0

## 2023-12-11 ENCOUNTER — OFFICE VISIT (OUTPATIENT)
Dept: PRIMARY CARE CLINIC | Age: 83
End: 2023-12-11
Payer: MEDICARE

## 2023-12-11 ENCOUNTER — TELEPHONE (OUTPATIENT)
Dept: PRIMARY CARE CLINIC | Age: 83
End: 2023-12-11

## 2023-12-11 VITALS
BODY MASS INDEX: 24.11 KG/M2 | RESPIRATION RATE: 20 BRPM | DIASTOLIC BLOOD PRESSURE: 80 MMHG | TEMPERATURE: 97.4 F | SYSTOLIC BLOOD PRESSURE: 116 MMHG | HEART RATE: 71 BPM | OXYGEN SATURATION: 93 % | HEIGHT: 62 IN | WEIGHT: 131 LBS

## 2023-12-11 DIAGNOSIS — E78.2 MIXED HYPERLIPIDEMIA: ICD-10-CM

## 2023-12-11 DIAGNOSIS — E11.69 TYPE 2 DIABETES MELLITUS WITH OTHER SPECIFIED COMPLICATION, UNSPECIFIED WHETHER LONG TERM INSULIN USE (HCC): ICD-10-CM

## 2023-12-11 DIAGNOSIS — Q25.72 CONGENITAL PULMONARY ARTERIOVENOUS SHUNT: ICD-10-CM

## 2023-12-11 DIAGNOSIS — G45.9 TIA (TRANSIENT ISCHEMIC ATTACK): ICD-10-CM

## 2023-12-11 DIAGNOSIS — F09 COGNITIVE DISORDER: ICD-10-CM

## 2023-12-11 PROCEDURE — 99214 OFFICE O/P EST MOD 30 MIN: CPT | Performed by: INTERNAL MEDICINE

## 2023-12-11 PROCEDURE — 1123F ACP DISCUSS/DSCN MKR DOCD: CPT | Performed by: INTERNAL MEDICINE

## 2023-12-11 PROCEDURE — G8420 CALC BMI NORM PARAMETERS: HCPCS | Performed by: INTERNAL MEDICINE

## 2023-12-11 PROCEDURE — 3044F HG A1C LEVEL LT 7.0%: CPT | Performed by: INTERNAL MEDICINE

## 2023-12-11 PROCEDURE — G8484 FLU IMMUNIZE NO ADMIN: HCPCS | Performed by: INTERNAL MEDICINE

## 2023-12-11 PROCEDURE — G8400 PT W/DXA NO RESULTS DOC: HCPCS | Performed by: INTERNAL MEDICINE

## 2023-12-11 PROCEDURE — 1090F PRES/ABSN URINE INCON ASSESS: CPT | Performed by: INTERNAL MEDICINE

## 2023-12-11 PROCEDURE — 1036F TOBACCO NON-USER: CPT | Performed by: INTERNAL MEDICINE

## 2023-12-11 PROCEDURE — G8427 DOCREV CUR MEDS BY ELIG CLIN: HCPCS | Performed by: INTERNAL MEDICINE

## 2023-12-11 NOTE — TELEPHONE ENCOUNTER
----- Message from Bettie Porras MD sent at 12/11/2023  1:45 PM CST -----  Regarding: regular flu vaccine  Patient requesting regular Flu to pharmacy, [facility]

## 2023-12-15 ENCOUNTER — TELEPHONE (OUTPATIENT)
Dept: PRIMARY CARE CLINIC | Age: 83
End: 2023-12-15

## 2023-12-15 DIAGNOSIS — R11.0 NAUSEA: Primary | ICD-10-CM

## 2023-12-15 RX ORDER — ONDANSETRON 4 MG/1
4 TABLET, FILM COATED ORAL 3 TIMES DAILY PRN
Qty: 9 TABLET | Refills: 0 | Status: SHIPPED | OUTPATIENT
Start: 2023-12-15 | End: 2023-12-18

## 2024-02-29 RX ORDER — ESTRADIOL 0.1 MG/G
1 CREAM VAGINAL
Qty: 42.5 G | Refills: 1 | Status: SHIPPED | OUTPATIENT
Start: 2024-02-29 | End: 2024-12-23

## 2024-02-29 NOTE — TELEPHONE ENCOUNTER
Paulette Sutton called to request a refill on her medication.      Last office visit : 12/11/2023   Next office visit : 6/11/2024     Requested Prescriptions     Pending Prescriptions Disp Refills    estradiol (ESTRACE) 0.1 MG/GM vaginal cream       Sig: Place 2 g vaginally Twice a Week       We have never filled before. Please advise.      Manasa Cochran LPN

## 2024-03-20 ENCOUNTER — TELEPHONE (OUTPATIENT)
Dept: PRIMARY CARE CLINIC | Age: 84
End: 2024-03-20

## 2024-03-20 NOTE — TELEPHONE ENCOUNTER
Patient called and left a voicemail requesting the contact info and address of the female dermatologist that Dr Vasquez referred her to. Referral for dermatology in patient's chart is for Lexa Medellin. Called patient and left a voicemail informing patient of this and instructing her to call the office with any further questions.

## 2024-03-25 RX ORDER — AZELASTINE HYDROCHLORIDE 137 UG/1
SPRAY, METERED NASAL
Qty: 1 EACH | Refills: 1 | Status: SHIPPED | OUTPATIENT
Start: 2024-03-25

## 2024-03-25 NOTE — TELEPHONE ENCOUNTER
Paulette Sutton called to request a refill on her medication.      Last office visit : 2023   Next office visit : 2024     Requested Prescriptions     Pending Prescriptions Disp Refills    estradiol (CLIMARA) 0.025 MG/24HR [Pharmacy Med Name: ESTRADIOL 0.025 MG PATCH(1/WK)] 4 patch 3     Sig: PLACE 1 PATCH ONTO THE SKIN EVERY 7 DAYS.    Azelastine HCl 137 MCG/SPRAY SOLN [Pharmacy Med Name: AZELASTINE 0.1% (137 MCG) SPRY]  1     Si SPRAYS BY NASAL ROUTE 2 (TWO) TIMES DAILY. USE IN EACH NOSTRIL AS DIRECTED            Manasa Cochran LPN

## 2024-03-25 NOTE — TELEPHONE ENCOUNTER
Paulette Sutton called to request a refill on her medication.      Last office visit : 12/11/2023   Next office visit : 6/11/2024     Requested Prescriptions     Pending Prescriptions Disp Refills    estradiol (CLIMARA) 0.025 MG/24HR [Pharmacy Med Name: ESTRADIOL 0.025 MG PATCH(1/WK)] 4 patch 3     Sig: PLACE 1 PATCH ONTO THE SKIN EVERY 7 DAYS.            Manasa Cochran LPN

## 2024-04-15 ENCOUNTER — TRANSCRIBE ORDERS (OUTPATIENT)
Dept: ADMINISTRATIVE | Facility: HOSPITAL | Age: 84
End: 2024-04-15
Payer: MEDICARE

## 2024-04-15 DIAGNOSIS — M46.1 OSTEOARTHRITIS OF SACROILIAC JOINT: Primary | ICD-10-CM

## 2024-04-15 DIAGNOSIS — R73.03 PREDIABETES: ICD-10-CM

## 2024-04-15 DIAGNOSIS — M47.816 LUMBAR SPONDYLOSIS: ICD-10-CM

## 2024-04-15 NOTE — TELEPHONE ENCOUNTER
Paulette Sutton called to request a refill on her medication.      Last office visit : 12/11/2023   Next office visit : 6/11/2024     Requested Prescriptions     Pending Prescriptions Disp Refills    metFORMIN (GLUCOPHAGE) 500 MG tablet [Pharmacy Med Name: METFORMIN  MG TABLET] 90 tablet 1     Sig: TAKE 1 TABLET BY MOUTH EVERY DAY WITH BREAKFAST            Manasa Cochran LPN

## 2024-04-25 ENCOUNTER — OFFICE VISIT (OUTPATIENT)
Dept: OBSTETRICS AND GYNECOLOGY | Age: 84
End: 2024-04-25
Payer: MEDICARE

## 2024-04-25 VITALS
DIASTOLIC BLOOD PRESSURE: 66 MMHG | BODY MASS INDEX: 22.82 KG/M2 | HEIGHT: 62 IN | WEIGHT: 124 LBS | SYSTOLIC BLOOD PRESSURE: 108 MMHG

## 2024-04-25 DIAGNOSIS — Z01.419 ENCOUNTER FOR GYNECOLOGICAL EXAMINATION WITHOUT ABNORMAL FINDING: Primary | ICD-10-CM

## 2024-04-25 DIAGNOSIS — Z13.820 OSTEOPOROSIS SCREENING: ICD-10-CM

## 2024-04-25 DIAGNOSIS — Z78.0 POST-MENOPAUSAL: ICD-10-CM

## 2024-04-25 DIAGNOSIS — Z12.31 ENCOUNTER FOR SCREENING MAMMOGRAM FOR MALIGNANT NEOPLASM OF BREAST: ICD-10-CM

## 2024-04-25 DIAGNOSIS — N95.0 POST-MENOPAUSAL BLEEDING: ICD-10-CM

## 2024-04-25 RX ORDER — ESOMEPRAZOLE MAGNESIUM 20 MG/1
20 GRANULE, DELAYED RELEASE ORAL
COMMUNITY

## 2024-04-25 RX ORDER — AZELASTINE HYDROCHLORIDE 137 UG/1
SPRAY, METERED NASAL
COMMUNITY

## 2024-04-25 RX ORDER — PREDNISONE 5 MG/1
TABLET ORAL
COMMUNITY
Start: 2024-04-15

## 2024-04-25 RX ORDER — PROGESTERONE 100 MG/1
CAPSULE ORAL
COMMUNITY
Start: 2024-03-25

## 2024-04-25 RX ORDER — TERIPARATIDE 250 UG/ML
20 INJECTION, SOLUTION SUBCUTANEOUS DAILY
COMMUNITY

## 2024-04-25 RX ORDER — LEVOCETIRIZINE DIHYDROCHLORIDE 5 MG/1
5 TABLET, FILM COATED ORAL
COMMUNITY

## 2024-04-25 RX ORDER — LOPERAMIDE HYDROCHLORIDE 2 MG/1
1 TABLET ORAL
COMMUNITY

## 2024-04-25 RX ORDER — MELATONIN
1000
COMMUNITY

## 2024-04-25 RX ORDER — PROPRANOLOL HYDROCHLORIDE 20 MG/1
1 TABLET ORAL DAILY
COMMUNITY
Start: 2024-03-25

## 2024-04-25 RX ORDER — ESTRADIOL 0.1 MG/G
CREAM VAGINAL
COMMUNITY

## 2024-04-25 RX ORDER — IBUPROFEN 200 MG
1900 CAPSULE ORAL
COMMUNITY

## 2024-04-25 RX ORDER — MEMANTINE HYDROCHLORIDE 10 MG/1
TABLET ORAL
COMMUNITY
Start: 2024-01-31

## 2024-04-25 RX ORDER — TRAMADOL HYDROCHLORIDE 50 MG/1
50 TABLET ORAL EVERY 6 HOURS PRN
COMMUNITY

## 2024-04-25 RX ORDER — UBIDECARENONE 100 MG
1 CAPSULE ORAL DAILY
COMMUNITY

## 2024-04-25 RX ORDER — LIOTHYRONINE SODIUM 5 UG/1
TABLET ORAL
COMMUNITY
Start: 2024-04-11

## 2024-04-25 RX ORDER — ALBUTEROL SULFATE 2.5 MG/3ML
2.5 SOLUTION RESPIRATORY (INHALATION)
COMMUNITY

## 2024-04-25 NOTE — PATIENT INSTRUCTIONS

## 2024-04-25 NOTE — PROGRESS NOTES
Chief Complaint  PMB (New patient here with c/o postmenopausal bleeding that started about a month after pt started back on Progesterone(prescribed by [facility]).  /Last pap unknown, has had abnormals in the past.  /Last mammogram 10/3/22([facility]) benign /)    Subjective          Ricarda Hatch presents to [facility]  History of Present Illness  The patient is an 83-year-old female who presents for evaluation of postmenopausal bleeding.    The patient has been utilizing hormonal replacement therapy for the past 30 years.   She was previously on Mengestrol for 30 years, but due to financial constraints, she transitioned to Climara patches.   She discontinued progesterone for 6 years, but resumed it 4 months ago, the cause of stopping remains unknown to her.     Approximately a month after restarting progesterone, she began experiencing spotting, with two additional episodes since.    She uses climara patches (changing weekly) and progesterone (daily).   Estrace vaginal cream twice weekly.  She reports she has been prescribed testosterone to start soon.   She denies experiencing hot flashes or night sweats.   She experienced vaginal dryness 8 years ago, and then started Estrace.   She denies any history of myocardial infarction, cerebrovascular accident, or thromboembolic events.   She takes Namenda for migraine management.     Her last mammogram conducted in 10/2023 at her previous facility.   Her last DEXA scan was conducted on 10/05/2023, which showed an increased density.   She has a history of hematuria, but none recently.   She has a history of urinary tract infections (UTIs).   Her last colonoscopy was approximately 4 to 5 years ago.      Review of Systems   Constitutional:  Negative for activity change, appetite change, fatigue and fever.   HENT:  Negative for congestion, sore throat and trouble swallowing.    Eyes:  Negative for pain, discharge and visual disturbance.  "  Respiratory:  Negative for apnea, shortness of breath and wheezing.    Cardiovascular:  Negative for chest pain, palpitations and leg swelling.   Gastrointestinal:  Negative for abdominal pain, constipation and diarrhea.   Genitourinary:  Negative for frequency, pelvic pain, urgency and vaginal discharge. Vaginal bleeding: 3 episodes of spotting, several weeks apart.  Musculoskeletal:  Negative for back pain and gait problem.   Skin:  Negative for color change and rash.   Neurological:  Negative for dizziness, weakness and numbness.   Psychiatric/Behavioral:  Negative for confusion and sleep disturbance.         Objective   Vital Signs:   /66   Ht 157.5 cm (62\")   Wt 56.2 kg (124 lb)   BMI 22.68 kg/m²     Physical Exam  Vitals and nursing note reviewed. Exam conducted with a chaperone present.   Constitutional:       General: She is not in acute distress.     Appearance: She is well-developed. She is not diaphoretic.   HENT:      Head: Normocephalic.      Right Ear: External ear normal.      Left Ear: External ear normal.      Nose: Nose normal.   Eyes:      General: No scleral icterus.        Right eye: No discharge.         Left eye: No discharge.      Conjunctiva/sclera: Conjunctivae normal.      Pupils: Pupils are equal, round, and reactive to light.   Neck:      Thyroid: No thyromegaly.      Vascular: No carotid bruit.      Trachea: No tracheal deviation.   Cardiovascular:      Rate and Rhythm: Normal rate and regular rhythm.      Heart sounds: Normal heart sounds. No murmur heard.  Pulmonary:      Effort: Pulmonary effort is normal. No respiratory distress.      Breath sounds: Normal breath sounds. No wheezing.   Chest:   Breasts:     Breasts are symmetrical.      Right: Normal. No swelling, bleeding, inverted nipple, mass, nipple discharge, skin change or tenderness.      Left: Normal. No swelling, bleeding, inverted nipple, mass, nipple discharge, skin change or tenderness.      Comments: " Bilateral breast reduction scars.   Abdominal:      General: There is no distension.      Palpations: Abdomen is soft. There is no mass.      Tenderness: There is no abdominal tenderness. There is no right CVA tenderness, left CVA tenderness or guarding.      Hernia: No hernia is present. There is no hernia in the left inguinal area or right inguinal area.   Genitourinary:     General: Normal vulva.      Exam position: Lithotomy position.      Labia:         Right: No rash, tenderness, lesion or injury.         Left: No rash, tenderness, lesion or injury.       Vagina: Normal. No signs of injury and foreign body. No vaginal discharge, erythema, tenderness or bleeding.      Cervix: Normal.      Uterus: Normal. Not enlarged, not fixed and not tender.       Adnexa: Right adnexa normal and left adnexa normal.        Right: No mass, tenderness or fullness.          Left: No mass, tenderness or fullness.        Rectum: Normal. No mass.      Comments:   BSU normal  Urethral meatus  Normal  Perineum  Normal  Musculoskeletal:         General: No tenderness. Normal range of motion.      Cervical back: Normal range of motion and neck supple.   Lymphadenopathy:      Head:      Right side of head: No submental, submandibular, tonsillar, preauricular, posterior auricular or occipital adenopathy.      Left side of head: No submental, submandibular, tonsillar, preauricular, posterior auricular or occipital adenopathy.      Cervical: No cervical adenopathy.      Right cervical: No superficial, deep or posterior cervical adenopathy.     Left cervical: No superficial, deep or posterior cervical adenopathy.      Upper Body:      Right upper body: No supraclavicular, axillary or pectoral adenopathy.      Left upper body: No supraclavicular, axillary or pectoral adenopathy.      Lower Body: No right inguinal adenopathy. No left inguinal adenopathy.   Skin:     General: Skin is warm and dry.      Findings: No bruising, erythema or rash.    Neurological:      Mental Status: She is alert and oriented to person, place, and time.      Coordination: Coordination normal.   Psychiatric:         Mood and Affect: Mood normal.         Behavior: Behavior normal.         Thought Content: Thought content normal.         Judgment: Judgment normal.         Result Review :   The following data was reviewed by: GALINDO Martinez on 04/25/2024:    Data reviewed : Radiologic studies  transvaginal US    Impression:     1.  Uterus: Normal size and Anteverted     2.  Endometrium:  1.6 mm      3.  Myometrium: Normal homogenous texture  and 1 cm echogenic myometrial lesion likely a fibroid     4.  Ovaries  Left:    Not visualized  and Surgically absent  Right:  Not visualized  and Surgically absent        Relevant comparison data: No relevant comparison data     Shayan Rowe MD  4/25/2024 10:03 CDT         Current Outpatient Medications on File Prior to Visit   Medication Sig    Azelastine HCl 137 MCG/SPRAY solution 2 SPRAYS BY NASAL ROUTE 2 (TWO) TIMES DAILY. USE IN EACH NOSTRIL AS DIRECTED    calcium citrate (CALCITRATE) 950 (200 Ca) MG tablet Take 2 tablets by mouth.    Cholecalciferol 25 MCG (1000 UT) tablet Take 1 tablet by mouth.    coenzyme Q10 100 MG capsule Take 1 capsule by mouth Daily.    esomeprazole (NexIUM) 20 MG packet Take 20 mg by mouth.    estradiol (CLIMARA) 0.025 MG/24HR patch PLACE 1 PATCH ONTO THE SKIN EVERY 7 DAYS.    estradiol (ESTRACE) 0.1 MG/GM vaginal cream PLACE 1 GRAM VAGINALLY TWICE A WEEK    levocetirizine (XYZAL) 5 MG tablet Take 1 tablet by mouth.    liothyronine (CYTOMEL) 5 MCG tablet TAKE 1 TABLET EVERY DAY BY ORAL ROUTE BEFORE MEAL(S).    loperamide (IMODIUM A-D) 2 MG tablet Take 0.5 tablets by mouth.    memantine (NAMENDA) 10 MG tablet     metFORMIN (GLUCOPHAGE) 500 MG tablet Take 1 tablet by mouth Daily With Breakfast.    PEPPERMINT OIL PO Take 1 capsule by mouth 2 (Two) Times a Day.    Progesterone (PROMETRIUM) 100 MG  capsule TAKE 1 CAPSULE BY MOUTH EVERY DAY AT BEDTIME FOR 30 DAYS    propranolol (INDERAL) 20 MG tablet Take 1 tablet by mouth Daily.    traMADol (ULTRAM) 50 MG tablet Take 1 tablet by mouth Every 6 (Six) Hours As Needed for Moderate Pain.    albuterol (PROVENTIL) (2.5 MG/3ML) 0.083% nebulizer solution Inhale 2.5 mg. (Patient not taking: Reported on 4/25/2024)    Alpha-Lipoic Acid 100 MG tablet Take 200 mg by mouth. (Patient not taking: Reported on 4/25/2024)    Apoaequorin 10 MG capsule Take 70 mg by mouth. (Patient not taking: Reported on 4/25/2024)    Apoaequorin 20 MG capsule Take 70 mg by mouth Daily. (Patient not taking: Reported on 4/25/2024)    Lysine 500 MG capsule Take  by mouth Daily. (Patient not taking: Reported on 4/25/2024)    predniSONE (DELTASONE) 5 MG tablet Please see attached for detailed directions (Patient not taking: Reported on 4/25/2024)    Teriparatide, Recombinant, (FORTEO) 600 MCG/2.4ML injection Inject 0.08 mL under the skin into the appropriate area as directed Daily. (Patient not taking: Reported on 4/25/2024)     No current facility-administered medications on file prior to visit.          Assessment and Plan      Well woman GYN exam.   Pap smear done per ASCCP guidelines.   Pelvic exam with chaperone present.     Will have lab work at PCP.     Colonoscopy is up to date.    Bone density ordered.     Discussed STD prevention and testing.   Pt declines Chlamydia/Gonorrhea/Trichomonas, RPR, Hep panel and HIV testing.     Mammogram will be scheduled at [facility].     Educational material provided related to breast self awareness, exercising to stay healthy, calcium/vitamin D, and Kegel exercises.     The patient has been strongly advised against continuation of hormone replacement therapy r/t cardiovascular risk factors.    Pt voiced understanding.     Discussed pt case with Dr. Craig.   Will have pt return for repeat US r/t minimal endometrial thickness but the need for follow up.   Pt  advised to call with any questions or concerns.   Discussed S&S to report. Pt voiced understanding.         Diagnoses and all orders for this visit:    1. Encounter for gynecological examination without abnormal finding (Primary)    2. Encounter for screening mammogram for malignant neoplasm of breast  -     Mammo Screening Digital Tomosynthesis Bilateral With CAD; Future    3. Osteoporosis screening  -     DEXA Bone Density Axial; Future    4. Post-menopausal  -     DEXA Bone Density Axial; Future    5. Post-menopausal bleeding          BMI is within normal parameters. No other follow-up for BMI required.       Follow Up   Return in 3 months (on 7/25/2024) for US and OV.    Patient was given instructions and counseling regarding her condition or for health maintenance advice. Please see specific information pulled into the AVS if appropriate.       Transcribed from ambient dictation for GALINDO Martinez by Dragan Small.  04/25/24   11:28 CDT    Patient or patient representative verbalized consent to the visit recording.  I have personally performed the services described in this document as transcribed by the above individual, and it is both accurate and complete.  GALINDO Martinez  4/25/2024  21:45 CDT

## 2024-05-08 RX ORDER — AZELASTINE HYDROCHLORIDE 137 UG/1
SPRAY, METERED NASAL
Qty: 3 EACH | Refills: 1 | Status: SHIPPED | OUTPATIENT
Start: 2024-05-08

## 2024-05-08 NOTE — TELEPHONE ENCOUNTER
Paulette Sutton called to request a refill on her medication.      Last office visit : 2023   Next office visit : 2024     Requested Prescriptions     Pending Prescriptions Disp Refills    Azelastine HCl 137 MCG/SPRAY SOLN [Pharmacy Med Name: AZELASTINE 0.1% (137 MCG) SPRY]  1     Si SPRAYS BY NASAL ROUTE 2 (TWO) TIMES DAILY. USE IN EACH NOSTRIL AS DIRECTED            Manasa Cochran LPN

## 2024-05-15 ENCOUNTER — HOSPITAL ENCOUNTER (OUTPATIENT)
Dept: MRI IMAGING | Facility: HOSPITAL | Age: 84
Discharge: HOME | End: 2024-05-15
Payer: MEDICARE

## 2024-05-16 ENCOUNTER — APPOINTMENT (OUTPATIENT)
Dept: BONE DENSITY | Facility: HOSPITAL | Age: 84
End: 2024-05-16
Payer: MEDICARE

## 2024-05-16 ENCOUNTER — HOSPITAL ENCOUNTER (OUTPATIENT)
Dept: MAMMOGRAPHY | Facility: HOSPITAL | Age: 84
Discharge: HOME | End: 2024-05-16
Admitting: NURSE PRACTITIONER
Payer: MEDICARE

## 2024-05-16 DIAGNOSIS — Z12.31 ENCOUNTER FOR SCREENING MAMMOGRAM FOR MALIGNANT NEOPLASM OF BREAST: ICD-10-CM

## 2024-05-16 PROCEDURE — 77067 SCR MAMMO BI INCL CAD: CPT

## 2024-05-16 PROCEDURE — 77063 BREAST TOMOSYNTHESIS BI: CPT

## 2024-05-19 ENCOUNTER — OFFICE VISIT (OUTPATIENT)
Age: 84
End: 2024-05-19
Payer: MEDICARE

## 2024-05-19 VITALS
SYSTOLIC BLOOD PRESSURE: 114 MMHG | TEMPERATURE: 99.2 F | OXYGEN SATURATION: 95 % | RESPIRATION RATE: 20 BRPM | BODY MASS INDEX: 23.92 KG/M2 | HEIGHT: 62 IN | HEART RATE: 110 BPM | DIASTOLIC BLOOD PRESSURE: 72 MMHG | WEIGHT: 130 LBS

## 2024-05-19 DIAGNOSIS — J40 BRONCHITIS: Primary | ICD-10-CM

## 2024-05-19 PROCEDURE — G8420 CALC BMI NORM PARAMETERS: HCPCS | Performed by: NURSE PRACTITIONER

## 2024-05-19 PROCEDURE — 1090F PRES/ABSN URINE INCON ASSESS: CPT | Performed by: NURSE PRACTITIONER

## 2024-05-19 PROCEDURE — 1036F TOBACCO NON-USER: CPT | Performed by: NURSE PRACTITIONER

## 2024-05-19 PROCEDURE — 1123F ACP DISCUSS/DSCN MKR DOCD: CPT | Performed by: NURSE PRACTITIONER

## 2024-05-19 PROCEDURE — G8427 DOCREV CUR MEDS BY ELIG CLIN: HCPCS | Performed by: NURSE PRACTITIONER

## 2024-05-19 PROCEDURE — 99213 OFFICE O/P EST LOW 20 MIN: CPT | Performed by: NURSE PRACTITIONER

## 2024-05-19 PROCEDURE — G8400 PT W/DXA NO RESULTS DOC: HCPCS | Performed by: NURSE PRACTITIONER

## 2024-05-19 RX ORDER — BENZONATATE 100 MG/1
100 CAPSULE ORAL 3 TIMES DAILY PRN
Qty: 30 CAPSULE | Refills: 0 | Status: SHIPPED | OUTPATIENT
Start: 2024-05-19 | End: 2024-05-29

## 2024-05-19 RX ORDER — AZITHROMYCIN 250 MG/1
TABLET, FILM COATED ORAL
Qty: 6 TABLET | Refills: 0 | Status: SHIPPED | OUTPATIENT
Start: 2024-05-19 | End: 2024-05-29

## 2024-06-06 ENCOUNTER — HOSPITAL ENCOUNTER (OUTPATIENT)
Dept: MRI IMAGING | Facility: HOSPITAL | Age: 84
Discharge: HOME | End: 2024-06-06
Payer: MEDICARE

## 2024-06-06 DIAGNOSIS — M46.1 OSTEOARTHRITIS OF SACROILIAC JOINT: ICD-10-CM

## 2024-06-06 DIAGNOSIS — M47.816 LUMBAR SPONDYLOSIS: ICD-10-CM

## 2024-06-06 PROCEDURE — 72148 MRI LUMBAR SPINE W/O DYE: CPT

## 2024-06-10 NOTE — PROGRESS NOTES
Paulette Sutton ( 1940) is a 83 y.o. female, Established, here for evaluation of the following chief complaint(s).  Medicare AWV and 6 Month Follow-Up      Patient was encouraged and advised to be compliant with all  medications leads an active lifestyle and promote maintaining a healthy weight, encouraged not to use cigarettes, laboratory results discussed and reviewed with patient's during this visit   ASSESSMENT/PLAN:  Problem List          Circulatory    TIA (transient ischemic attack)      Asymptomatic, continue current medications         Relevant Orders    High sensitivity CRP       Respiratory    Allergic rhinitis      Well-controlled, continue current medications         Relevant Medications    levocetirizine (XYZAL) 5 MG tablet    Azelastine HCl 137 MCG/SPRAY SOLN    Hiatal hernia with gastroesophageal reflux      Well-controlled, continue current medications         Relevant Medications    loperamide (IMODIUM A-D) 2 MG tablet    esomeprazole Magnesium (NEXIUM) 20 MG PACK       Nervous and Auditory    Alzheimer's disease, unspecified      Monitored by specialist- no acute findings meriting change in the plan         Relevant Medications    memantine (NAMENDA) 10 MG tablet       Musculoskeletal and Integument    Age-related osteoporosis without current pathological fracture      Uncontrolled, lifestyle modifications recommended            Other    Hyponatremia    Relevant Orders    Comprehensive Metabolic Panel    Hyperlipidemia      Asymptomatic, lifestyle modifications recommended         Relevant Medications    ezetimibe (ZETIA) 10 MG tablet    propranolol (INDERAL) 20 MG tablet    atorvastatin (LIPITOR) 80 MG tablet    Other Relevant Orders    Lipid Panel              No data to display                    2024    12:50 PM 2023    12:52 PM 2022     2:57 PM   PHQ Scores   PHQ2 Score 0 1 0   PHQ9 Score 0 1 0       Results for orders placed or performed during the hospital encounter of

## 2024-06-11 ENCOUNTER — OFFICE VISIT (OUTPATIENT)
Dept: PRIMARY CARE CLINIC | Age: 84
End: 2024-06-11

## 2024-06-11 VITALS
DIASTOLIC BLOOD PRESSURE: 68 MMHG | SYSTOLIC BLOOD PRESSURE: 100 MMHG | OXYGEN SATURATION: 97 % | BODY MASS INDEX: 23.55 KG/M2 | RESPIRATION RATE: 16 BRPM | HEART RATE: 68 BPM | TEMPERATURE: 98 F | HEIGHT: 62 IN | WEIGHT: 128 LBS

## 2024-06-11 DIAGNOSIS — R73.03 PREDIABETES: ICD-10-CM

## 2024-06-11 DIAGNOSIS — R26.81 UNSTEADY GAIT: ICD-10-CM

## 2024-06-11 DIAGNOSIS — Z29.11 NEED FOR PROPHYLACTIC VACCINATION AND INOCULATION AGAINST RESPIRATORY SYNCYTIAL VIRUS (RSV): ICD-10-CM

## 2024-06-11 DIAGNOSIS — Z13.0 SCREENING FOR DEFICIENCY ANEMIA: Primary | ICD-10-CM

## 2024-06-11 DIAGNOSIS — J30.1 NON-SEASONAL ALLERGIC RHINITIS DUE TO POLLEN: ICD-10-CM

## 2024-06-11 DIAGNOSIS — Z13.6 ENCOUNTER FOR SCREENING FOR CARDIOVASCULAR DISORDERS: ICD-10-CM

## 2024-06-11 DIAGNOSIS — M81.0 AGE-RELATED OSTEOPOROSIS WITHOUT CURRENT PATHOLOGICAL FRACTURE: ICD-10-CM

## 2024-06-11 DIAGNOSIS — E87.1 HYPONATREMIA: ICD-10-CM

## 2024-06-11 DIAGNOSIS — K21.9 HIATAL HERNIA WITH GASTROESOPHAGEAL REFLUX: ICD-10-CM

## 2024-06-11 DIAGNOSIS — G45.9 TIA (TRANSIENT ISCHEMIC ATTACK): ICD-10-CM

## 2024-06-11 DIAGNOSIS — Z12.11 SCREEN FOR COLON CANCER: ICD-10-CM

## 2024-06-11 DIAGNOSIS — E78.2 MIXED HYPERLIPIDEMIA: ICD-10-CM

## 2024-06-11 DIAGNOSIS — K44.9 HIATAL HERNIA WITH GASTROESOPHAGEAL REFLUX: ICD-10-CM

## 2024-06-11 DIAGNOSIS — Z00.00 MEDICARE ANNUAL WELLNESS VISIT, SUBSEQUENT: ICD-10-CM

## 2024-06-11 DIAGNOSIS — G30.9 ALZHEIMER'S DISEASE, UNSPECIFIED (CODE) (HCC): ICD-10-CM

## 2024-06-11 PROBLEM — E11.9 TYPE 2 DIABETES MELLITUS (HCC): Status: RESOLVED | Noted: 2023-08-20 | Resolved: 2024-06-11

## 2024-06-11 PROCEDURE — 1123F ACP DISCUSS/DSCN MKR DOCD: CPT | Performed by: INTERNAL MEDICINE

## 2024-06-11 PROCEDURE — G0439 PPPS, SUBSEQ VISIT: HCPCS | Performed by: INTERNAL MEDICINE

## 2024-06-11 SDOH — ECONOMIC STABILITY: FOOD INSECURITY: WITHIN THE PAST 12 MONTHS, YOU WORRIED THAT YOUR FOOD WOULD RUN OUT BEFORE YOU GOT MONEY TO BUY MORE.: NEVER TRUE

## 2024-06-11 SDOH — ECONOMIC STABILITY: FOOD INSECURITY: WITHIN THE PAST 12 MONTHS, THE FOOD YOU BOUGHT JUST DIDN'T LAST AND YOU DIDN'T HAVE MONEY TO GET MORE.: NEVER TRUE

## 2024-06-11 SDOH — ECONOMIC STABILITY: INCOME INSECURITY: HOW HARD IS IT FOR YOU TO PAY FOR THE VERY BASICS LIKE FOOD, HOUSING, MEDICAL CARE, AND HEATING?: NOT HARD AT ALL

## 2024-06-11 ASSESSMENT — PATIENT HEALTH QUESTIONNAIRE - PHQ9
SUM OF ALL RESPONSES TO PHQ QUESTIONS 1-9: 0
2. FEELING DOWN, DEPRESSED OR HOPELESS: NOT AT ALL
SUM OF ALL RESPONSES TO PHQ QUESTIONS 1-9: 0
SUM OF ALL RESPONSES TO PHQ QUESTIONS 1-9: 0
SUM OF ALL RESPONSES TO PHQ9 QUESTIONS 1 & 2: 0
1. LITTLE INTEREST OR PLEASURE IN DOING THINGS: NOT AT ALL
SUM OF ALL RESPONSES TO PHQ QUESTIONS 1-9: 0

## 2024-06-11 ASSESSMENT — ENCOUNTER SYMPTOMS
TROUBLE SWALLOWING: 0
WHEEZING: 0
NAUSEA: 0
ABDOMINAL DISTENTION: 0
BLOOD IN STOOL: 0
DIARRHEA: 0
SHORTNESS OF BREATH: 0
APNEA: 0
CHEST TIGHTNESS: 0
CONSTIPATION: 0
SORE THROAT: 0
COUGH: 0
ABDOMINAL PAIN: 0
SINUS PAIN: 0
VOMITING: 0
BACK PAIN: 1

## 2024-06-11 ASSESSMENT — LIFESTYLE VARIABLES
HOW OFTEN DO YOU HAVE A DRINK CONTAINING ALCOHOL: NEVER
HOW MANY STANDARD DRINKS CONTAINING ALCOHOL DO YOU HAVE ON A TYPICAL DAY: PATIENT DOES NOT DRINK

## 2024-06-18 ENCOUNTER — TELEPHONE (OUTPATIENT)
Dept: PRIMARY CARE CLINIC | Age: 84
End: 2024-06-18

## 2024-06-18 NOTE — TELEPHONE ENCOUNTER
Patient called requesting her Propranolol 20 mg she takes one tablet by mouth daily. She states that Dr. Grazyna Cole prescribed the mediation.  She needs it sent to [facility]

## 2024-06-19 DIAGNOSIS — G25.0 ESSENTIAL TREMOR: Primary | ICD-10-CM

## 2024-06-19 RX ORDER — PROPRANOLOL HYDROCHLORIDE 20 MG/1
20 TABLET ORAL 2 TIMES DAILY
Qty: 90 TABLET | Refills: 1 | Status: SHIPPED | OUTPATIENT
Start: 2024-06-19 | End: 2024-12-16

## 2024-06-19 NOTE — TELEPHONE ENCOUNTER
Called and spoke with patient. Patient reports the medication was prescribed by her neurologist at [facility] for essential tremors and that he retired 3.5 years ago. This LPN asked patient who has been prescribing the medication for the last 3.5 years, and she stated her geriatric MD at [facility], but she does not see her anymore. Informed patient that she needs to contact that office and have records faxed to Dr Vasquez at [phone]. Patient repeatedly stated the records are in her chart. This LPN repeatedly informed patient that we do not have access to those records and they would need to be faxed. Patient verbalized understanding.

## 2024-07-05 ENCOUNTER — TELEPHONE (OUTPATIENT)
Dept: PRIMARY CARE CLINIC | Age: 84
End: 2024-07-05

## 2024-07-05 NOTE — TELEPHONE ENCOUNTER
Pt came in 7/2/2024 stating her Memantine is running low and she needs it refilled and needs the quantity raised (stated that she was told to take the medication twice a day). Also stated she needed her Propranolol refilled (she did state that Dr. Vasquez wouldn't refill the medication until she received medical records from previous physician). She signed a records release form and it was faxed over on 7/5/2024.

## 2024-07-08 DIAGNOSIS — G25.0 ESSENTIAL TREMOR: ICD-10-CM

## 2024-07-08 DIAGNOSIS — F09 COGNITIVE DISORDER: ICD-10-CM

## 2024-07-08 RX ORDER — MEMANTINE HYDROCHLORIDE 10 MG/1
10 TABLET ORAL 2 TIMES DAILY
Qty: 180 TABLET | Refills: 1 | Status: SHIPPED | OUTPATIENT
Start: 2024-07-08

## 2024-07-08 RX ORDER — PROPRANOLOL HYDROCHLORIDE 20 MG/1
20 TABLET ORAL DAILY PRN
Qty: 90 TABLET | Refills: 1 | Status: SHIPPED | OUTPATIENT
Start: 2024-07-08 | End: 2025-01-04

## 2024-07-10 ENCOUNTER — OFFICE VISIT (OUTPATIENT)
Dept: PRIMARY CARE CLINIC | Age: 84
End: 2024-07-10
Payer: MEDICARE

## 2024-07-10 VITALS
HEART RATE: 73 BPM | WEIGHT: 129.6 LBS | BODY MASS INDEX: 23.7 KG/M2 | TEMPERATURE: 99.3 F | DIASTOLIC BLOOD PRESSURE: 74 MMHG | OXYGEN SATURATION: 97 % | SYSTOLIC BLOOD PRESSURE: 120 MMHG

## 2024-07-10 DIAGNOSIS — J01.00 ACUTE MAXILLARY SINUSITIS, RECURRENCE NOT SPECIFIED: ICD-10-CM

## 2024-07-10 DIAGNOSIS — R05.1 ACUTE COUGH: Primary | ICD-10-CM

## 2024-07-10 LAB — NONINV COLON CA DNA+OCC BLD SCRN STL QL: NORMAL

## 2024-07-10 PROCEDURE — 99213 OFFICE O/P EST LOW 20 MIN: CPT | Performed by: NURSE PRACTITIONER

## 2024-07-10 PROCEDURE — G8420 CALC BMI NORM PARAMETERS: HCPCS | Performed by: NURSE PRACTITIONER

## 2024-07-10 PROCEDURE — 1123F ACP DISCUSS/DSCN MKR DOCD: CPT | Performed by: NURSE PRACTITIONER

## 2024-07-10 PROCEDURE — 1090F PRES/ABSN URINE INCON ASSESS: CPT | Performed by: NURSE PRACTITIONER

## 2024-07-10 PROCEDURE — G8427 DOCREV CUR MEDS BY ELIG CLIN: HCPCS | Performed by: NURSE PRACTITIONER

## 2024-07-10 PROCEDURE — 1036F TOBACCO NON-USER: CPT | Performed by: NURSE PRACTITIONER

## 2024-07-10 PROCEDURE — G8400 PT W/DXA NO RESULTS DOC: HCPCS | Performed by: NURSE PRACTITIONER

## 2024-07-10 RX ORDER — OLOPATADINE HYDROCHLORIDE 2 MG/ML
1 SOLUTION/ DROPS OPHTHALMIC DAILY
Qty: 2.5 ML | Refills: 0 | Status: SHIPPED | OUTPATIENT
Start: 2024-07-10

## 2024-07-10 RX ORDER — CEFDINIR 300 MG/1
300 CAPSULE ORAL 2 TIMES DAILY
Qty: 20 CAPSULE | Refills: 0 | Status: SHIPPED | OUTPATIENT
Start: 2024-07-10 | End: 2024-07-20

## 2024-07-10 RX ORDER — BENZONATATE 100 MG/1
100 CAPSULE ORAL 3 TIMES DAILY PRN
Qty: 21 CAPSULE | Refills: 0 | Status: SHIPPED | OUTPATIENT
Start: 2024-07-10 | End: 2024-07-17

## 2024-07-10 ASSESSMENT — ENCOUNTER SYMPTOMS
ALLERGIC/IMMUNOLOGIC NEGATIVE: 1
SORE THROAT: 1
EYE REDNESS: 1
COUGH: 1
GASTROINTESTINAL NEGATIVE: 1

## 2024-07-10 NOTE — PROGRESS NOTES
Gastrointestinal: Negative.    Endocrine: Negative.    Genitourinary: Negative.    Musculoskeletal: Negative.    Skin: Negative.    Allergic/Immunologic: Negative.    Neurological: Negative.    Hematological: Negative.    Psychiatric/Behavioral: Negative.            Objective   Physical Exam  Vitals and nursing note reviewed.   Constitutional:       Appearance: Normal appearance.   HENT:      Head: Normocephalic.      Right Ear: A middle ear effusion is present.      Nose:      Right Sinus: Maxillary sinus tenderness present.      Left Sinus: Maxillary sinus tenderness present.      Mouth/Throat:      Mouth: Mucous membranes are moist.      Pharynx: Oropharynx is clear. No posterior oropharyngeal erythema.   Cardiovascular:      Rate and Rhythm: Normal rate and regular rhythm.      Pulses: Normal pulses.      Heart sounds: Normal heart sounds.   Pulmonary:      Effort: Pulmonary effort is normal.      Breath sounds: Normal breath sounds. No wheezing or rhonchi.   Abdominal:      General: Abdomen is flat. Bowel sounds are normal.      Palpations: Abdomen is soft.      Tenderness: There is no abdominal tenderness.   Musculoskeletal:         General: Normal range of motion.      Cervical back: Normal range of motion.   Skin:     General: Skin is warm and dry.   Neurological:      Mental Status: She is alert and oriented to person, place, and time.   Psychiatric:         Mood and Affect: Mood normal.         Behavior: Behavior normal.            ASSESSMENT/PLAN:  1. Acute cough  2. Acute maxillary sinusitis, recurrence not specified      Return for keep follow up with PCP.     Omnicef 300mg bid x 10 days  Pataday eye drop  Tessalon perles.   Keep follow up with PCP      PDMP Monitoring:    Last PDMP Saurav as Reviewed:  Review User Review Instant Review Result            Urine Drug Screenings (1 yr)    No resulted procedures found.       Medication Contract and Consent for Opioid Use Documents Filed       Patient

## 2024-07-15 NOTE — TELEPHONE ENCOUNTER
Paulette Sutton called to request a refill on her medication.      Last office visit : 7/10/2024   Next office visit : 12/12/2024     Requested Prescriptions     Pending Prescriptions Disp Refills    estradiol (CLIMARA) 0.025 MG/24HR [Pharmacy Med Name: ESTRADIOL 0.025 MG PATCH(1/WK)] 4 patch 3     Sig: PLACE 1 PATCH ONTO THE SKIN EVERY 7 DAYS.            Manasa Cochran LPN

## 2024-07-22 ENCOUNTER — OFFICE VISIT (OUTPATIENT)
Dept: PRIMARY CARE CLINIC | Age: 84
End: 2024-07-22
Payer: MEDICARE

## 2024-07-22 VITALS
DIASTOLIC BLOOD PRESSURE: 74 MMHG | OXYGEN SATURATION: 97 % | TEMPERATURE: 97.1 F | HEIGHT: 62 IN | SYSTOLIC BLOOD PRESSURE: 110 MMHG | BODY MASS INDEX: 23.37 KG/M2 | RESPIRATION RATE: 18 BRPM | HEART RATE: 80 BPM | WEIGHT: 127 LBS

## 2024-07-22 DIAGNOSIS — N39.3 STRESS INCONTINENCE: ICD-10-CM

## 2024-07-22 DIAGNOSIS — H10.13 ALLERGIC CONJUNCTIVITIS OF BOTH EYES AND RHINITIS: Primary | ICD-10-CM

## 2024-07-22 DIAGNOSIS — G30.9 ALZHEIMER'S DISEASE, UNSPECIFIED (CODE) (HCC): ICD-10-CM

## 2024-07-22 DIAGNOSIS — J30.9 ALLERGIC CONJUNCTIVITIS OF BOTH EYES AND RHINITIS: Primary | ICD-10-CM

## 2024-07-22 DIAGNOSIS — G25.0 ESSENTIAL TREMOR: ICD-10-CM

## 2024-07-22 DIAGNOSIS — E11.00 TYPE 2 DIABETES MELLITUS WITH HYPEROSMOLARITY WITHOUT COMA, WITHOUT LONG-TERM CURRENT USE OF INSULIN (HCC): ICD-10-CM

## 2024-07-22 PROBLEM — E11.9 TYPE 2 DIABETES MELLITUS (HCC): Status: ACTIVE | Noted: 2024-07-22

## 2024-07-22 PROCEDURE — 1090F PRES/ABSN URINE INCON ASSESS: CPT | Performed by: INTERNAL MEDICINE

## 2024-07-22 PROCEDURE — 99213 OFFICE O/P EST LOW 20 MIN: CPT | Performed by: INTERNAL MEDICINE

## 2024-07-22 PROCEDURE — G8400 PT W/DXA NO RESULTS DOC: HCPCS | Performed by: INTERNAL MEDICINE

## 2024-07-22 PROCEDURE — G8420 CALC BMI NORM PARAMETERS: HCPCS | Performed by: INTERNAL MEDICINE

## 2024-07-22 PROCEDURE — 1036F TOBACCO NON-USER: CPT | Performed by: INTERNAL MEDICINE

## 2024-07-22 PROCEDURE — G8427 DOCREV CUR MEDS BY ELIG CLIN: HCPCS | Performed by: INTERNAL MEDICINE

## 2024-07-22 PROCEDURE — 1123F ACP DISCUSS/DSCN MKR DOCD: CPT | Performed by: INTERNAL MEDICINE

## 2024-07-22 RX ORDER — OLOPATADINE HYDROCHLORIDE 1 MG/ML
1 SOLUTION/ DROPS OPHTHALMIC 2 TIMES DAILY
Qty: 5 ML | Refills: 2 | Status: SHIPPED | OUTPATIENT
Start: 2024-07-22 | End: 2024-12-19

## 2024-07-22 ASSESSMENT — ENCOUNTER SYMPTOMS
DIARRHEA: 0
EYE ITCHING: 1
WHEEZING: 0
NAUSEA: 0
SORE THROAT: 0
CHEST TIGHTNESS: 0
ABDOMINAL PAIN: 0
SHORTNESS OF BREATH: 0
BACK PAIN: 1
SINUS PAIN: 0
VOMITING: 0
TROUBLE SWALLOWING: 0
APNEA: 0
COUGH: 0
CONSTIPATION: 0
BLOOD IN STOOL: 0
ABDOMINAL DISTENTION: 0

## 2024-07-22 NOTE — PROGRESS NOTES
Paulette Sutton ( 1940) is a 83 y.o. female, Established , here for evaluation of the following chief complaint(s).  Referral - General (Wants referrals to urology and neurology. Wants steroid injection for eyes itching and red. Wants ears checked. Had infection 2 weeks ago. )      Patient was encouraged and advised to be compliant with all  medications leads an active lifestyle and promote maintaining a healthy weight, encouraged not to use cigarettes, laboratory results discussed and reviewed with patient's during this visit   ASSESSMENT/PLAN:  Problem List          Respiratory    Allergic conjunctivitis of both eyes and rhinitis - Primary      Uncontrolled, probably related to Nail polish, advised to get rid of nail polish, try patanol, will not provide steroid injection         Relevant Medications    levocetirizine (XYZAL) 5 MG tablet    Azelastine HCl 137 MCG/SPRAY SOLN    olopatadine (PATANOL) 0.1 % ophthalmic solution       Endocrine    Type 2 diabetes mellitus    Relevant Medications    metFORMIN (GLUCOPHAGE) 500 MG tablet       Nervous and Auditory    Alzheimer's disease, unspecified      Well-controlled, continue current medications         Relevant Medications    memantine (NAMENDA) 10 MG tablet    Essential tremor      Well-controlled, continue current medications, doing well on Propranolol         Relevant Medications    propranolol (INDERAL) 20 MG tablet       Other    Stress incontinence       Would like a referral to Urogynecology         Relevant Orders    External Referral To Urogynecology              No data to display                    2024    12:50 PM 2023    12:52 PM 2022     2:57 PM   PHQ Scores   PHQ2 Score 0 1 0   PHQ9 Score 0 1 0       Results for orders placed or performed in visit on 24   Fecal DNA Colorectal cancer screening (Cologuard)    Specimen: Stool   Result Value Ref Range    FIT-DNA (Cologuard) Sample Could Not Be Processed 4 N/A       No follow-ups

## 2024-07-22 NOTE — ASSESSMENT & PLAN NOTE
Uncontrolled, probably related to Nail polish, advised to get rid of nail polish, try patanol, will not provide steroid injection

## 2024-07-27 ENCOUNTER — OFFICE VISIT (OUTPATIENT)
Age: 84
End: 2024-07-27
Payer: MEDICARE

## 2024-07-27 VITALS
RESPIRATION RATE: 20 BRPM | WEIGHT: 125 LBS | OXYGEN SATURATION: 97 % | SYSTOLIC BLOOD PRESSURE: 122 MMHG | BODY MASS INDEX: 22.86 KG/M2 | DIASTOLIC BLOOD PRESSURE: 70 MMHG | TEMPERATURE: 97.9 F | HEART RATE: 83 BPM

## 2024-07-27 DIAGNOSIS — H66.001 NON-RECURRENT ACUTE SUPPURATIVE OTITIS MEDIA OF RIGHT EAR WITHOUT SPONTANEOUS RUPTURE OF TYMPANIC MEMBRANE: Primary | ICD-10-CM

## 2024-07-27 DIAGNOSIS — J02.9 SORE THROAT: ICD-10-CM

## 2024-07-27 DIAGNOSIS — R05.1 ACUTE COUGH: ICD-10-CM

## 2024-07-27 LAB
Lab: NORMAL
QC PASS/FAIL: NORMAL
S PYO AG THROAT QL: NORMAL
SARS-COV-2 RDRP RESP QL NAA+PROBE: NEGATIVE

## 2024-07-27 PROCEDURE — 87635 SARS-COV-2 COVID-19 AMP PRB: CPT

## 2024-07-27 PROCEDURE — 99213 OFFICE O/P EST LOW 20 MIN: CPT

## 2024-07-27 PROCEDURE — 87880 STREP A ASSAY W/OPTIC: CPT

## 2024-07-27 PROCEDURE — 1123F ACP DISCUSS/DSCN MKR DOCD: CPT

## 2024-07-27 RX ORDER — FLUTICASONE PROPIONATE 50 MCG
1 SPRAY, SUSPENSION (ML) NASAL DAILY
Qty: 1 EACH | Refills: 0 | Status: SHIPPED | OUTPATIENT
Start: 2024-07-27

## 2024-07-27 RX ORDER — AMOXICILLIN AND CLAVULANATE POTASSIUM 500; 125 MG/1; MG/1
1 TABLET, FILM COATED ORAL 2 TIMES DAILY
Qty: 14 TABLET | Refills: 0 | Status: SHIPPED | OUTPATIENT
Start: 2024-07-27 | End: 2024-08-03

## 2024-07-27 ASSESSMENT — ENCOUNTER SYMPTOMS
NAUSEA: 0
VOMITING: 0
DIARRHEA: 0
EYE ITCHING: 0
BLOOD IN STOOL: 0
SINUS PRESSURE: 0
ABDOMINAL PAIN: 0
WHEEZING: 0
CONSTIPATION: 0
EYE DISCHARGE: 0
SORE THROAT: 1
COLOR CHANGE: 0
RHINORRHEA: 0
COUGH: 1
SHORTNESS OF BREATH: 0

## 2024-07-27 NOTE — PROGRESS NOTES
History     Tobacco Use    Smoking status: Former     Current packs/day: 0.00     Average packs/day: 0.4 packs/day for 5.0 years (1.8 ttl pk-yrs)     Types: Cigarettes     Start date:      Quit date: 1970     Years since quittin.6    Smokeless tobacco: Never   Substance Use Topics    Alcohol use: Not Currently     Comment: states she drank socially until she had an allergy to it at age 30        Current Outpatient Medications   Medication Sig Dispense Refill    amoxicillin-clavulanate (AUGMENTIN) 500-125 MG per tablet Take 1 tablet by mouth in the morning and at bedtime for 7 days 14 tablet 0    fluticasone (FLONASE) 50 MCG/ACT nasal spray 1 spray by Each Nostril route daily 1 each 0    olopatadine (PATANOL) 0.1 % ophthalmic solution Place 1 drop into both eyes 2 times daily 5 mL 2    estradiol (CLIMARA) 0.025 MG/24HR PLACE 1 PATCH ONTO THE SKIN EVERY 7 DAYS. 4 patch 3    olopatadine (PATADAY) 0.2 % SOLN ophthalmic solution Place 1 drop into both eyes daily 2.5 mL 0    memantine (NAMENDA) 10 MG tablet Take 1 tablet by mouth 2 times daily 180 tablet 1    propranolol (INDERAL) 20 MG tablet Take 1 tablet by mouth daily as needed (tremor) 90 tablet 1    Azelastine HCl 137 MCG/SPRAY SOLN 2 SPRAYS BY NASAL ROUTE 2 (TWO) TIMES DAILY. USE IN EACH NOSTRIL AS DIRECTED 3 each 1    metFORMIN (GLUCOPHAGE) 500 MG tablet TAKE 1 TABLET BY MOUTH EVERY DAY WITH BREAKFAST 90 tablet 1    PROGESTERONE PO by Other route      melatonin 5 MG TBDP disintegrating tablet Take 2 tablets by mouth nightly 10 tablet 0    atorvastatin (LIPITOR) 80 MG tablet Take 0.5 tablets by mouth nightly 30 tablet 3    tretinoin (RETIN-A) 0.05 % cream APPLY PEA-SIZED AMOUNT TO ENTIRE FACE NIGHTLY..      Lysine 500 MG CAPS Take by mouth daily      loperamide (IMODIUM A-D) 2 MG tablet Take 0.5 tablets by mouth      levocetirizine (XYZAL) 5 MG tablet Take 1 tablet by mouth      esomeprazole Magnesium (NEXIUM) 20 MG PACK Take 1 packet by mouth      dextran

## 2024-07-27 NOTE — PATIENT INSTRUCTIONS
-Strep and COVID testing are negative today.  - Right TM fluid is dull/cloudy and bulging will start on antibiotics  - Increase fluid intake  - Recommended OTC flonase  - Recommended OTC claritin  - The patient is to follow up with PCP or return to clinic if symptoms worsen/fail to improve. .     Any condition can change, despite proper treatment. Therefore, if symptoms still persist or worsen after treatment plan intitated today, either go to the nearest ER, or call PCP, or return to UC for further evaluation.    Urgent Care evaluation today is not a substitute for PCP visit. Follow up care is your responsibility to discuss and review this UC visit.     Discussed use, benefits, and side effects of any prescribed medications. All patient questions were answered. Patient demonstrates understanding and agrees with care plan. Patient was given educational materials - see patient instructions below

## 2024-08-01 RX ORDER — OLOPATADINE HYDROCHLORIDE OPHTHALMIC 2 MG/ML
SOLUTION OPHTHALMIC
Qty: 7.5 ML | Refills: 1 | Status: SHIPPED | OUTPATIENT
Start: 2024-08-01

## 2024-08-01 NOTE — TELEPHONE ENCOUNTER
Paulette Sutton called to request a refill on her medication.      Last office visit : 7/22/2024   Next office visit : 12/12/2024     Requested Prescriptions     Pending Prescriptions Disp Refills    [facility] OLOPATADINE HCL 0.2 % SOLN ophthalmic solution [Pharmacy Med Name: [facility] OLOPATADINE 0.2% EYE DROP] 7.5 mL 1     Sig: INSTILL 1 DROP INTO BOTH EYES EVERY DAY            Manasa Cochran LPN

## 2024-08-06 ENCOUNTER — OFFICE VISIT (OUTPATIENT)
Dept: OBSTETRICS AND GYNECOLOGY | Age: 84
End: 2024-08-06
Payer: MEDICARE

## 2024-08-06 VITALS
WEIGHT: 125 LBS | BODY MASS INDEX: 23 KG/M2 | HEIGHT: 62 IN | DIASTOLIC BLOOD PRESSURE: 74 MMHG | SYSTOLIC BLOOD PRESSURE: 106 MMHG

## 2024-08-06 DIAGNOSIS — N95.0 POST-MENOPAUSAL BLEEDING: Primary | ICD-10-CM

## 2024-08-06 DIAGNOSIS — Z87.891 FORMER SMOKER: ICD-10-CM

## 2024-08-06 PROCEDURE — 1160F RVW MEDS BY RX/DR IN RCRD: CPT | Performed by: NURSE PRACTITIONER

## 2024-08-06 PROCEDURE — 99213 OFFICE O/P EST LOW 20 MIN: CPT | Performed by: NURSE PRACTITIONER

## 2024-08-06 PROCEDURE — 1159F MED LIST DOCD IN RCRD: CPT | Performed by: NURSE PRACTITIONER

## 2024-08-06 RX ORDER — FLUTICASONE PROPIONATE 50 MCG
1 SPRAY, SUSPENSION (ML) NASAL
COMMUNITY
Start: 2024-07-27

## 2024-08-06 RX ORDER — OLOPATADINE HYDROCHLORIDE 2 MG/ML
SOLUTION/ DROPS OPHTHALMIC
COMMUNITY
Start: 2024-08-01

## 2024-08-06 RX ORDER — OLOPATADINE HYDROCHLORIDE 1 MG/ML
1 SOLUTION/ DROPS OPHTHALMIC
COMMUNITY
Start: 2024-07-22 | End: 2024-12-20

## 2024-08-06 NOTE — PROGRESS NOTES
"Chief Complaint  Gynecologic Exam (Pt is here for a three month f/u with US to r/t minimal endometrial thickness.  4//25/24 endometrium 1.6 mm )  History of Present Illness  The patient is an 83-year-old female who presents for evaluation of her uterine lining.    She last visited in 04/2024. She began taking progesterone in October 2023 and had some spotting prior to her last visit.   She has not experienced any spotting since her last visit.    Subjective          Ricarda Hatch presents to [facility]  History of Present Illness    Review of Systems   Genitourinary:         No vaginal bleeding since before last visit.            Objective   Vital Signs:   /74   Ht 157.5 cm (62\")   Wt 56.7 kg (125 lb)   BMI 22.86 kg/m²     Physical Exam  Vitals reviewed.   Constitutional:       Appearance: She is well-developed.   Eyes:      General:         Right eye: No discharge.         Left eye: No discharge.   Cardiovascular:      Rate and Rhythm: Normal rate and regular rhythm.   Pulmonary:      Effort: Pulmonary effort is normal.      Breath sounds: Normal breath sounds.   Skin:     General: Skin is warm.   Neurological:      Mental Status: She is alert and oriented to person, place, and time.   Psychiatric:         Behavior: Behavior normal.         Thought Content: Thought content normal.         Judgment: Judgment normal.         Result Review :   The following data was reviewed by: GALINDO Martinez on 08/06/2024:    Data reviewed : Radiologic studies transvaginal US    Impression:     1.  Uterus: Normal size and Anteverted     2.  Endometrium:  1.9 mm      3.  Myometrium: Single fibroid 1 cm      4.  Ovaries  Left:    Not visualized  and Surgically absent  Right:  Not visualized  and Surgically absent        Relevant comparison data: No relevant comparison data     Shayan Rowe MD  8/6/2024 11:46 CDT        Current Outpatient Medications on File Prior to Visit   Medication " Sig    Azelastine HCl 137 MCG/SPRAY solution 2 SPRAYS BY NASAL ROUTE 2 (TWO) TIMES DAILY. USE IN EACH NOSTRIL AS DIRECTED    calcium citrate (CALCITRATE) 950 (200 Ca) MG tablet Take 2 tablets by mouth.    Cholecalciferol 25 MCG (1000 UT) tablet Take 1 tablet by mouth.    coenzyme Q10 100 MG capsule Take 1 capsule by mouth Daily.    esomeprazole (NexIUM) 20 MG packet Take 20 mg by mouth.    estradiol (CLIMARA) 0.025 MG/24HR patch PLACE 1 PATCH ONTO THE SKIN EVERY 7 DAYS.    estradiol (ESTRACE) 0.1 MG/GM vaginal cream PLACE 1 GRAM VAGINALLY TWICE A WEEK    fluticasone (FLONASE) 50 MCG/ACT nasal spray 1 spray.    levocetirizine (XYZAL) 5 MG tablet Take 1 tablet by mouth.    liothyronine (CYTOMEL) 5 MCG tablet TAKE 1 TABLET EVERY DAY BY ORAL ROUTE BEFORE MEAL(S).    loperamide (IMODIUM A-D) 2 MG tablet Take 0.5 tablets by mouth.    Lysine 500 MG capsule Take  by mouth Daily.    memantine (NAMENDA) 10 MG tablet     metFORMIN (GLUCOPHAGE) 500 MG tablet Take 1 tablet by mouth Daily With Breakfast.    olopatadine (PATADAY) 0.2 % solution ophthalmic solution INSTILL 1 DROP INTO BOTH EYES EVERY DAY    PEPPERMINT OIL PO Take 1 capsule by mouth 2 (Two) Times a Day.    Progesterone (PROMETRIUM) 100 MG capsule TAKE 1 CAPSULE BY MOUTH EVERY DAY AT BEDTIME FOR 30 DAYS    propranolol (INDERAL) 20 MG tablet Take 1 tablet by mouth Daily.    traMADol (ULTRAM) 50 MG tablet Take 1 tablet by mouth Every 6 (Six) Hours As Needed for Moderate Pain.    albuterol (PROVENTIL) (2.5 MG/3ML) 0.083% nebulizer solution Inhale 2.5 mg. (Patient not taking: Reported on 4/25/2024)    Alpha-Lipoic Acid 100 MG tablet Take 200 mg by mouth. (Patient not taking: Reported on 4/25/2024)    Apoaequorin 10 MG capsule Take 70 mg by mouth. (Patient not taking: Reported on 4/25/2024)    Apoaequorin 20 MG capsule Take 70 mg by mouth Daily. (Patient not taking: Reported on 4/25/2024)    olopatadine (PATANOL) 0.1 % ophthalmic solution 1 drop. (Patient not taking:  Reported on 8/6/2024)    predniSONE (DELTASONE) 5 MG tablet Please see attached for detailed directions (Patient not taking: Reported on 4/25/2024)    Teriparatide, Recombinant, (FORTEO) 600 MCG/2.4ML injection Inject 0.08 mL under the skin into the appropriate area as directed Daily. (Patient not taking: Reported on 4/25/2024)     No current facility-administered medications on file prior to visit.          Assessment and Plan      Assessment & Plan  Hx of PMB  The size of the fibroid in the uterus has remained consistent, and the endometrial lining has slightly increased from 1.6 mm in 04/2024 to 1.9 mm. A repeat ultrasound is recommended.   Should any further bleeding occur, a follow-up appointment will be necessary.  Discussed case with Dr. Craig, she agrees with above plan.       Follow-up  A follow-up appointment is scheduled for 3 months from now.    Diagnoses and all orders for this visit:    1. Post-menopausal bleeding (Primary)    2. Former smoker          BMI is within normal parameters. No other follow-up for BMI required.       Follow Up   Return in about 3 months (around 11/6/2024) for US and OV.    Patient was given instructions and counseling regarding her condition or for health maintenance advice. Please see specific information pulled into the AVS if appropriate.       Patient or patient representative verbalized consent for the use of Ambient Listening during the visit with  GALINDO Martinez for chart documentation. 8/6/2024  13:17 CDT

## 2024-08-13 ENCOUNTER — OFFICE VISIT (OUTPATIENT)
Dept: INTERNAL MEDICINE | Facility: CLINIC | Age: 84
End: 2024-08-13
Payer: MEDICARE

## 2024-08-13 VITALS
TEMPERATURE: 97.9 F | HEIGHT: 62 IN | WEIGHT: 126.8 LBS | SYSTOLIC BLOOD PRESSURE: 122 MMHG | BODY MASS INDEX: 23.34 KG/M2 | DIASTOLIC BLOOD PRESSURE: 74 MMHG | OXYGEN SATURATION: 94 % | HEART RATE: 71 BPM

## 2024-08-13 DIAGNOSIS — E11.69 TYPE 2 DIABETES MELLITUS WITH OTHER SPECIFIED COMPLICATION, UNSPECIFIED WHETHER LONG TERM INSULIN USE: Primary | ICD-10-CM

## 2024-08-13 DIAGNOSIS — J06.9 ACUTE URI: ICD-10-CM

## 2024-08-13 DIAGNOSIS — E03.9 HYPOTHYROIDISM, UNSPECIFIED TYPE: ICD-10-CM

## 2024-08-13 DIAGNOSIS — H65.32 CHRONIC MUCOID OTITIS MEDIA OF LEFT EAR: ICD-10-CM

## 2024-08-13 DIAGNOSIS — E78.5 HYPERLIPIDEMIA, UNSPECIFIED HYPERLIPIDEMIA TYPE: Chronic | ICD-10-CM

## 2024-08-13 DIAGNOSIS — M15.9 OSTEOARTHRITIS OF MULTIPLE JOINTS, UNSPECIFIED OSTEOARTHRITIS TYPE: Chronic | ICD-10-CM

## 2024-08-13 DIAGNOSIS — Z79.890 POSTMENOPAUSAL HRT (HORMONE REPLACEMENT THERAPY): ICD-10-CM

## 2024-08-13 PROBLEM — E11.9 TYPE 2 DIABETES MELLITUS: Status: ACTIVE | Noted: 2024-07-22

## 2024-08-13 PROBLEM — M47.816 FACET ARTHRITIS OF LUMBAR REGION: Status: ACTIVE | Noted: 2018-12-18

## 2024-08-13 PROBLEM — J31.0 CHRONIC RHINITIS: Status: ACTIVE | Noted: 2022-12-05

## 2024-08-13 RX ORDER — AZITHROMYCIN 250 MG/1
TABLET, FILM COATED ORAL
Qty: 6 TABLET | Refills: 0 | Status: SHIPPED | OUTPATIENT
Start: 2024-08-13

## 2024-08-13 NOTE — PROGRESS NOTES
"      Chief Complaint  Establish Care (Had augmentin for an ear infection about a month ago, and feels like it is back, L ear feels a little full.)    Subjective        Ricarda Hatch presents to [facility]    HPI    Patient here for the above problems.  See Assessment and Plan for further HPI components.      Review of Systems    Objective   Vital Signs:  /74 (BP Location: Left arm, Patient Position: Sitting, Cuff Size: Adult)   Pulse 71   Temp 97.9 °F (36.6 °C) (Temporal)   Ht 157.5 cm (62.01\")   Wt 57.5 kg (126 lb 12.8 oz)   SpO2 94%   BMI 23.19 kg/m²   Estimated body mass index is 23.19 kg/m² as calculated from the following:    Height as of this encounter: 157.5 cm (62.01\").    Weight as of this encounter: 57.5 kg (126 lb 12.8 oz).      Physical Exam  Vitals and nursing note reviewed.   Constitutional:       Appearance: She is not ill-appearing.   HENT:      Right Ear: Ear canal normal. A middle ear effusion is present.      Left Ear: Tympanic membrane and ear canal normal.   Eyes:      General: No scleral icterus.     Conjunctiva/sclera: Conjunctivae normal.   Pulmonary:      Effort: Pulmonary effort is normal. No respiratory distress.   Skin:     General: Skin is warm.      Coloration: Skin is not pale.   Neurological:      General: No focal deficit present.      Mental Status: She is alert and oriented to person, place, and time.   Psychiatric:         Mood and Affect: Mood normal.         Behavior: Behavior normal.                     Assessment and Plan   Diagnoses and all orders for this visit:    1. Type 2 diabetes mellitus with other specified complication, unspecified whether long term insulin use (Primary)  -     CBC & Differential  -     Comprehensive Metabolic Panel  -     Cancel: Urinalysis With Microscopic - Urine, Clean Catch  -     Lipid Panel  -     TSH Rfx On Abnormal To Free T4  -     Hemoglobin A1c  -     Cancel: Microalbumin / Creatinine Urine Ratio " - Urine, Clean Catch    2. Postmenopausal HRT (hormone replacement therapy)    3. Osteoarthritis of multiple joints, unspecified osteoarthritis type    4. Acute URI  -     azithromycin (Zithromax Z-Landon) 250 MG tablet; Take 2 tablets by mouth on day 1, then 1 tablet daily on days 2-5  Dispense: 6 tablet; Refill: 0    5. 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 Hyperlipidemia, unspecified hyperlipidemia type    7. Hypothyroidism, unspecified type    Other orders  -     Manual Differential          Patient has diabetes.  Current A1c is 5.6.  5.60  Based on patient's last A1c they are currently at goal.. The patient's A1c goal is < 7.0%  Recommend routine monitoring of blood sugar. Avoid hypoglycemia. Recommend ADA diet and avoidance of excess carbohydrates. Patient is currently on metformin.  Recommend that we continue current regimen.  .  Recommend at least annual eye examination.  Recommend at least annual diabetic foot examination.  Recommend checking self checks of feet routinely.Patient has the following diabetic complications:  none .    Continue to monitor.  Patient's last diabetic nephropathy evaluation was:  External Protein, Urine (no units)   Date/Time Value   11/15/2019 1618 Negative     A1C Last 3 Results          10/25/2023    19:27 8/13/2024    15:21   HGBA1C Last 3 Results   Hemoglobin A1C 5.3     5.60       Details          This result is from an external source.              Patient appears to be on hormone replacement therapy.  Follows with OB/GYN.  Recommend them continue to monitor this.    Patient has signs and symptoms of an upper respiratory infection.  Patient has had symptoms forSeveral days.  Patient has a little bit of a small effusion in the right ear.  It appears to be mucoid.  We will treat with an azithromycin pack.  Continue Flonase.   Continue Xyzal.    Patient has a history of hyperlipidemia.  Patient is not on a statin but does take some supplements that may help with this.    Patient has a history of hypothyroidism.  Patient is on Cytomel.  Unsure why patient is not on levothyroxine.  Patient is not sure either.    Result Review :  The following data was reviewed by: Sergio Valentin MD on 08/13/2024:  CMP          8/13/2024    15:21   CMP   Glucose 89    BUN 23    Creatinine 0.84    Sodium 126    Potassium 4.7    Chloride 88    Calcium 9.6    Total Protein 7.1    Albumin 4.4    Globulin 2.7    Total Bilirubin 0.4    Alkaline Phosphatase 62    AST (SGOT) 17    ALT (SGPT) 10    BUN/Creatinine Ratio 27.4      CBC w/diff          10/25/2023    19:27 10/26/2023    01:50 8/13/2024    15:21   CBC w/Diff   WBC 5.9     6.1     10.40    RBC 4.74     4.31     5.29    Hemoglobin 11.3     10.0     12.1    Hematocrit 34.5     31.7     38.1    MCV 72.8     73.5     72.0    MCH 23.8     23.2     22.9    MCHC 32.8     31.5     31.8    RDW 15.4     14.9     15.5    Platelets 249     230     346    Neutrophil Rel % 52.8     46.6        Lymphocyte Rel % 36.0     41.4        Monocyte Rel % 7.7     8.8        Eosinophil Rel % 2.5     2.3        Basophil Rel % 0.8     0.7           Details          This result is from an external source.             Lipid Panel          10/5/2023    11:04 10/25/2023    19:27 8/13/2024    15:21   Lipid Panel   Total Cholesterol 212     195     221    Triglycerides 52     117     107    HDL Cholesterol 96     86     95    VLDL Cholesterol   18    LDL Cholesterol  106     86     108       Details          This result is from an external source.             TSH          8/13/2024    15:21   TSH   TSH 1.480      BMP          8/13/2024    15:21   BMP   BUN 23    Creatinine 0.84    Sodium 126    Potassium 4.7    Chloride 88    CO2 25.5    Calcium 9.6      UA          10/25/2023    19:43   Urinalysis   Specific Gravity, UA 1.010        Blood, UA Negative       Leukocytes, UA Negative       Nitrite, UA Negative          Details          This result is from an external source.                  BMI is within normal parameters. No other follow-up for BMI required.      BMI is within normal parameters. No other follow-up for BMI required.            Follow Up   Return in about 6 months (around 2/13/2025), or if symptoms worsen or fail to improve, for follow up for above problems. Longitudinal care., Med Check.  Patient was given instructions and counseling regarding her condition or for health maintenance advice. Please see specific information pulled into the AVS if appropriate.       OPAL Valentin MD, FACP, [facility]      Electronically signed by Sergio Valentin MD, 08/13/24, 4:15 PM CDT.

## 2024-08-14 LAB
ALBUMIN SERPL-MCNC: 4.4 G/DL (ref 3.5–5.2)
ALBUMIN/GLOB SERPL: 1.6 G/DL
ALP SERPL-CCNC: 62 U/L (ref 39–117)
ALT SERPL-CCNC: 10 U/L (ref 1–33)
AST SERPL-CCNC: 17 U/L (ref 1–32)
BILIRUB SERPL-MCNC: 0.4 MG/DL (ref 0–1.2)
BUN SERPL-MCNC: 23 MG/DL (ref 8–23)
BUN/CREAT SERPL: 27.4 (ref 7–25)
CALCIUM SERPL-MCNC: 9.6 MG/DL (ref 8.6–10.5)
CHLORIDE SERPL-SCNC: 88 MMOL/L (ref 98–107)
CHOLEST SERPL-MCNC: 221 MG/DL (ref 0–200)
CO2 SERPL-SCNC: 25.5 MMOL/L (ref 22–29)
CREAT SERPL-MCNC: 0.84 MG/DL (ref 0.57–1)
DIFFERENTIAL COMMENT: ABNORMAL
EGFRCR SERPLBLD CKD-EPI 2021: 69.9 ML/MIN/1.73
EOSINOPHIL # BLD MANUAL: 0.1 10*3/MM3 (ref 0–0.4)
EOSINOPHIL NFR BLD MANUAL: 1 % (ref 0.3–6.2)
ERYTHROCYTE [DISTWIDTH] IN BLOOD BY AUTOMATED COUNT: 15.5 % (ref 12.3–15.4)
GLOBULIN SER CALC-MCNC: 2.7 GM/DL
GLUCOSE SERPL-MCNC: 89 MG/DL (ref 65–99)
HBA1C MFR BLD: 5.6 % (ref 4.8–5.6)
HCT VFR BLD AUTO: 38.1 % (ref 34–46.6)
HDLC SERPL-MCNC: 95 MG/DL (ref 40–60)
HGB BLD-MCNC: 12.1 G/DL (ref 12–15.9)
LDLC SERPL CALC-MCNC: 108 MG/DL (ref 0–100)
LYMPHOCYTES # BLD MANUAL: 2.31 10*3/MM3 (ref 0.7–3.1)
LYMPHOCYTES NFR BLD MANUAL: 22.2 % (ref 19.6–45.3)
MCH RBC QN AUTO: 22.9 PG (ref 26.6–33)
MCHC RBC AUTO-ENTMCNC: 31.8 G/DL (ref 31.5–35.7)
MCV RBC AUTO: 72 FL (ref 79–97)
MONOCYTES # BLD MANUAL: 0.84 10*3/MM3 (ref 0.1–0.9)
MONOCYTES NFR BLD MANUAL: 8.1 % (ref 5–12)
NEUTROPHILS # BLD MANUAL: 7.14 10*3/MM3 (ref 1.7–7)
NEUTROPHILS NFR BLD MANUAL: 68.7 % (ref 42.7–76)
NRBC BLD AUTO-RTO: 0.2 /100 WBC (ref 0–0.2)
PLATELET # BLD AUTO: 346 10*3/MM3 (ref 140–450)
PLATELET BLD QL SMEAR: ABNORMAL
POTASSIUM SERPL-SCNC: 4.7 MMOL/L (ref 3.5–5.2)
PROT SERPL-MCNC: 7.1 G/DL (ref 6–8.5)
RBC # BLD AUTO: 5.29 10*6/MM3 (ref 3.77–5.28)
RBC MORPH BLD: ABNORMAL
SODIUM SERPL-SCNC: 126 MMOL/L (ref 136–145)
TRIGL SERPL-MCNC: 107 MG/DL (ref 0–150)
TSH SERPL DL<=0.005 MIU/L-ACNC: 1.48 UIU/ML (ref 0.27–4.2)
VLDLC SERPL CALC-MCNC: 18 MG/DL (ref 5–40)
WBC # BLD AUTO: 10.4 10*3/MM3 (ref 3.4–10.8)

## 2024-08-20 ENCOUNTER — TELEPHONE (OUTPATIENT)
Dept: INTERNAL MEDICINE | Facility: CLINIC | Age: 84
End: 2024-08-20

## 2024-08-22 ENCOUNTER — TELEPHONE (OUTPATIENT)
Dept: OBSTETRICS AND GYNECOLOGY | Age: 84
End: 2024-08-22
Payer: MEDICARE

## 2024-08-22 NOTE — TELEPHONE ENCOUNTER
Pt saw Fiona with an OV and US on 8/6.  Pt called today to update more PMB.  Discussed with Fiona and pt advised to be seen next week with another US and OV with Dr Craig. Pt added to schedule.  Pt voiced understanding.

## 2024-08-23 PROBLEM — E03.9 HYPOTHYROIDISM: Status: ACTIVE | Noted: 2024-08-23

## 2024-08-23 PROBLEM — J30.9 ALLERGIC RHINITIS: Status: ACTIVE | Noted: 2021-01-11

## 2024-08-23 RX ORDER — METHYLPREDNISOLONE 4 MG/1
TABLET ORAL
COMMUNITY
Start: 2024-08-21

## 2024-08-23 RX ORDER — EPINEPHRINE 0.3 MG/.3ML
0.3 INJECTION SUBCUTANEOUS AS NEEDED
COMMUNITY
Start: 2024-08-20

## 2024-08-28 ENCOUNTER — TELEPHONE (OUTPATIENT)
Dept: OBSTETRICS AND GYNECOLOGY | Age: 84
End: 2024-08-28

## 2024-08-28 ENCOUNTER — OFFICE VISIT (OUTPATIENT)
Dept: OBSTETRICS AND GYNECOLOGY | Age: 84
End: 2024-08-28
Payer: MEDICARE

## 2024-08-28 VITALS
WEIGHT: 125 LBS | BODY MASS INDEX: 23 KG/M2 | HEIGHT: 62 IN | DIASTOLIC BLOOD PRESSURE: 80 MMHG | SYSTOLIC BLOOD PRESSURE: 124 MMHG

## 2024-08-28 DIAGNOSIS — N95.0 PMB (POSTMENOPAUSAL BLEEDING): Primary | ICD-10-CM

## 2024-08-28 PROCEDURE — 99213 OFFICE O/P EST LOW 20 MIN: CPT | Performed by: OBSTETRICS & GYNECOLOGY

## 2024-08-28 NOTE — TELEPHONE ENCOUNTER
Patient seen in the office this morning.  She is getting her HRT from Dr. Olivares in [place].  Patient having postmenopausal bleeding and noted to have thickening of her endometrial lining when compared to an ultrasound that was done 6 months ago.  At the patient's request, I have called Dr. Olivares and discussed her bleeding and her ultrasound results with him.  Because he was in surgery and unavailable to do so himself, Dr. Olivares asked that I call the patient  and advise her to stop using her Climara patch.    I was unable to reach the patient, did leave her a voicemail with his instructions

## 2024-08-28 NOTE — PROGRESS NOTES
[facility]  Ricarda Hatch  : 1940  MRN: [id number]  CSN: [id number]    History and Physical    Subjective   Ricarda Hatch is a 83 y.o. year old  who presents for consultation about PMB.  The patient's theory is that it happened because she started taking testosterone replacement last October.  Bleeding did not start occurring until about 6 months later, however.  She was already on estrogen and progesterone when the testosterone was added, and she has continued the estrogen and progesterone.  Patient is seeing Kathleen for HRT.  Bleeding first occurred in March or April of this year, for just a few days.  It happened again in May.  And she has presently been bleeding for 10 days - it is enough that she is having to wear a light pad, and she is also having some slight cramping.    Patient feels like HRT has made her feel better.  She reports hair loss has stopped and says that she is sleeping much better.  She also notes significant improvement in urinary incontinence.    Although testosterone was added within the last year, she denies any other changes to her HRT.  Specifically, the patient denies any changes to the estrogen or progesterone components of her hormone replacement regimen.    No past medical history on file.  Past Surgical History:   Procedure Laterality Date    BILATERAL BREAST REDUCTION      LIVER BIOPSY      OOPHORECTOMY      REDUCTION MAMMAPLASTY       Social History    Tobacco Use      Smoking status: Former        Types: Cigarettes        Start date:         Quit date:         Years since quittin.6      Smokeless tobacco: Not on file      Current Outpatient Medications:     Alpha-Lipoic Acid 100 MG tablet, Take 200 mg by mouth., Disp: , Rfl:     Azelastine HCl 137 MCG/SPRAY solution, 2 SPRAYS BY NASAL ROUTE 2 (TWO) TIMES DAILY. USE IN EACH NOSTRIL AS DIRECTED, Disp: , Rfl:     calcium citrate (CALCITRATE) 950 (200 Ca) MG tablet, Take 2 tablets by mouth., Disp: , Rfl:      coenzyme Q10 100 MG capsule, Take 1 capsule by mouth Daily., Disp: , Rfl:     esomeprazole (NexIUM) 20 MG packet, Take 20 mg by mouth., Disp: , Rfl:     estradiol (CLIMARA) 0.025 MG/24HR patch, PLACE 1 PATCH ONTO THE SKIN EVERY 7 DAYS., Disp: , Rfl:     estradiol (ESTRACE) 0.1 MG/GM vaginal cream, PLACE 1 GRAM VAGINALLY TWICE A WEEK, Disp: , Rfl:     fluticasone (FLONASE) 50 MCG/ACT nasal spray, 1 spray., Disp: , Rfl:     levocetirizine (XYZAL) 5 MG tablet, Take 1 tablet by mouth., Disp: , Rfl:     liothyronine (CYTOMEL) 5 MCG tablet, TAKE 1 TABLET EVERY DAY BY ORAL ROUTE BEFORE MEAL(S)., Disp: , Rfl:     loperamide (IMODIUM A-D) 2 MG tablet, Take 0.5 tablets by mouth., Disp: , Rfl:     Lysine 500 MG capsule, Take  by mouth Daily., Disp: , Rfl:     memantine (NAMENDA) 10 MG tablet, , Disp: , Rfl:     metFORMIN (GLUCOPHAGE) 500 MG tablet, Take 1 tablet by mouth Daily With Breakfast., Disp: , Rfl:     methylPREDNISolone (MEDROL) 4 MG dose pack, , Disp: , Rfl:     NON FORMULARY, Testosterone cream, Disp: , Rfl:     olopatadine (PATADAY) 0.2 % solution ophthalmic solution, INSTILL 1 DROP INTO BOTH EYES EVERY DAY, Disp: , Rfl:     PEPPERMINT OIL PO, Take 1 capsule by mouth 2 (Two) Times a Day., Disp: , Rfl:     Progesterone (PROMETRIUM) 100 MG capsule, TAKE 1 CAPSULE BY MOUTH EVERY DAY AT BEDTIME FOR 30 DAYS, Disp: , Rfl:     propranolol (INDERAL) 20 MG tablet, Take 1 tablet by mouth Daily., Disp: , Rfl:     sodium chloride 1 g tablet, Take 1 tablet by mouth Daily., Disp: 30 tablet, Rfl: 0    traMADol (ULTRAM) 50 MG tablet, Take 1 tablet by mouth Every 6 (Six) Hours As Needed for Moderate Pain., Disp: , Rfl:     Allergies   Allergen Reactions    Epinephrine Anxiety, Other (See Comments) and Shortness Of Breath     Body spasms.    Fluconazole Nausea And Vomiting    Sulfa Antibiotics Hives, Nausea And Vomiting and Rash    Alcohol Other (See Comments)     GRAIN and Fruit based NOT topical, causes GI upset     GRAIN and  "Fruit based NOT topical, causes GI upset    Codeine Nausea And Vomiting    Magnesium Diarrhea     Tablets cause GI problems so she uses cream or oil       Family History   Problem Relation Age of Onset    Thalassemia Father     Osteoporosis Father      Review of Systems   Constitutional:  Negative for activity change and unexpected weight change.   Respiratory:  Negative for shortness of breath.    Cardiovascular:  Negative for chest pain.   Genitourinary:  Positive for enuresis (Better when patient is using vaginal estrogen), pelvic pain (Mild cramping associated with bleeding) and vaginal bleeding.   Neurological:  Negative for dizziness and headaches.   Psychiatric/Behavioral:  Positive for sleep disturbance (Also significantly improved with HRT).          Objective   /80   Ht 157.5 cm (62\")   Wt 56.7 kg (125 lb)   BMI 22.86 kg/m²     Physical Exam   Physical Exam  Vitals and nursing note reviewed.   Constitutional:       General: She is not in acute distress.     Appearance: She is well-developed.   HENT:      Head: Normocephalic and atraumatic.   Neck:      Thyroid: No thyromegaly.   Pulmonary:      Effort: Pulmonary effort is normal.   Musculoskeletal:      Cervical back: Normal range of motion.      Comments: Patient ambulates well.  Mobility limited only by the fact that she is wearing a back brace   Skin:     General: Skin is warm and dry.   Neurological:      Mental Status: She is alert and oriented to person, place, and time.   Psychiatric:         Mood and Affect: Mood normal.         Behavior: Behavior normal.         Thought Content: Thought content normal.         Judgment: Judgment normal.         Labs  Lab Results   Component Value Date     08/13/2024    HGB 12.1 08/13/2024    HCT 38.1 08/13/2024    WBC 10.40 08/13/2024     (L) 08/26/2024    K 5.0 08/26/2024    CL 91 (L) 08/26/2024    CO2 25.5 08/26/2024    BUN 17 08/26/2024    CREATININE 0.95 08/26/2024    GLUCOSE 88 " 08/26/2024    ALBUMIN 4.4 08/13/2024    CALCIUM 9.3 08/26/2024    AST 17 08/13/2024    ALT 10 08/13/2024    BILITOT 0.4 08/13/2024        Assessment & Plan    Diagnoses and all orders for this visit:    1. PMB (postmenopausal bleeding) (Primary): Patient with postmenopausal bleeding which began in March of this year.  At that time it was just a few days.  She had bleeding again in May.  At present, the patient has been bleeding for 10 straight days.  The patient's endometrial lining in April was 1.6 mm.  In August her endometrial lining was 1.9 mm.  Today, the patient's endometrial lining is 3.4 mm.  The difference between the first 2 ultrasounds was negligible, and I have explained to the patient that this may have been deemed insignificant at the time.  Today, however, the patient's endometrial lining has clearly doubled.  We have discussed the opposing roles of estrogen and progesterone, in regard to proliferation of the endometrial lining.  I feel like the patient's hormone replacement therapy is probably favoring proliferation because she is getting too much estrogen in respect to the amount of progesterone that she is taking.  The patient's unwilling to consider stopping all of her HRT because it is made too many improvements in her quality of life.  The patient says that her sleep is improved, her hair loss has stopped, and she overall just feels better.  She is most impressed with the improvement in her incontinence, which she says has been significant.  The patient's HRT is being managed by Dr. Olivares in [place].  I have assured the patient that I will get in touch with him so that we can discuss her ultrasound findings -that he will still be in charge of managing her hormone dose.  The patient wants Dr. Olivares managing her hormones, but very much wants me to call him to discuss her ultrasound findings.  I have placed a call to Dr. Olivares's office, but he was unavailable.  I have been assured that he will  return my phone call later today or tomorrow.    I feel certain that adjustments will be made to the patient's HRT.  Since her endometrial lining is still less than 4 mm, the patient will return to this office in 3 months for follow-up -repeat ultrasound at that time.  We have discussed the fact that at some point she may need a hysteroscopy with D&C       Cheri Craig MD  8/28/2024  08:58 CDT

## 2024-08-29 ENCOUNTER — OFFICE VISIT (OUTPATIENT)
Age: 84
End: 2024-08-29
Payer: MEDICARE

## 2024-08-29 VITALS
TEMPERATURE: 97.8 F | OXYGEN SATURATION: 96 % | SYSTOLIC BLOOD PRESSURE: 128 MMHG | BODY MASS INDEX: 22.86 KG/M2 | DIASTOLIC BLOOD PRESSURE: 78 MMHG | HEART RATE: 68 BPM | RESPIRATION RATE: 20 BRPM | WEIGHT: 125 LBS

## 2024-08-29 DIAGNOSIS — T49.95XA SKIN IRRITATION DUE TO TOPICAL AGENT: Primary | ICD-10-CM

## 2024-08-29 DIAGNOSIS — R23.8 SKIN IRRITATION DUE TO TOPICAL AGENT: Primary | ICD-10-CM

## 2024-08-29 PROCEDURE — 1036F TOBACCO NON-USER: CPT

## 2024-08-29 PROCEDURE — G8427 DOCREV CUR MEDS BY ELIG CLIN: HCPCS

## 2024-08-29 PROCEDURE — 1090F PRES/ABSN URINE INCON ASSESS: CPT

## 2024-08-29 PROCEDURE — 99212 OFFICE O/P EST SF 10 MIN: CPT

## 2024-08-29 PROCEDURE — 1123F ACP DISCUSS/DSCN MKR DOCD: CPT

## 2024-08-29 PROCEDURE — G8420 CALC BMI NORM PARAMETERS: HCPCS

## 2024-08-29 PROCEDURE — G8400 PT W/DXA NO RESULTS DOC: HCPCS

## 2024-08-29 RX ORDER — TESTOSTERONE 25 MG/2.5G
5 GEL TRANSDERMAL DAILY
COMMUNITY

## 2024-08-29 ASSESSMENT — ENCOUNTER SYMPTOMS
NAUSEA: 0
ABDOMINAL PAIN: 0
VOMITING: 0
DIARRHEA: 0
COLOR CHANGE: 1

## 2024-08-29 NOTE — PROGRESS NOTES
Abdomen is soft.   Musculoskeletal:         General: Normal range of motion.      Cervical back: Normal range of motion.   Skin:     General: Skin is warm and dry.      Capillary Refill: Capillary refill takes less than 2 seconds.      Findings: Wound (localized to LLQ) present.      Comments: See attached media.   Neurological:      General: No focal deficit present.      Mental Status: She is alert.   Psychiatric:         Mood and Affect: Mood normal.         /78   Pulse 68   Temp 97.8 °F (36.6 °C) (Temporal)   Resp 20   Wt 56.7 kg (125 lb)   SpO2 96%   BMI 22.86 kg/m²     :Assessment   Assessment & Plan    Diagnosis Orders   1. Skin irritation due to topical agent            :Plan   Discussed with patient no apparent abscess palpated.  Discussed area of redness is from irritation from estrogen patch being in contact with skin for a long duration.    -May apply over-the-counter moisturizing lotion to area.  -May apply over-the-counter Neosporin as needed.  -Do not apply estrogen patch over area until healed.  -Avoid scratching or picking at area.  -Cleanse area with antimicrobial soap and water.  -Pat dry.  -Monitor for any worsening of area or development of fever, chills, body aches, or drainage.  -Return to the clinic or follow up with PCP if symptoms worsen or fail to improve.       Patient provided educational materials- see patient instructions.  Discussed administration, benefit, and side effects of any prescribed or OTC medications.  All patient questions answered appropriately.  Patient voiced understanding.     Return if symptoms worsen or fail to improve.    Urgent Care evaluation today is not a substitute for PCP visit. Follow up care is the responsibility of the patient to discuss and review this Urgent Care visit.    No orders of the defined types were placed in this encounter.      No results found for this visit on 08/29/24.    No orders of the defined types were placed in this  encounter.       Patient Instructions   -May apply over-the-counter moisturizing lotion to area.  -May apply over-the-counter Neosporin as needed.  -Do not apply estrogen patch over area until healed.  -Avoid scratching or picking at area.  -Cleanse area with antimicrobial soap and water.  -Pat dry.  -Monitor for any worsening of area or development of fever, chills, body aches, or drainage.  -Return to the clinic or follow up with PCP if symptoms worsen or fail to improve.      Electronically signed by SALTY Jordan CNP on 8/29/2024 at 8:36 AM

## 2024-08-29 NOTE — PATIENT INSTRUCTIONS
-May apply over-the-counter moisturizing lotion to area.  -May apply over-the-counter Neosporin as needed.  -Do not apply estrogen patch over area until healed.  -Avoid scratching or picking at area.  -Cleanse area with antimicrobial soap and water.  -Pat dry.  -Monitor for any worsening of area or development of fever, chills, body aches, or drainage.  -Return to the clinic or follow up with PCP if symptoms worsen or fail to improve.

## 2024-09-14 DIAGNOSIS — G25.0 ESSENTIAL TREMOR: ICD-10-CM
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
## 2024-09-14 DIAGNOSIS — F09 COGNITIVE DISORDER: ICD-10-CM

## 2024-09-14 DIAGNOSIS — E87.1 ACUTE HYPONATREMIA: ICD-10-CM

## 2024-09-16 RX ORDER — BETAMETHASONE DIPROPIONATE 0.05 %
OINTMENT (GRAM) TOPICAL
Qty: 45 G | OUTPATIENT
Start: 2024-09-16

## 2024-09-16 RX ORDER — SODIUM CHLORIDE 1 G/1
1 TABLET ORAL DAILY
Qty: 60 TABLET | Refills: 0 | Status: SHIPPED | OUTPATIENT
Start: 2024-09-16

## 2024-09-16 RX ORDER — MEMANTINE HYDROCHLORIDE 10 MG/1
10 TABLET ORAL 2 TIMES DAILY
Qty: 180 TABLET | Refills: 1 | Status: SHIPPED | OUTPATIENT
Start: 2024-09-16

## 2024-09-16 RX ORDER — PROPRANOLOL HCL 20 MG
20 TABLET ORAL 2 TIMES DAILY
Qty: 180 TABLET | Refills: 1 | Status: SHIPPED | OUTPATIENT
Start: 2024-09-16

## 2024-09-17 DIAGNOSIS — H66.001 NON-RECURRENT ACUTE SUPPURATIVE OTITIS MEDIA OF RIGHT EAR WITHOUT SPONTANEOUS RUPTURE OF TYMPANIC MEMBRANE: ICD-10-CM

## 2024-09-17 DIAGNOSIS — R05.1 ACUTE COUGH: ICD-10-CM

## 2024-09-17 DIAGNOSIS — J02.9 SORE THROAT: ICD-10-CM

## 2024-09-17 RX ORDER — FLUTICASONE PROPIONATE 50 MCG
SPRAY, SUSPENSION (ML) NASAL
Refills: 1 | OUTPATIENT
Start: 2024-09-17

## 2024-09-30 DIAGNOSIS — R73.03 PREDIABETES: ICD-10-CM

## 2024-09-30 NOTE — TELEPHONE ENCOUNTER
Paulette Sutton called to request a refill on her medication.      Last office visit : 7/22/2024   Next office visit : 12/12/2024     Requested Prescriptions     Pending Prescriptions Disp Refills    metFORMIN (GLUCOPHAGE) 500 MG tablet [Pharmacy Med Name: METFORMIN  MG TABLET] 90 tablet 1     Sig: TAKE 1 TABLET BY MOUTH EVERY DAY WITH BREAKFAST            Manasa Cochran LPN

## 2024-10-01 ENCOUNTER — TELEPHONE (OUTPATIENT)
Dept: INTERNAL MEDICINE | Facility: CLINIC | Age: 84
End: 2024-10-01
Payer: MEDICARE

## 2024-10-01 DIAGNOSIS — R41.3 MEMORY LOSS: Primary | ICD-10-CM

## 2024-10-01 NOTE — TELEPHONE ENCOUNTER
Patient is requesting a referral to GALINDO Lo at [facility] for her memory loss.

## 2024-10-22 ENCOUNTER — OFFICE VISIT (OUTPATIENT)
Age: 84
End: 2024-10-22
Payer: MEDICARE

## 2024-10-22 VITALS — HEIGHT: 62 IN | BODY MASS INDEX: 23 KG/M2 | WEIGHT: 125 LBS

## 2024-10-22 DIAGNOSIS — M19.012 PRIMARY OSTEOARTHRITIS OF LEFT SHOULDER: Primary | ICD-10-CM

## 2024-10-22 DIAGNOSIS — M41.25 OTHER IDIOPATHIC SCOLIOSIS, THORACOLUMBAR REGION: ICD-10-CM

## 2024-10-22 PROCEDURE — 99213 OFFICE O/P EST LOW 20 MIN: CPT | Performed by: PHYSICIAN ASSISTANT

## 2024-10-22 PROCEDURE — 1090F PRES/ABSN URINE INCON ASSESS: CPT | Performed by: PHYSICIAN ASSISTANT

## 2024-10-22 PROCEDURE — G8400 PT W/DXA NO RESULTS DOC: HCPCS | Performed by: PHYSICIAN ASSISTANT

## 2024-10-22 PROCEDURE — G8427 DOCREV CUR MEDS BY ELIG CLIN: HCPCS | Performed by: PHYSICIAN ASSISTANT

## 2024-10-22 PROCEDURE — G8420 CALC BMI NORM PARAMETERS: HCPCS | Performed by: PHYSICIAN ASSISTANT

## 2024-10-22 PROCEDURE — 1123F ACP DISCUSS/DSCN MKR DOCD: CPT | Performed by: PHYSICIAN ASSISTANT

## 2024-10-22 PROCEDURE — 1036F TOBACCO NON-USER: CPT | Performed by: PHYSICIAN ASSISTANT

## 2024-10-22 PROCEDURE — G8484 FLU IMMUNIZE NO ADMIN: HCPCS | Performed by: PHYSICIAN ASSISTANT

## 2024-10-22 NOTE — PROGRESS NOTES
Jolene Rodrigues MD   PROGESTERONE PO by Other route    Alan Slade MD   melatonin 5 MG TBDP disintegrating tablet Take 2 tablets by mouth nightly 10/26/23   Alexi Grubbs MD   atorvastatin (LIPITOR) 80 MG tablet Take 0.5 tablets by mouth nightly 10/26/23   Alexi Grubbs MD   tretinoin (RETIN-A) 0.05 % cream APPLY PEA-SIZED AMOUNT TO ENTIRE FACE NIGHTLY.. 3/9/23   Alan Slade MD   Lysine 500 MG CAPS Take by mouth daily    Alan Slade MD   loperamide (IMODIUM A-D) 2 MG tablet Take 0.5 tablets by mouth    Alan Slade MD   levocetirizine (XYZAL) 5 MG tablet Take 1 tablet by mouth    Alan Slade MD   esomeprazole Magnesium (NEXIUM) 20 MG PACK Take 1 packet by mouth    Alan Slade MD   dextran 70-hypromellose (TEARS NATURALE) 0.1-0.3 % SOLN opthalmic solution Apply 1 drop to eye    Alan Slade MD   coenzyme Q10 100 MG CAPS capsule Take 1 capsule by mouth daily    Alan Slade MD   vitamin D (CHOLECALCIFEROL) 25 MCG (1000 UT) TABS tablet Take 1 tablet by mouth    Alan Slade MD   Alpha-Lipoic Acid 100 MG TABS Take 200 mg by mouth every morning    Alan Slade MD   calcium citrate (CALCITRATE) 950 (200 Ca) MG tablet Take 9.5 tablets by mouth    Alan Slade MD   Peppermint Oil 90 MG CPCR Take by mouth 2 times daily    Alan Slade MD   ezetimibe (ZETIA) 10 MG tablet  3/15/22   Alan Slade MD       Allergies: Sulfa antibiotics    System Neg/Pos Details   Constitutional negative   Fatigue and Fever.   Respiratory negative   Cough and Dyspnea.   Cardio negative   Chest pain.   GI negative   Abdominal pain and Vomiting.    negative   Urinary incontinence.   Neuro negative   Headache.   Psych negative   Psychiatric symptoms.       PHYSICAL EXAM:    There were no vitals taken for this visit.    General:  Appears stated age, no distress.  Orientation:  Alert and oriented to time, place, and

## 2024-10-29 ENCOUNTER — TELEPHONE (OUTPATIENT)
Age: 84
End: 2024-10-29

## 2024-10-31 ENCOUNTER — TELEPHONE (OUTPATIENT)
Dept: INTERNAL MEDICINE | Facility: CLINIC | Age: 84
End: 2024-10-31

## 2024-10-31 NOTE — TELEPHONE ENCOUNTER
"    Caller: Ricarda Hatch \"AGUEDA\"    Relationship: Self    Best call back number: [phone]     What medication are you requesting: FLAGYL     What are your current symptoms: RASH ON BOTTOM     How long have you been experiencing symptoms: TEN DAYS     Have you had these symptoms before:    [x] Yes  [] No    Have you been treated for these symptoms before:   [x] Yes  [] No    If a prescription is needed, what is your preferred pharmacy and phone number: [id number] - [place] - [address] - [phone] [facility] [phone] FX     Additional notes:    PLEASE FOLLOW-UP WITH PATIENT REGARDING THIS REQUEST.   "

## 2024-11-01 NOTE — TELEPHONE ENCOUNTER
Called patient to schedule appointment with Dr. Valentin; however his schedule is very full.  Patient said this is a recurring problem that happens approximately every 3 months and has been for years. She said if Dr. Valentin wants to check with [facility] pharmacist, they can let him know this has been recurring for a long time. She also said this problem contributes to her dementia if not treated. I told patient someone from the office would call her the first of the week.

## 2024-11-04 ENCOUNTER — OFFICE VISIT (OUTPATIENT)
Dept: INTERNAL MEDICINE | Facility: CLINIC | Age: 84
End: 2024-11-04
Payer: MEDICARE

## 2024-11-04 VITALS
HEIGHT: 62 IN | WEIGHT: 121 LBS | HEART RATE: 81 BPM | DIASTOLIC BLOOD PRESSURE: 56 MMHG | BODY MASS INDEX: 22.26 KG/M2 | SYSTOLIC BLOOD PRESSURE: 102 MMHG | OXYGEN SATURATION: 95 % | TEMPERATURE: 98.1 F

## 2024-11-04 DIAGNOSIS — B00.9 HSV INFECTION: Primary | ICD-10-CM

## 2024-11-04 DIAGNOSIS — E87.1 HYPONATREMIA: ICD-10-CM

## 2024-11-04 PROCEDURE — 1126F AMNT PAIN NOTED NONE PRSNT: CPT

## 2024-11-04 PROCEDURE — 1159F MED LIST DOCD IN RCRD: CPT

## 2024-11-04 PROCEDURE — 1160F RVW MEDS BY RX/DR IN RCRD: CPT

## 2024-11-04 PROCEDURE — 99213 OFFICE O/P EST LOW 20 MIN: CPT

## 2024-11-04 RX ORDER — VALACYCLOVIR HYDROCHLORIDE 1 G/1
1000 TABLET, FILM COATED ORAL DAILY
Qty: 10 TABLET | Refills: 3 | Status: SHIPPED | OUTPATIENT
Start: 2024-11-04

## 2024-11-04 NOTE — PROGRESS NOTES
Subjective   Ricarda Hatch is a 84 y.o. female.   Chief Complaint   Patient presents with    Rash     Oct 8, on R buttock, was taking an RX of Flaygl that she has (stated standing script).  States that the rash is concerning as it can lead to dementia.       History of Present Illness   History of Present Illness  The patient is an 84-year-old female presenting to the office today with complaints of a rash on her bottom.    She reports experiencing herpes outbreaks approximately 8 to 9 years ago. These outbreaks occur roughly every 3 months and are accompanied by sharp, burning pain. She had a standing prescription for Valtrex, which she needs to renew. She has been taking Valtrex 500 mg twice daily for 10 days, she feels that the rash she was experiencing has almost entirely cleared up at this point.    She also mentions a possible allergy to Flagyl, as it induces vomiting.    Additionally, she experiences joint pain and expresses concern about a potential link to dementia.    She maintains a high level of hydration, consuming 3 to 4 bottles of water daily. Despite this, she often feels sluggish and uncoordinated. She was prescribed salt tablets, which have provided some relief. She does not use Epsom salt or pink salt. She recalls consuming a lot of processed food 20 years ago but has since started preparing her own meals. She continues to take the salt tablets.    She has osteoporosis.       The following portions of the patient's history were reviewed and updated as appropriate: allergies, current medications, past family history, past medical history, past social history, past surgical history and problem list.    Review of Systems    Objective   History reviewed. No pertinent past medical history.   Past Surgical History:   Procedure Laterality Date    BILATERAL BREAST REDUCTION      LIVER BIOPSY      OOPHORECTOMY      REDUCTION MAMMAPLASTY          Current Outpatient Medications:     Alpha-Lipoic Acid 100 MG  tablet, Take 200 mg by mouth., Disp: , Rfl:     Azelastine HCl 137 MCG/SPRAY solution, 2 SPRAYS BY NASAL ROUTE 2 (TWO) TIMES DAILY. USE IN EACH NOSTRIL AS DIRECTED, Disp: , Rfl:     calcium citrate (CALCITRATE) 950 (200 Ca) MG tablet, Take 2 tablets by mouth., Disp: , Rfl:     coenzyme Q10 100 MG capsule, Take 1 capsule by mouth Daily., Disp: , Rfl:     esomeprazole (NexIUM) 20 MG packet, Take 20 mg by mouth., Disp: , Rfl:     estradiol (CLIMARA) 0.025 MG/24HR patch, PLACE 1 PATCH ONTO THE SKIN EVERY 7 DAYS., Disp: , Rfl:     estradiol (ESTRACE) 0.1 MG/GM vaginal cream, PLACE 1 GRAM VAGINALLY TWICE A WEEK, Disp: , Rfl:     fluticasone (FLONASE) 50 MCG/ACT nasal spray, 1 spray., Disp: , Rfl:     levocetirizine (XYZAL) 5 MG tablet, Take 1 tablet by mouth., Disp: , Rfl:     liothyronine (CYTOMEL) 5 MCG tablet, TAKE 1 TABLET EVERY DAY BY ORAL ROUTE BEFORE MEAL(S)., Disp: , Rfl:     loperamide (IMODIUM A-D) 2 MG tablet, Take 0.5 tablets by mouth., Disp: , Rfl:     Lysine 500 MG capsule, Take  by mouth Daily., Disp: , Rfl:     memantine (NAMENDA) 10 MG tablet, , Disp: , Rfl:     metFORMIN (GLUCOPHAGE) 500 MG tablet, Take 1 tablet by mouth Daily With Breakfast., Disp: , Rfl:     NON FORMULARY, Testosterone cream, Disp: , Rfl:     olopatadine (PATADAY) 0.2 % solution ophthalmic solution, INSTILL 1 DROP INTO BOTH EYES EVERY DAY, Disp: , Rfl:     PEPPERMINT OIL PO, Take 1 capsule by mouth 2 (Two) Times a Day., Disp: , Rfl:     Progesterone (PROMETRIUM) 100 MG capsule, TAKE 1 CAPSULE BY MOUTH EVERY DAY AT BEDTIME FOR 30 DAYS, Disp: , Rfl:     propranolol (INDERAL) 20 MG tablet, Take 1 tablet by mouth Daily., Disp: , Rfl:     sodium chloride 1 g tablet, TAKE 1 TABLET BY MOUTH EVERY DAY, Disp: 60 tablet, Rfl: 0    traMADol (ULTRAM) 50 MG tablet, Take 1 tablet by mouth Every 6 (Six) Hours As Needed for Moderate Pain., Disp: , Rfl:     valACYclovir (Valtrex) 1000 MG tablet, Take 1 tablet by mouth Daily., Disp: 10 tablet, Rfl: 3     "  /56 (BP Location: Left arm, Patient Position: Sitting, Cuff Size: Adult)   Pulse 81   Temp 98.1 °F (36.7 °C) (Temporal)   Ht 157.5 cm (62.01\")   Wt 54.9 kg (121 lb)   SpO2 95%   BMI 22.13 kg/m²      Body mass index is 22.13 kg/m².  BMI is within normal parameters. No other follow-up for BMI required.       Physical Exam  Vitals and nursing note reviewed.   Constitutional:       General: She is not in acute distress.     Appearance: Normal appearance. She is not ill-appearing, toxic-appearing or diaphoretic.   HENT:      Head: Normocephalic and atraumatic.   Eyes:      Extraocular Movements: Extraocular movements intact.      Conjunctiva/sclera: Conjunctivae normal.      Pupils: Pupils are equal, round, and reactive to light.   Cardiovascular:      Rate and Rhythm: Normal rate and regular rhythm.      Pulses: Normal pulses.      Heart sounds: Murmur heard.   Pulmonary:      Effort: Pulmonary effort is normal.      Breath sounds: Normal breath sounds.   Abdominal:      General: Bowel sounds are normal.      Palpations: Abdomen is soft.   Musculoskeletal:      Cervical back: Normal range of motion and neck supple.   Skin:     General: Skin is warm and dry.             Comments: Area of concern assessed as above, there is still evidence of very slight erythema and scaly skin, otherwise rash appears to be drying/healing appropriately   Neurological:      General: No focal deficit present.      Mental Status: She is alert and oriented to person, place, and time. Mental status is at baseline.   Psychiatric:         Mood and Affect: Mood normal.         Behavior: Behavior normal.         Thought Content: Thought content normal.         Judgment: Judgment normal.               Assessment & Plan   Diagnoses and all orders for this visit:    1. HSV infection (Primary)  -     valACYclovir (Valtrex) 1000 MG tablet; Take 1 tablet by mouth Daily.  Dispense: 10 tablet; Refill: 3    2. Hyponatremia  -     Basic " metabolic panel                 Assessment & Plan  1. Herpes outbreak.  The remnants of the herpes outbreak are visible, but it appears to be healing well. She reports flare-ups approximately every 3 months, with significant sharp, burning pain. A prescription for Valtrex 1000 mg once daily for 10 days will be provided, with three additional refills. This prescription will be valid for a year. She is advised to inform the office if the condition persists despite medication.    2. Hyponatremia.  Her sodium levels were slightly low in the previous lab results. She reports feeling consistently dehydrated despite adequate fluid intake and taking salt tablets. She is advised to add a small amount of Epsom salt to her water, if tolerable. A consultation with Dr. Douglas will be arranged to discuss the possibility of increasing her sodium tablet dosage. She is also advised to take 1.5 sodium tablets daily for a few days to assess improvement. A repeat BMP will be conducted to monitor her sodium levels. The results will be reviewed, and further recommendations will be made based on the findings.      Patient or patient representative verbalized consent for the use of Ambient Listening during the visit with  GALINDO Hernandez for chart documentation. 11/4/2024  15:34 CST    Please note that portions of this note were completed with a voice recognition program.     Electronically signed by GALINDO Hernandez, 11/04/24, 15:34 CST.

## 2024-11-05 LAB
BUN SERPL-MCNC: 19 MG/DL (ref 8–23)
BUN/CREAT SERPL: 23.8 (ref 7–25)
CALCIUM SERPL-MCNC: 9.7 MG/DL (ref 8.6–10.5)
CHLORIDE SERPL-SCNC: 96 MMOL/L (ref 98–107)
CO2 SERPL-SCNC: 26.9 MMOL/L (ref 22–29)
CREAT SERPL-MCNC: 0.8 MG/DL (ref 0.57–1)
EGFRCR SERPLBLD CKD-EPI 2021: 73.7 ML/MIN/1.73
GLUCOSE SERPL-MCNC: 91 MG/DL (ref 65–99)
POTASSIUM SERPL-SCNC: 4.7 MMOL/L (ref 3.5–5.2)
SODIUM SERPL-SCNC: 134 MMOL/L (ref 136–145)

## 2024-11-05 NOTE — PROGRESS NOTES
Please inform her that her sodium level although slightly low still is continuing to improve and is currently at 134, I relayed results to Dr. Valentin and he recommends continuing with the 1 tablet of sodium daily as she has been as well as incorporating salt to foods that she prepares.

## 2024-11-19 ENCOUNTER — OFFICE VISIT (OUTPATIENT)
Dept: NEUROLOGY | Age: 84
End: 2024-11-19
Payer: MEDICARE

## 2024-11-19 VITALS
HEART RATE: 66 BPM | SYSTOLIC BLOOD PRESSURE: 119 MMHG | BODY MASS INDEX: 23 KG/M2 | HEIGHT: 62 IN | DIASTOLIC BLOOD PRESSURE: 68 MMHG | WEIGHT: 125 LBS | RESPIRATION RATE: 16 BRPM

## 2024-11-19 DIAGNOSIS — E55.9 VITAMIN D DEFICIENCY: ICD-10-CM

## 2024-11-19 DIAGNOSIS — G31.84 MILD COGNITIVE IMPAIRMENT: ICD-10-CM

## 2024-11-19 DIAGNOSIS — G31.84 MILD COGNITIVE IMPAIRMENT: Primary | ICD-10-CM

## 2024-11-19 LAB
25(OH)D3 SERPL-MCNC: 109.3 NG/ML
ALBUMIN SERPL-MCNC: 4.2 G/DL (ref 3.5–5.2)
ALP SERPL-CCNC: 61 U/L (ref 35–104)
ALT SERPL-CCNC: 10 U/L (ref 5–33)
ANION GAP SERPL CALCULATED.3IONS-SCNC: 12 MMOL/L (ref 7–19)
AST SERPL-CCNC: 18 U/L (ref 5–32)
BASOPHILS # BLD: 0 K/UL (ref 0–0.2)
BASOPHILS NFR BLD: 0.6 % (ref 0–1)
BILIRUB SERPL-MCNC: 0.4 MG/DL (ref 0.2–1.2)
BUN SERPL-MCNC: 21 MG/DL (ref 8–23)
CALCIUM SERPL-MCNC: 9.4 MG/DL (ref 8.8–10.2)
CHLORIDE SERPL-SCNC: 95 MMOL/L (ref 98–111)
CO2 SERPL-SCNC: 28 MMOL/L (ref 22–29)
CREAT SERPL-MCNC: 0.8 MG/DL (ref 0.5–0.9)
CRP SERPL HS-MCNC: <0.3 MG/DL (ref 0–0.5)
EOSINOPHIL # BLD: 0.1 K/UL (ref 0–0.6)
EOSINOPHIL NFR BLD: 1.1 % (ref 0–5)
ERYTHROCYTE [DISTWIDTH] IN BLOOD BY AUTOMATED COUNT: 15.9 % (ref 11.5–14.5)
ERYTHROCYTE [SEDIMENTATION RATE] IN BLOOD BY WESTERGREN METHOD: 9 MM/HR (ref 0–25)
GLUCOSE SERPL-MCNC: 103 MG/DL (ref 70–99)
HCT VFR BLD AUTO: 34.8 % (ref 37–47)
HGB BLD-MCNC: 11.5 G/DL (ref 12–16)
HIV-1 P24 AG: NORMAL
HIV1+2 AB SERPLBLD QL IA.RAPID: NORMAL
IMM GRANULOCYTES # BLD: 0 K/UL
LYMPHOCYTES # BLD: 2.6 K/UL (ref 1.1–4.5)
LYMPHOCYTES NFR BLD: 42.5 % (ref 20–40)
MCH RBC QN AUTO: 23.6 PG (ref 27–31)
MCHC RBC AUTO-ENTMCNC: 33 G/DL (ref 33–37)
MCV RBC AUTO: 71.5 FL (ref 81–99)
MONOCYTES # BLD: 0.4 K/UL (ref 0–0.9)
MONOCYTES NFR BLD: 6.3 % (ref 0–10)
NEUTROPHILS # BLD: 3 K/UL (ref 1.5–7.5)
NEUTS SEG NFR BLD: 49.2 % (ref 50–65)
PLATELET # BLD AUTO: 284 K/UL (ref 130–400)
PMV BLD AUTO: 9.7 FL (ref 9.4–12.3)
POTASSIUM SERPL-SCNC: 4.1 MMOL/L (ref 3.5–5)
PROT SERPL-MCNC: 7 G/DL (ref 6.4–8.3)
RBC # BLD AUTO: 4.87 M/UL (ref 4.2–5.4)
SODIUM SERPL-SCNC: 135 MMOL/L (ref 136–145)
TSH SERPL DL<=0.005 MIU/L-ACNC: 1.62 UIU/ML (ref 0.27–4.2)
VIT B12 SERPL-MCNC: 907 PG/ML (ref 232–1245)
WBC # BLD AUTO: 6.2 K/UL (ref 4.8–10.8)

## 2024-11-19 PROCEDURE — 1160F RVW MEDS BY RX/DR IN RCRD: CPT | Performed by: NURSE PRACTITIONER

## 2024-11-19 PROCEDURE — G8427 DOCREV CUR MEDS BY ELIG CLIN: HCPCS | Performed by: NURSE PRACTITIONER

## 2024-11-19 PROCEDURE — 99204 OFFICE O/P NEW MOD 45 MIN: CPT | Performed by: NURSE PRACTITIONER

## 2024-11-19 PROCEDURE — G8484 FLU IMMUNIZE NO ADMIN: HCPCS | Performed by: NURSE PRACTITIONER

## 2024-11-19 PROCEDURE — 1123F ACP DISCUSS/DSCN MKR DOCD: CPT | Performed by: NURSE PRACTITIONER

## 2024-11-19 PROCEDURE — G8420 CALC BMI NORM PARAMETERS: HCPCS | Performed by: NURSE PRACTITIONER

## 2024-11-19 PROCEDURE — 1036F TOBACCO NON-USER: CPT | Performed by: NURSE PRACTITIONER

## 2024-11-19 PROCEDURE — 1159F MED LIST DOCD IN RCRD: CPT | Performed by: NURSE PRACTITIONER

## 2024-11-19 PROCEDURE — G8400 PT W/DXA NO RESULTS DOC: HCPCS | Performed by: NURSE PRACTITIONER

## 2024-11-19 PROCEDURE — 1090F PRES/ABSN URINE INCON ASSESS: CPT | Performed by: NURSE PRACTITIONER

## 2024-11-19 NOTE — PROGRESS NOTES

## 2024-11-21 LAB — RPR SER QL: NORMAL

## 2024-11-22 LAB
ARSENIC BLD-MCNC: <10 UG/L
CADMIUM BLD-MCNC: <1 UG/L
LEAD BLD-MCNC: <2 UG/DL
Lab: NORMAL
MERCURY BLD-MCNC: <2.5 UG/L
REPORT: NORMAL
THIS TEST SENT TO: NORMAL

## 2024-11-23 LAB — VIT B1 BLD-MCNC: 232 NMOL/L (ref 70–180)

## 2024-12-03 ENCOUNTER — HOSPITAL ENCOUNTER (OUTPATIENT)
Dept: MRI IMAGING | Age: 84
Discharge: HOME | End: 2024-12-03
Payer: MEDICARE

## 2024-12-03 DIAGNOSIS — G31.84 MILD COGNITIVE IMPAIRMENT: ICD-10-CM

## 2024-12-03 PROCEDURE — A9577 INJ MULTIHANCE: HCPCS | Performed by: NURSE PRACTITIONER

## 2024-12-03 PROCEDURE — 70553 MRI BRAIN STEM W/O & W/DYE: CPT

## 2024-12-03 PROCEDURE — 6360000004 HC RX CONTRAST MEDICATION: Performed by: NURSE PRACTITIONER

## 2024-12-03 RX ADMIN — GADOBENATE DIMEGLUMINE 10 ML: 529 INJECTION, SOLUTION INTRAVENOUS at 11:50

## 2024-12-12 RX ORDER — AZELASTINE HYDROCHLORIDE 137 UG/1
2 SPRAY, METERED NASAL 2 TIMES DAILY
Qty: 30 ML | Refills: 0 | Status: SHIPPED | OUTPATIENT
Start: 2024-12-12

## 2024-12-12 NOTE — TELEPHONE ENCOUNTER
Paulette Sutton called to request a refill on her medication.      Last office visit : 2024   Next office visit : Visit date not found     Requested Prescriptions     Pending Prescriptions Disp Refills    Azelastine HCl 137 MCG/SPRAY SOLN [Pharmacy Med Name: AZELASTINE 0.1% (137 MCG) SPRY]  5     Si SPRAYS BY NASAL ROUTE 2 (TWO) TIMES DAILY. USE IN EACH NOSTRIL AS DIRECTED            Manasa Cochran LPN

## 2025-01-07 RX ORDER — AZELASTINE HYDROCHLORIDE 137 UG/1
SPRAY, METERED NASAL
Refills: 1 | OUTPATIENT
Start: 2025-01-07

## 2025-01-13 RX ORDER — AZELASTINE HYDROCHLORIDE 137 UG/1
SPRAY, METERED NASAL
OUTPATIENT
Start: 2025-01-13

## 2025-01-22 ENCOUNTER — TELEPHONE (OUTPATIENT)
Dept: INTERNAL MEDICINE | Facility: CLINIC | Age: 85
End: 2025-01-22

## 2025-01-22 NOTE — TELEPHONE ENCOUNTER
Called pt and informed this will have to be discussed at the time of her visit and if any lab is indicated, will draw after visit.  She voiced understanding.

## 2025-01-22 NOTE — TELEPHONE ENCOUNTER
"    Caller: Ricarda Hatch \"AGUEDA\"    Relationship: Self    Best call back number:     [phone] (Mobile), REQUESTING A CALLBACK WHEN THE LAB ORDERS ARE IN       What orders are you requesting (i.e. lab or imaging): THE PATIENT STATES THAT SHE WOULD LIKE TO GET: BLOOD CANCER SCREENING, C-REACTIVE PROTEIN, HEMOGLOBIN.    In what timeframe would the patient need to come in: THE PATIENT STATES THAT SHE WOULD LIKE TO GET THE LABS ON 2/14/25 AT 1:30 PM.    Where will you receive your lab/imaging services: [facility]    THE PATIENT IS SCHEDULED ON 2/18/25 AT 1:45 PM WITH DR. GARDUNO .        "

## 2025-02-18 ENCOUNTER — OFFICE VISIT (OUTPATIENT)
Dept: INTERNAL MEDICINE | Facility: CLINIC | Age: 85
End: 2025-02-18
Payer: MEDICARE

## 2025-02-18 VITALS
TEMPERATURE: 97.6 F | WEIGHT: 125 LBS | OXYGEN SATURATION: 97 % | HEIGHT: 62 IN | BODY MASS INDEX: 23 KG/M2 | SYSTOLIC BLOOD PRESSURE: 110 MMHG | DIASTOLIC BLOOD PRESSURE: 64 MMHG | HEART RATE: 65 BPM

## 2025-02-18 DIAGNOSIS — E03.9 HYPOTHYROIDISM, UNSPECIFIED TYPE: ICD-10-CM

## 2025-02-18 DIAGNOSIS — E11.9 TYPE 2 DIABETES MELLITUS WITHOUT COMPLICATION, WITHOUT LONG-TERM CURRENT USE OF INSULIN: ICD-10-CM

## 2025-02-18 DIAGNOSIS — J30.9 ALLERGIC RHINITIS, UNSPECIFIED SEASONALITY, UNSPECIFIED TRIGGER: ICD-10-CM

## 2025-02-18 DIAGNOSIS — D56.9 THALASSEMIA, UNSPECIFIED TYPE: Primary | ICD-10-CM

## 2025-02-18 LAB — HBA1C MFR BLD: 5.5 % (ref 4.5–5.7)

## 2025-02-18 PROCEDURE — 83036 HEMOGLOBIN GLYCOSYLATED A1C: CPT | Performed by: INTERNAL MEDICINE

## 2025-02-18 PROCEDURE — 99214 OFFICE O/P EST MOD 30 MIN: CPT | Performed by: INTERNAL MEDICINE

## 2025-02-18 PROCEDURE — 1126F AMNT PAIN NOTED NONE PRSNT: CPT | Performed by: INTERNAL MEDICINE

## 2025-02-18 PROCEDURE — 3044F HG A1C LEVEL LT 7.0%: CPT | Performed by: INTERNAL MEDICINE

## 2025-02-18 RX ORDER — AZELASTINE HYDROCHLORIDE 137 UG/1
2 SPRAY, METERED NASAL 2 TIMES DAILY
Qty: 30 ML | Refills: 5 | Status: SHIPPED | OUTPATIENT
Start: 2025-02-18

## 2025-02-19 NOTE — PROGRESS NOTES
"      Chief Complaint  Diabetes (A1c: 5.5), Hypothyroidism, pneumo vaccine, Hyperlipidemia, and discuss labs pt wants drawn (THE PATIENT STATES THAT SHE WOULD LIKE TO GET: BLOOD CANCER SCREENING, C-REACTIVE PROTEIN, HEMOGLOBIN.)    Subjective        Ricarda Hatch presents to [facility]    HPI    Patient here for the above problems.  See Assessment and Plan for further HPI components.      Review of Systems    Objective   Vital Signs:  /64 (BP Location: Left arm, Patient Position: Sitting, Cuff Size: Adult)   Pulse 65   Temp 97.6 °F (36.4 °C) (Temporal)   Ht 157.5 cm (62\")   Wt 56.7 kg (125 lb)   SpO2 97%   BMI 22.86 kg/m²   Estimated body mass index is 22.86 kg/m² as calculated from the following:    Height as of this encounter: 157.5 cm (62\").    Weight as of this encounter: 56.7 kg (125 lb).      Physical Exam  Vitals and nursing note reviewed.   Constitutional:       Appearance: She is not ill-appearing.   Eyes:      General: No scleral icterus.     Conjunctiva/sclera: Conjunctivae normal.   Pulmonary:      Effort: Pulmonary effort is normal. No respiratory distress.   Skin:     General: Skin is warm.      Coloration: Skin is not pale.   Neurological:      General: No focal deficit present.      Mental Status: She is alert and oriented to person, place, and time.   Psychiatric:         Mood and Affect: Mood normal.         Behavior: Behavior normal.                     Assessment and Plan   Diagnoses and all orders for this visit:    1. Thalassemia, unspecified type (Primary)  -     CBC w AUTO Differential  -     Ferritin  -     Iron Profile  -     C-reactive protein  -     Comprehensive metabolic panel    2. Hypothyroidism, unspecified type  -     TSH Rfx On Abnormal To Free T4    3. Allergic rhinitis, unspecified seasonality, unspecified trigger  -     Azelastine HCl 137 MCG/SPRAY solution; Administer 2 sprays into the nostril(s) as directed by provider 2 (Two) Times a " Day.  Dispense: 30 mL; Refill: 5    4. Type 2 diabetes mellitus without complication, without long-term current use of insulin  -     POC Glycated Hemoglobin, Total      Patient has a history of diabetes but it has been controlled for some time.  Patient has been on metformin.  Patient A1c controlled.      Patient has a history of hypothyroidism.  She is on liothyronine.  Continue, will check TSH today.    Patient has allergic rhinitis.  Patient on xyzal and azelastine.      Patient has been stable for some time.      Patient has had more fatigue recently and is worried about her anemia.  Will check labs as above.              Result Review :  The following data was reviewed by: Sergio Valentin MD on 02/18/2025:    Patient brought in some labs from [facility] these were collected on 1/28/2025.  She did not elaborate on what these were for.  She had a Chem-7 panel which showed sodium of 137, potassium 3.8, creatinine 0.85 and an elevated sugar 127.  Patient had a CBC that showed white count of 11.5 which is slightly high, hemoglobin of 12.7 with an MCV of 71.8.  She does have a history of thalassemia which will cause a low MCV.  Platelet count was normal at 323.  She had a lipase of 33.  Liver enzymes showed normal AST and ALT at 22 and 17 respectively.  These results will be scanned into the computer.         BMI is within normal parameters. No other follow-up for BMI required.      BMI is within normal parameters. No other follow-up for BMI required.            Follow Up   Return in about 6 months (around 8/18/2025), or if symptoms worsen or fail to improve, for follow up for above problems. Longitudinal care., Medicare Wellness - Labs prior to visit.  Patient was given instructions and counseling regarding her condition or for health maintenance advice. Please see specific information pulled into the AVS if appropriate.       OPAL Valentin MD, FACP, [facility]      Electronically signed by Sergio Yang  MD Homero, 02/19/25, 2:10 PM CST.

## 2025-03-10 LAB
ALBUMIN SERPL-MCNC: 4.2 G/DL (ref 3.5–5.2)
ALBUMIN/GLOB SERPL: 1.7 G/DL
ALP SERPL-CCNC: 68 U/L (ref 39–117)
ALT SERPL-CCNC: 9 U/L (ref 1–33)
AST SERPL-CCNC: 18 U/L (ref 1–32)
BASOPHILS # BLD MANUAL: 0.06 10*3/MM3 (ref 0–0.2)
BASOPHILS NFR BLD MANUAL: 1 % (ref 0–1.5)
BILIRUB SERPL-MCNC: 0.5 MG/DL (ref 0–1.2)
BUN SERPL-MCNC: 13 MG/DL (ref 8–23)
BUN/CREAT SERPL: 14.9 (ref 7–25)
CALCIUM SERPL-MCNC: 9.4 MG/DL (ref 8.6–10.5)
CHLORIDE SERPL-SCNC: 99 MMOL/L (ref 98–107)
CO2 SERPL-SCNC: 27.5 MMOL/L (ref 22–29)
CREAT SERPL-MCNC: 0.87 MG/DL (ref 0.57–1)
CRP SERPL-MCNC: <0.3 MG/DL (ref 0–0.5)
DIFFERENTIAL COMMENT: NORMAL
EGFRCR SERPLBLD CKD-EPI 2021: 66.6 ML/MIN/1.73
EOSINOPHIL # BLD MANUAL: 0.06 10*3/MM3 (ref 0–0.4)
EOSINOPHIL NFR BLD MANUAL: 1 % (ref 0.3–6.2)
ERYTHROCYTE [DISTWIDTH] IN BLOOD BY AUTOMATED COUNT: 15 % (ref 12.3–15.4)
FERRITIN SERPL-MCNC: 37.5 NG/ML (ref 13–150)
GLOBULIN SER CALC-MCNC: 2.5 GM/DL
GLUCOSE SERPL-MCNC: 112 MG/DL (ref 65–99)
HCT VFR BLD AUTO: 35.8 % (ref 34–46.6)
HGB BLD-MCNC: 11.4 G/DL (ref 12–15.9)
IRON SATN MFR SERPL: 30 % (ref 20–50)
IRON SERPL-MCNC: 100 MCG/DL (ref 37–145)
LYMPHOCYTES # BLD MANUAL: 1.62 10*3/MM3 (ref 0.7–3.1)
LYMPHOCYTES NFR BLD MANUAL: 28.3 % (ref 19.6–45.3)
MCH RBC QN AUTO: 23.6 PG (ref 26.6–33)
MCHC RBC AUTO-ENTMCNC: 31.8 G/DL (ref 31.5–35.7)
MCV RBC AUTO: 74 FL (ref 79–97)
MONOCYTES # BLD MANUAL: 0.58 10*3/MM3 (ref 0.1–0.9)
MONOCYTES NFR BLD MANUAL: 10.1 % (ref 5–12)
NEUTROPHILS # BLD MANUAL: 3.42 10*3/MM3 (ref 1.7–7)
NEUTROPHILS NFR BLD MANUAL: 59.6 % (ref 42.7–76)
NRBC BLD AUTO-RTO: 0 /100 WBC (ref 0–0.2)
PLATELET # BLD AUTO: 325 10*3/MM3 (ref 140–450)
PLATELET BLD QL SMEAR: NORMAL
POTASSIUM SERPL-SCNC: 4.4 MMOL/L (ref 3.5–5.2)
PROT SERPL-MCNC: 6.7 G/DL (ref 6–8.5)
RBC # BLD AUTO: 4.84 10*6/MM3 (ref 3.77–5.28)
RBC MORPH BLD: NORMAL
SODIUM SERPL-SCNC: 137 MMOL/L (ref 136–145)
TIBC SERPL-MCNC: 337 MCG/DL
TSH SERPL DL<=0.005 MIU/L-ACNC: 2.89 UIU/ML (ref 0.27–4.2)
UIBC SERPL-MCNC: 237 MCG/DL (ref 112–346)
WBC # BLD AUTO: 5.74 10*3/MM3 (ref 3.4–10.8)

## 2025-03-21 ENCOUNTER — HOSPITAL ENCOUNTER (OUTPATIENT)
Dept: BONE DENSITY | Facility: HOSPITAL | Age: 85
Discharge: HOME | End: 2025-03-21
Payer: MEDICARE

## 2025-03-21 DIAGNOSIS — Z78.0 POST-MENOPAUSAL: ICD-10-CM

## 2025-03-21 DIAGNOSIS — Z13.820 OSTEOPOROSIS SCREENING: ICD-10-CM

## 2025-03-21 PROCEDURE — 77080 DXA BONE DENSITY AXIAL: CPT

## 2025-03-27 DIAGNOSIS — G25.0 ESSENTIAL TREMOR: ICD-10-CM

## 2025-03-27 RX ORDER — PROPRANOLOL HCL 20 MG
20 TABLET ORAL 2 TIMES DAILY
Qty: 180 TABLET | Refills: 0 | Status: SHIPPED | OUTPATIENT
Start: 2025-03-27

## 2025-03-27 NOTE — TELEPHONE ENCOUNTER
Paulette Sutton called to request a refill on her medication.      Last office visit : 7/22/2024   Next office visit : 6/17/2025     Requested Prescriptions     Signed Prescriptions Disp Refills    propranolol (INDERAL) 20 MG tablet 180 tablet 0     Sig: TAKE 1 TABLET BY MOUTH TWICE A DAY     Authorizing Provider: PEYTON SALVADOR     Ordering User: DARRELL NUGENT LPN   [1 or 2 (0 pts)] : 1 or 2 (0 points) [Never (0 pts)] : Never (0 points) [No] : In the past 12 months have you used drugs other than those required for medical reasons? No [No falls in past year] : Patient reported no falls in the past year [0] : 1) Little interest or pleasure doing things: Not at all (0) [] : No [Audit-CScore] : 0 [de-identified] : Average [de-identified] : Average [UPF1Oglyw] : 0 [YTI2Vagwb] : 0

## 2025-04-14 ENCOUNTER — OFFICE VISIT (OUTPATIENT)
Dept: NEUROLOGY | Age: 85
End: 2025-04-14
Payer: MEDICARE

## 2025-04-14 VITALS
WEIGHT: 125 LBS | OXYGEN SATURATION: 98 % | HEIGHT: 62 IN | DIASTOLIC BLOOD PRESSURE: 77 MMHG | HEART RATE: 71 BPM | BODY MASS INDEX: 23 KG/M2 | SYSTOLIC BLOOD PRESSURE: 158 MMHG

## 2025-04-14 DIAGNOSIS — G31.84 MILD COGNITIVE IMPAIRMENT: Primary | ICD-10-CM

## 2025-04-14 PROCEDURE — 1123F ACP DISCUSS/DSCN MKR DOCD: CPT | Performed by: NURSE PRACTITIONER

## 2025-04-14 PROCEDURE — 1159F MED LIST DOCD IN RCRD: CPT | Performed by: NURSE PRACTITIONER

## 2025-04-14 PROCEDURE — 99213 OFFICE O/P EST LOW 20 MIN: CPT | Performed by: NURSE PRACTITIONER

## 2025-04-14 PROCEDURE — 1090F PRES/ABSN URINE INCON ASSESS: CPT | Performed by: NURSE PRACTITIONER

## 2025-04-14 PROCEDURE — G8420 CALC BMI NORM PARAMETERS: HCPCS | Performed by: NURSE PRACTITIONER

## 2025-04-14 PROCEDURE — 1160F RVW MEDS BY RX/DR IN RCRD: CPT | Performed by: NURSE PRACTITIONER

## 2025-04-14 PROCEDURE — 1036F TOBACCO NON-USER: CPT | Performed by: NURSE PRACTITIONER

## 2025-04-14 PROCEDURE — G8399 PT W/DXA RESULTS DOCUMENT: HCPCS | Performed by: NURSE PRACTITIONER

## 2025-04-14 PROCEDURE — G8427 DOCREV CUR MEDS BY ELIG CLIN: HCPCS | Performed by: NURSE PRACTITIONER

## 2025-04-14 RX ORDER — DONEPEZIL HYDROCHLORIDE 5 MG/1
5 TABLET, FILM COATED ORAL NIGHTLY
Qty: 30 TABLET | Refills: 3 | Status: SHIPPED | OUTPATIENT
Start: 2025-04-14

## 2025-04-14 NOTE — PROGRESS NOTES
[facility] Office Note      Patient:   Paulette Sutton  MR#:    [id number]  Account Number:                         YOB: 1940  Date of Evaluation:  4/14/2025  Time of Note:                          3:12 PM  Primary/Referring Physician:  Jolene Rodrigues MD   Consulting Physician:  Ara Min DNP, APRN    FOLLOW UP    Chief Complaint   Patient presents with    Follow-up    Memory Loss     Pt states things are about the same     HISTORY OF PRESENT ILLNESS    Paulette Sutton is a 84 y.o. year old female here for follow up of memory loss. She is alone at today's appointment but able to provide a good history. She feels that there has been some progression from prior visit, states her daughter feels this too. She has a long history of memory changes, beginning in the 60s. Has been progressive since that time. She reports living on her own, family lives nearby. Primarily short term memory loss. Some name/word recall difficulty. Some repetition of stories and questions. Memory loss is not interfering with ADLs. She has some difficulty with remembering to take her medications, does better with routine. She is using a pill organizer. She is driving, no tickets, accidents. Reports that she does get turned around when driving in unfamiliar places. She is handling her own finances. She is cooking, able to follow a recipe. Her daughter had noted that she has missed a few doctor's appointments. She has also noted some confusion in stories that she tells. She has 2 rental properties and able to handle this. Denies personality changes. Denies depression/anxiety. She has an underlying essential tremor, on Propranolol and fairly well controlled. Vocal tremor noted as well. Some gait changes noted, feels imbalanced. Denies falls. Notes intermittent urinary incontinence, describes it more as stress/urge incontinence. On Namenda but states she takes it for migraines, was prescribed this years ago by neurology

## 2025-04-14 NOTE — PROGRESS NOTES

## 2025-05-12 ENCOUNTER — OFFICE VISIT (OUTPATIENT)
Age: 85
End: 2025-05-12
Payer: MEDICARE

## 2025-05-12 VITALS
WEIGHT: 123 LBS | BODY MASS INDEX: 22.63 KG/M2 | DIASTOLIC BLOOD PRESSURE: 64 MMHG | HEIGHT: 62 IN | SYSTOLIC BLOOD PRESSURE: 120 MMHG

## 2025-05-12 DIAGNOSIS — Z79.890 POSTMENOPAUSAL HRT (HORMONE REPLACEMENT THERAPY): ICD-10-CM

## 2025-05-12 DIAGNOSIS — N95.0 PMB (POSTMENOPAUSAL BLEEDING): Primary | ICD-10-CM

## 2025-05-12 DIAGNOSIS — Z87.891 FORMER SMOKER: ICD-10-CM

## 2025-05-12 RX ORDER — PERFLUOROHEXYLOCTANE 1 MG/MG
SOLUTION OPHTHALMIC
COMMUNITY
Start: 2025-05-07

## 2025-05-12 RX ORDER — CHLORHEXIDINE GLUCONATE ORAL RINSE 1.2 MG/ML
SOLUTION DENTAL
COMMUNITY
Start: 2025-03-31

## 2025-05-12 RX ORDER — TIMOLOL MALEATE 5 MG/ML
SOLUTION/ DROPS OPHTHALMIC
COMMUNITY
Start: 2025-04-14

## 2025-05-12 RX ORDER — DONEPEZIL HYDROCHLORIDE 5 MG/1
5 TABLET, FILM COATED ORAL NIGHTLY
COMMUNITY
Start: 2025-04-14

## 2025-05-12 RX ORDER — PREDNISOLONE ACETATE 10 MG/ML
SUSPENSION/ DROPS OPHTHALMIC
COMMUNITY
Start: 2025-05-03

## 2025-05-12 NOTE — PROGRESS NOTES
"Chief Complaint  post-menopausal bleeding (Pt is here with c/o PMB.  Started about a month ago, states that she forgot her progesterone pills so she was unable to take but she did bring estrogen pills so the bleeding did stop. )  History of Present Illness  The patient presents for evaluation of vaginal bleeding.    She experienced vaginal bleeding accompanied by mild cramping, which ceased a few days after discontinuing her progesterone medication. The bleeding, described as brown and ky, had been ongoing for 3 weeks prior to her trip to [place] on Wednesday. The volume of bleeding varied daily, with some days being more or less than others. She resumed her progesterone regimen yesterday. She also reports a history of fibroids and is uncertain if her estrogen dosage was increased or her progesterone dosage was decreased. She recalls missing two doses of estrogen approximately a month ago, which she believes may have triggered the bleeding. She is currently using topical estrogen and is curious about its benefits. She is a  and does not engage in sexual intercourse but notes that her vaginal tissue was thin and prone to bleeding prior to starting estrogen therapy. She had inadvertently left her progesterone capsules behind during a trip to [place] but continued her estrogen therapy.        Subjective          Ricarda Hatch presents to [facility]  History of Present Illness    Review of Systems   Genitourinary:         Bleeding for about 3 wks         Objective   Vital Signs:   /64   Ht 157.5 cm (62\")   Wt 55.8 kg (123 lb)   BMI 22.50 kg/m²     Physical Exam  Vitals reviewed.   Constitutional:       Appearance: She is well-developed.   Eyes:      General:         Right eye: No discharge.         Left eye: No discharge.   Cardiovascular:      Rate and Rhythm: Normal rate and regular rhythm.   Pulmonary:      Effort: Pulmonary effort is normal.      Breath sounds: Normal " breath sounds.   Skin:     General: Skin is warm.   Neurological:      Mental Status: She is alert and oriented to person, place, and time.   Psychiatric:         Behavior: Behavior normal.         Thought Content: Thought content normal.         Judgment: Judgment normal.         Result Review :   The following data was reviewed by: GALINDO Martinez on 05/12/2025:    Data reviewed : Radiologic studies transvaginal US    Impression:     1.  Uterus: Normal size and Anteverted     2.  Endometrium:  2 mm     3.  Myometrium: Single fibroid 1 cm     4.  Ovaries  Left:    Not visualized and Surgically absent  Right:  Not visualized and Surgically absent     Relevant comparison data: No relevant comparison data  Shayan Rowe MD  5/12/2025 16:09 CDT        Current Outpatient Medications on File Prior to Visit   Medication Sig    Alpha-Lipoic Acid 100 MG tablet Take 200 mg by mouth.    Azelastine HCl 137 MCG/SPRAY solution Administer 2 sprays into the nostril(s) as directed by provider 2 (Two) Times a Day.    calcium citrate (CALCITRATE) 950 (200 Ca) MG tablet Take 2 tablets by mouth.    chlorhexidine (PERIDEX) 0.12 % solution     coenzyme Q10 100 MG capsule Take 1 capsule by mouth Daily.    donepezil (ARICEPT) 5 MG tablet Take 1 tablet by mouth Every Night.    esomeprazole (NexIUM) 20 MG packet Take 20 mg by mouth.    estradiol (CLIMARA) 0.025 MG/24HR patch PLACE 1 PATCH ONTO THE SKIN EVERY 7 DAYS.    estradiol (ESTRACE) 0.1 MG/GM vaginal cream PLACE 1 GRAM VAGINALLY TWICE A WEEK    fluticasone (FLONASE) 50 MCG/ACT nasal spray 1 spray.    levocetirizine (XYZAL) 5 MG tablet Take 1 tablet by mouth.    liothyronine (CYTOMEL) 5 MCG tablet TAKE 1 TABLET EVERY DAY BY ORAL ROUTE BEFORE MEAL(S).    loperamide (IMODIUM A-D) 2 MG tablet Take 0.5 tablets by mouth.    Lysine 500 MG capsule Take  by mouth Daily.    memantine (NAMENDA) 10 MG tablet     metFORMIN (GLUCOPHAGE) 500 MG tablet Take 1 tablet by mouth Daily With  Breakfast.    Miebo 1.338 GM/ML solution     NON FORMULARY Testosterone cream    NON FORMULARY Testosterone compound    olopatadine (PATADAY) 0.2 % solution ophthalmic solution INSTILL 1 DROP INTO BOTH EYES EVERY DAY    PEPPERMINT OIL PO Take 1 capsule by mouth 2 (Two) Times a Day.    prednisoLONE acetate (PRED FORTE) 1 % ophthalmic suspension     PREGNENOLONE MICRONIZED PO Take  by mouth.    Progesterone (PROMETRIUM) 100 MG capsule TAKE 1 CAPSULE BY MOUTH EVERY DAY AT BEDTIME FOR 30 DAYS    propranolol (INDERAL) 20 MG tablet Take 1 tablet by mouth Daily.    sodium chloride 1 g tablet TAKE 1 TABLET BY MOUTH EVERY DAY    timolol (TIMOPTIC) 0.5 % ophthalmic solution INSTILL 1 DROP INTO BOTH EYES EVERY MORNING    traMADol (ULTRAM) 50 MG tablet Take 1 tablet by mouth Every 6 (Six) Hours As Needed for Moderate Pain.    valACYclovir (Valtrex) 1000 MG tablet Take 1 tablet by mouth Daily.     No current facility-administered medications on file prior to visit.          Assessment & Plan  1. Vaginal bleeding.  The vaginal bleeding is likely a response to hormonal fluctuations. The endometrial lining is sufficiently thin, negating the need for additional testing at this juncture.   - Advised stopping bother progesterone and estrogen. Pt opts to continue progesterone while either reducing or discontinuing estrogen and will discuss with prescribing provider.  - Schedule intermittent visits for imaging to ensure stability or if new bleeding.        Diagnoses and all orders for this visit:    1. PMB (postmenopausal bleeding) (Primary)    2. Former smoker    3. Postmenopausal HRT (hormone replacement therapy)          BMI is within normal parameters. No other follow-up for BMI required.       Follow Up   Return if symptoms worsen or fail to improve, for Annual physical.    Patient was given instructions and counseling regarding her condition or for health maintenance advice. Please see specific information pulled into the AVS if  appropriate.       Patient or patient representative verbalized consent for the use of Ambient Listening during the visit with  GALINDO Martinez for chart documentation. 5/18/2025  21:31 CDT

## 2025-05-19 NOTE — PATIENT INSTRUCTIONS

## 2025-06-02 DIAGNOSIS — R73.03 PREDIABETES: ICD-10-CM

## 2025-06-02 NOTE — TELEPHONE ENCOUNTER
Paulette Sutton called to request a refill on her medication.      Last office visit : 7/22/2024   Next office visit : 6/17/2025     Requested Prescriptions     Signed Prescriptions Disp Refills    metFORMIN (GLUCOPHAGE) 500 MG tablet 90 tablet 1     Sig: TAKE 1 TABLET BY MOUTH EVERY DAY WITH BREAKFAST     Authorizing Provider: PEYTON SALVADOR     Ordering User: DARRELL NUGENT LPN

## 2025-06-03 ENCOUNTER — TELEPHONE (OUTPATIENT)
Dept: INTERNAL MEDICINE | Facility: CLINIC | Age: 85
End: 2025-06-03
Payer: MEDICARE

## 2025-06-03 DIAGNOSIS — F09 COGNITIVE DISORDER: ICD-10-CM

## 2025-06-03 RX ORDER — MEMANTINE HYDROCHLORIDE 10 MG/1
10 TABLET ORAL 2 TIMES DAILY
Qty: 180 TABLET | Refills: 3 | Status: SHIPPED | OUTPATIENT
Start: 2025-06-03

## 2025-06-03 RX ORDER — MEMANTINE HYDROCHLORIDE 10 MG/1
10 TABLET ORAL 2 TIMES DAILY
Qty: 180 TABLET | Refills: 0 | Status: SHIPPED | OUTPATIENT
Start: 2025-06-03

## 2025-06-03 NOTE — TELEPHONE ENCOUNTER
"    Caller: HoldenRodolfoRicarda \"AGUEDA\"    Relationship: Self    Best call back number: [phone]     Requested Prescriptions:   Requested Prescriptions     Pending Prescriptions Disp Refills    memantine (NAMENDA) 10 MG tablet          Pharmacy where request should be sent: [id number] - [facility] [place] - [address] - [phone] [facility] [phone] FX     Last office visit with prescribing clinician: 2/18/2025   Last telemedicine visit with prescribing clinician: Visit date not found   Next office visit with prescribing clinician: 9/24/2025     Additional details provided by patient: CALL PATIENT IF AN APPT IS NEEDED. IT'S FOR MIGRAINES     Does the patient have less than a 3 day supply:  [x] Yes  [] No    Would you like a call back once the refill request has been completed: [] Yes [x] No    If the office needs to give you a call back, can they leave a voicemail: [] Yes [x] No    Jaime Iniguez III, Luann Rep   06/03/25 08:42 CDT           "

## 2025-06-16 ENCOUNTER — HOSPITAL ENCOUNTER (EMERGENCY)
Facility: HOSPITAL | Age: 85
Discharge: HOME | End: 2025-06-16
Admitting: FAMILY MEDICINE
Payer: MEDICARE

## 2025-06-16 ENCOUNTER — OFFICE VISIT (OUTPATIENT)
Dept: INTERNAL MEDICINE | Facility: CLINIC | Age: 85
End: 2025-06-16
Payer: MEDICARE

## 2025-06-16 ENCOUNTER — APPOINTMENT (OUTPATIENT)
Dept: CT IMAGING | Facility: HOSPITAL | Age: 85
End: 2025-06-16
Payer: MEDICARE

## 2025-06-16 VITALS
RESPIRATION RATE: 15 BRPM | WEIGHT: 134.4 LBS | HEART RATE: 71 BPM | OXYGEN SATURATION: 92 % | HEIGHT: 62 IN | BODY MASS INDEX: 24.73 KG/M2 | SYSTOLIC BLOOD PRESSURE: 123 MMHG | DIASTOLIC BLOOD PRESSURE: 59 MMHG | TEMPERATURE: 98.1 F

## 2025-06-16 VITALS
BODY MASS INDEX: 22.26 KG/M2 | HEART RATE: 71 BPM | HEIGHT: 62 IN | TEMPERATURE: 98.6 F | RESPIRATION RATE: 18 BRPM | SYSTOLIC BLOOD PRESSURE: 118 MMHG | OXYGEN SATURATION: 94 % | DIASTOLIC BLOOD PRESSURE: 72 MMHG | WEIGHT: 121 LBS

## 2025-06-16 DIAGNOSIS — R25.9 INVOLUNTARY MOVEMENTS: ICD-10-CM

## 2025-06-16 DIAGNOSIS — E86.0 DEHYDRATION: Primary | ICD-10-CM

## 2025-06-16 DIAGNOSIS — T88.7XXA SIDE EFFECT OF MEDICATION: Primary | ICD-10-CM

## 2025-06-16 DIAGNOSIS — R53.83 OTHER FATIGUE: ICD-10-CM

## 2025-06-16 DIAGNOSIS — E55.9 VITAMIN D DEFICIENCY: ICD-10-CM

## 2025-06-16 LAB
ALBUMIN SERPL-MCNC: 4 G/DL (ref 3.5–5.2)
ALBUMIN/GLOB SERPL: 1.5 G/DL
ALP SERPL-CCNC: 66 U/L (ref 39–117)
ALT SERPL W P-5'-P-CCNC: 8 U/L (ref 1–33)
AMPHET+METHAMPHET UR QL: NEGATIVE
AMPHETAMINES UR QL: NEGATIVE
ANION GAP SERPL CALCULATED.3IONS-SCNC: 12 MMOL/L (ref 5–15)
ARTERIAL PATENCY WRIST A: POSITIVE
AST SERPL-CCNC: 22 U/L (ref 1–32)
ATMOSPHERIC PRESS: 750 MMHG
BARBITURATES UR QL SCN: NEGATIVE
BASE EXCESS BLDA CALC-SCNC: 2 MMOL/L (ref 0–2)
BDY SITE: ABNORMAL
BENZODIAZ UR QL SCN: NEGATIVE
BILIRUB SERPL-MCNC: 0.5 MG/DL (ref 0–1.2)
BILIRUB UR QL STRIP: NEGATIVE
BODY TEMPERATURE: 37
BUN SERPL-MCNC: 15.1 MG/DL (ref 8–23)
BUN/CREAT SERPL: 21.3 (ref 7–25)
BUPRENORPHINE SERPL-MCNC: NEGATIVE NG/ML
CALCIUM SPEC-SCNC: 9.1 MG/DL (ref 8.6–10.5)
CANNABINOIDS SERPL QL: NEGATIVE
CHLORIDE SERPL-SCNC: 97 MMOL/L (ref 98–107)
CK SERPL-CCNC: 150 U/L (ref 20–180)
CLARITY UR: CLEAR
CO2 SERPL-SCNC: 25 MMOL/L (ref 22–29)
COCAINE UR QL: NEGATIVE
COHGB MFR BLD: 1 % (ref 0–5)
COLOR UR: YELLOW
CREAT SERPL-MCNC: 0.71 MG/DL (ref 0.57–1)
D-LACTATE SERPL-SCNC: 0.8 MMOL/L (ref 0.5–2)
DEPRECATED RDW RBC AUTO: 38 FL (ref 37–54)
EGFRCR SERPLBLD CKD-EPI 2021: 85 ML/MIN/1.73
EOSINOPHIL # BLD MANUAL: 0.12 10*3/MM3 (ref 0–0.4)
EOSINOPHIL NFR BLD MANUAL: 2 % (ref 0.3–6.2)
ERYTHROCYTE [DISTWIDTH] IN BLOOD BY AUTOMATED COUNT: 15.2 % (ref 12.3–15.4)
ETHANOL UR QL: <0.01 %
FENTANYL UR-MCNC: NEGATIVE NG/ML
GLOBULIN UR ELPH-MCNC: 2.7 GM/DL
GLUCOSE SERPL-MCNC: 80 MG/DL (ref 65–99)
GLUCOSE UR STRIP-MCNC: NEGATIVE MG/DL
HCO3 BLDA-SCNC: 25.7 MMOL/L (ref 20–26)
HCT VFR BLD AUTO: 34.8 % (ref 34–46.6)
HCT VFR BLD CALC: 34.5 % (ref 38–51)
HGB BLD-MCNC: 11.5 G/DL (ref 12–15.9)
HGB BLDA-MCNC: 11.3 G/DL (ref 12–16)
HGB UR QL STRIP.AUTO: NEGATIVE
HOLD SPECIMEN: NORMAL
HOLD SPECIMEN: NORMAL
KETONES UR QL STRIP: ABNORMAL
LEUKOCYTE ESTERASE UR QL STRIP.AUTO: NEGATIVE
LYMPHOCYTES # BLD MANUAL: 2.05 10*3/MM3 (ref 0.7–3.1)
LYMPHOCYTES NFR BLD MANUAL: 7 % (ref 5–12)
Lab: ABNORMAL
MAGNESIUM SERPL-MCNC: 1.9 MG/DL (ref 1.6–2.4)
MCH RBC QN AUTO: 23.5 PG (ref 26.6–33)
MCHC RBC AUTO-ENTMCNC: 33 G/DL (ref 31.5–35.7)
MCV RBC AUTO: 71.2 FL (ref 79–97)
METHADONE UR QL SCN: NEGATIVE
METHGB BLD QL: 0.5 % (ref 0–3)
MODALITY: ABNORMAL
MONOCYTES # BLD: 0.41 10*3/MM3 (ref 0.1–0.9)
NEUTROPHILS # BLD AUTO: 3.28 10*3/MM3 (ref 1.7–7)
NEUTROPHILS NFR BLD MANUAL: 56 % (ref 42.7–76)
NITRITE UR QL STRIP: NEGATIVE
OPIATES UR QL: NEGATIVE
OXYCODONE UR QL SCN: NEGATIVE
OXYHGB MFR BLDV: 96.5 % (ref 94–99)
PCO2 BLDA: 35.8 MM HG (ref 35–45)
PCO2 TEMP ADJ BLD: 35.8 MM HG (ref 35–45)
PCP UR QL SCN: NEGATIVE
PH BLDA: 7.46 PH UNITS (ref 7.35–7.45)
PH UR STRIP.AUTO: 5.5 [PH] (ref 5–8)
PH, TEMP CORRECTED: 7.46 PH UNITS (ref 7.35–7.45)
PLAT MORPH BLD: NORMAL
PLATELET # BLD AUTO: 284 10*3/MM3 (ref 140–450)
PMV BLD AUTO: 9.8 FL (ref 6–12)
PO2 BLDA: 88.6 MM HG (ref 83–108)
PO2 TEMP ADJ BLD: 88.6 MM HG (ref 83–108)
POTASSIUM BLDA-SCNC: 3.9 MMOL/L (ref 3.5–5.2)
POTASSIUM SERPL-SCNC: 4.3 MMOL/L (ref 3.5–5.2)
PROT SERPL-MCNC: 6.7 G/DL (ref 6–8.5)
PROT UR QL STRIP: NEGATIVE
RBC # BLD AUTO: 4.89 10*6/MM3 (ref 3.77–5.28)
RBC MORPH BLD: NORMAL
SAO2 % BLDCOA: 98 % (ref 94–99)
SODIUM BLDA-SCNC: 131 MMOL/L (ref 136–145)
SODIUM SERPL-SCNC: 134 MMOL/L (ref 136–145)
SP GR UR STRIP: 1.01 (ref 1–1.03)
TRICYCLICS UR QL SCN: NEGATIVE
UROBILINOGEN UR QL STRIP: ABNORMAL
VARIANT LYMPHS NFR BLD MANUAL: 35 % (ref 19.6–45.3)
VENTILATOR MODE: ABNORMAL
WBC MORPH BLD: NORMAL
WBC NRBC COR # BLD AUTO: 5.85 10*3/MM3 (ref 3.4–10.8)
WHOLE BLOOD HOLD COAG: NORMAL
WHOLE BLOOD HOLD SPECIMEN: NORMAL

## 2025-06-16 PROCEDURE — 81003 URINALYSIS AUTO W/O SCOPE: CPT | Performed by: PHYSICIAN ASSISTANT

## 2025-06-16 PROCEDURE — 63710000001 DIPHENHYDRAMINE PER 50 MG: Performed by: PHYSICIAN ASSISTANT

## 2025-06-16 PROCEDURE — 70450 CT HEAD/BRAIN W/O DYE: CPT

## 2025-06-16 PROCEDURE — 99214 OFFICE O/P EST MOD 30 MIN: CPT

## 2025-06-16 PROCEDURE — 83050 HGB METHEMOGLOBIN QUAN: CPT

## 2025-06-16 PROCEDURE — 82077 ASSAY SPEC XCP UR&BREATH IA: CPT | Performed by: PHYSICIAN ASSISTANT

## 2025-06-16 PROCEDURE — 83735 ASSAY OF MAGNESIUM: CPT | Performed by: PHYSICIAN ASSISTANT

## 2025-06-16 PROCEDURE — 82550 ASSAY OF CK (CPK): CPT | Performed by: PHYSICIAN ASSISTANT

## 2025-06-16 PROCEDURE — 85007 BL SMEAR W/DIFF WBC COUNT: CPT | Performed by: PHYSICIAN ASSISTANT

## 2025-06-16 PROCEDURE — 36415 COLL VENOUS BLD VENIPUNCTURE: CPT

## 2025-06-16 PROCEDURE — 85025 COMPLETE CBC W/AUTO DIFF WBC: CPT | Performed by: PHYSICIAN ASSISTANT

## 2025-06-16 PROCEDURE — 80307 DRUG TEST PRSMV CHEM ANLYZR: CPT | Performed by: PHYSICIAN ASSISTANT

## 2025-06-16 PROCEDURE — 99284 EMERGENCY DEPT VISIT MOD MDM: CPT

## 2025-06-16 PROCEDURE — G2211 COMPLEX E/M VISIT ADD ON: HCPCS

## 2025-06-16 PROCEDURE — 1125F AMNT PAIN NOTED PAIN PRSNT: CPT

## 2025-06-16 PROCEDURE — 83605 ASSAY OF LACTIC ACID: CPT | Performed by: PHYSICIAN ASSISTANT

## 2025-06-16 PROCEDURE — 82375 ASSAY CARBOXYHB QUANT: CPT

## 2025-06-16 PROCEDURE — 36600 WITHDRAWAL OF ARTERIAL BLOOD: CPT

## 2025-06-16 PROCEDURE — 80053 COMPREHEN METABOLIC PANEL: CPT | Performed by: PHYSICIAN ASSISTANT

## 2025-06-16 PROCEDURE — 82805 BLOOD GASES W/O2 SATURATION: CPT

## 2025-06-16 RX ORDER — SODIUM CHLORIDE 0.9 % (FLUSH) 0.9 %
10 SYRINGE (ML) INJECTION AS NEEDED
Status: DISCONTINUED | OUTPATIENT
Start: 2025-06-16 | End: 2025-06-16 | Stop reason: HOSPADM

## 2025-06-16 RX ORDER — DIPHENHYDRAMINE HYDROCHLORIDE 50 MG/ML
12.5 INJECTION, SOLUTION INTRAMUSCULAR; INTRAVENOUS ONCE
Status: DISCONTINUED | OUTPATIENT
Start: 2025-06-16 | End: 2025-06-16

## 2025-06-16 RX ORDER — DIPHENHYDRAMINE HCL 25 MG
25 CAPSULE ORAL ONCE
Status: COMPLETED | OUTPATIENT
Start: 2025-06-16 | End: 2025-06-16

## 2025-06-16 RX ADMIN — DIPHENHYDRAMINE HCL 25 MG: 25 CAPSULE ORAL at 20:15

## 2025-06-16 NOTE — ED PROVIDER NOTES
Subjective   History of Present Illness 84-year-old female presents to the ER stating that she thinks she has had some seizure-like behavior and it may be due to low sodium.  She was hospitalized for this recently.  She told me that she was taking methylene blue over the weekend and she titrated up to where she drank it and smoothing and then felt weird from 11 AM till the next morning.  She started having word sounds, from her voice and stated that she was having spasms all over.  She read here by EMS and her doctors and route.  She is actually seen by GALINDO Pastor in the office this morning she is subjected low sodium.      Review of Systems no fever chills cough congestion chest pain shortness of breath.  No nausea vomit diarrhea abdominal pain.    History reviewed. No pertinent past medical history.    Allergies   Allergen Reactions    Epinephrine Anxiety, Other (See Comments) and Shortness Of Breath     Body spasms.    Fluconazole Nausea And Vomiting    Sulfa Antibiotics Hives, Nausea And Vomiting and Rash    Alcohol Other (See Comments)     GRAIN and Fruit based NOT topical, causes GI upset     GRAIN and Fruit based NOT topical, causes GI upset    Codeine Nausea And Vomiting    Magnesium Diarrhea     Tablets cause GI problems so she uses cream or oil       Past Surgical History:   Procedure Laterality Date    BILATERAL BREAST REDUCTION      LIVER BIOPSY      OOPHORECTOMY      REDUCTION MAMMAPLASTY         Family History   Problem Relation Age of Onset    Thalassemia Father     Osteoporosis Father        Social History     Socioeconomic History    Marital status:    Tobacco Use    Smoking status: Former     Types: Cigarettes     Start date:      Quit date:      Years since quittin.5    Smokeless tobacco: Never   Vaping Use    Vaping status: Never Used   Substance and Sexual Activity    Alcohol use: Never    Drug use: Never    Sexual activity: Not Currently     Partners: Male            Objective   Physical Exam  HEENT: EOMI PERRL, mucous membranes dry, nares patent  Neck: No lymphadenopathy  Chest: Clear to auscultation bilaterally without wheezing rhonchi or rales  Cardiovascular: Regular rate rhythm without murmur, no extremity edema  Abdomen: Soft nontender good bowel sounds no hernia.  No CVA tenderness.  Musculoskeletal exam: Full active range of motion with good distal pulses.  Skin: No rash  Neuro: Not postictal, no seizure activity      Procedures           ED Course      Ordered CBC CMP CK magnesium initially.  Then added CK, magnesium, alcohol, urine drug screen.                                     CBC was normal with white count of 5.85, hemoglobin 11.5, hematocrit 34.8, platelets 284.  Chemistry panel is normal.  Calcium is 9.1.  CK1 50, mag 1.9, negative alcohol, negative urine drug screen, urinalysis negative.    Spoke with poison control about the methylene blue and the symptoms patient was having and they wanted us to get a meth hemoglobin level.  That returned normal at 0.5.  After while patient had mentioned that she had used Benadryl in the past and it seemed to help.  Her daughter also confirmed this.  So gave her a dose of Benadryl 25 mg and about 1 hour later, evaluated patient and she has no problems with abnormal movements and she is otherwise very calm and ready to go home.  At this time briefed Dr. Meier on patient's improvements and will discharge her to home.  Her daughters go to make sure she gets rid of the methylene blue.              Medical Decision Making  Problems Addressed:  Side effect of medication: complicated acute illness or injury    Amount and/or Complexity of Data Reviewed  Labs: ordered.  Radiology: ordered.    Risk  Prescription drug management.    Patient presented to the ER with some side effects possibly from methylene blue but was not sure.  She was using it for her Alzheimer's to decrease the memory loss that she has been having.  She  was prescribed Namenda but she has not been taking that.  She has had none of the medications that are SSRIs, SNRI, tricyclic antidepressants that usually will cause serotonin syndrome.  I even had our pharmacist in-house review her meds and she said she is not on anything that would cause the symptoms today.  Looking at allergy symptoms from methylene blue patient did not have any of those either.  She did not have a cough no problems swallowing she was not dizzy she did not have a fast heart rate she did not have a rash she was not itchy was not having any trouble breathing.  So she was given some Benadryl here about 25 mg p.o. and 1 hour later she has feeling much better no abnormal movements and ready to be discharged.  So we will discharge home, they will get rid of the methylene blue, she will follow-up with her primary care provider for follow-on treatment.  Final diagnoses:   Side effect of medication       ED Disposition  ED Disposition       ED Disposition   Discharge    Condition   Stable    Comment   --               Sergio Valentin MD  [address] [place] [address]  [phone]      Call to schedule follow-up appointment         Medication List        Changed      sodium chloride 1 g tablet  TAKE 1 TABLET BY MOUTH EVERY DAY  What changed: additional instructions                 Josse Torres PA-C  06/18/25 1351       Josse Torres PA-C  06/18/25 1842

## 2025-06-16 NOTE — PROGRESS NOTES
"Chief Complaint  Eye Problem (Patient states her eyelids are stinging and 'mattering\" X7 weeks ago.) and Dehydration (Patient states she is dehydrated all over.  Patient states she drinks about 36 oz of water daily.)    Subjective    History of Present Illness      Patient presents to [facility] for      History of Present Illness  The patient is an 84-year-old female who presents for evaluation of involuntary movements, low sodium, and dehydration.    She reports experiencing involuntary movements, which she describes as similar to Tourette's syndrome. These symptoms began approximately 25 minutes prior to her arrival at the clinic and have been progressively worsening. She also experiences difficulty walking, talking, and has developed a shuffling gait over the past few months. She has not experienced any recent falls but reports tripping over the last 4 days. She expresses concern about potential serotonin syndrome due to her current symptoms of shaking, difficulty walking, and speech impairment. She also reports dizziness, nausea, and a lack of appetite for the past 7 to 10 days. She is not depressed. She has been unable to attend [employer] or perform her usual activities, including making her bed for the past 6 days. She is interested in accelerating the removal of serotonin from her brain. She has a known sensitivity to epinephrine and questions if there could be a correlation with her current symptoms. She has no history of seizures.    She took a dose of methylene blue on Friday/Saturday. She drank a smoothie on Saturday with this in it, which made her extremely hyper. She could not keep her feet still and had uncontrollable movement. A friend told her to take Benadryl, and within 15 to 20 minutes, her mind was clear. She did not take it again. She did not take any of her hormones because they also increased serotonin. She took her hormones last night because her mind was clear, " "but she was scared to drive today. She took 4 Benadryl last night, 2 at 10:00 PM and 2 more at 3:00 AM.    She believes she is currently experiencing severe dehydration. She was hospitalized in 08/2023 due to an electrolyte imbalance, specifically low sodium levels, which required a 3-day hospital stay. She is currently on a regimen of 1 g sodium tablet every other day but is currently struggling to maintain adequate hydration.    She has been struggling to walk and has started to shuffle in the last couple of months. Her hands are getting numb and partially paralyzed. She could not  anything to pick it up, but she could steer her car. For the last 6 weeks, it has been really difficult for her to touch text because her fingers just stop working.       I have reviewed and agree with the HPI and ROS information as above.  GALINDO Pastor     Objective   Vital Signs:   /72 (BP Location: Left arm, Patient Position: Sitting, Cuff Size: Adult)   Pulse 71   Temp 98.6 °F (37 °C) (Infrared)   Resp 18   Ht 157.5 cm (62\")   Wt 54.9 kg (121 lb)   SpO2 94%   BMI 22.13 kg/m²     BMI is within normal parameters. No other follow-up for BMI required.      Physical Exam  Vitals and nursing note reviewed.   Constitutional:       Appearance: Normal appearance. She is normal weight.   HENT:      Head: Normocephalic and atraumatic.   Eyes:      General: Lids are normal.      Conjunctiva/sclera: Conjunctivae normal.      Pupils: Pupils are equal, round, and reactive to light.   Cardiovascular:      Rate and Rhythm: Normal rate and regular rhythm.      Heart sounds: Normal heart sounds.   Pulmonary:      Effort: Pulmonary effort is normal.      Breath sounds: Normal breath sounds.   Lymphadenopathy:      Cervical: No cervical adenopathy.   Skin:     General: Skin is warm.   Neurological:      Mental Status: She is alert and oriented to person, place, and time. Mental status is at baseline.      Coordination: Coordination " normal.      Gait: Gait normal.               Assessment and Plan    Assessment & Plan  1. Involuntary movements:  - Symptoms do not align with serotonin syndrome; advised to discontinue the methylene blue that she believes may have caused these symptoms.  - Gait normal in office. I did not witness any involuntary movement.   - CMP ordered to assess electrolytes and kidney function.  - Fatigue and numbness in some extremities. Will check B12, Vit D, and magnesium as well. -She also reports taking four benadryl last night.    -She follows with neurology at [facility]. It looks like she saw [facility] 4/14/25 for memory loss. Would likely be beneficial to continue to follow with them, next appointment with them is 9/15/25. She is oriented to person, place, and time.     2. Hyponatremia:  - Sodium levels have been consistently low in the past. Sodium 3 months ago was in normal range at 137.   - Increased difficulty walking and numbness in hands.  - CMP ordered to assess electrolyte levels, kidney function, and potential dehydration.  - Results will be communicated promptly.    3. Dehydration:  - Reports feeling extremely dehydrated.  - Increased dizziness and nausea.  - CMP will be drawn  - Advised to continue oral hydration as tolerated    4. Fluid in ears:  - Fluid present in ears likely causing dizziness.  - Advised to take a daily antihistamine to alleviate symptoms.  - No signs of infection; antibiotics not necessary.    Diagnoses and all orders for this visit:    1. Dehydration (Primary)  -     CBC & Differential  -     Comprehensive metabolic panel  -     Magnesium    2. Involuntary movements  -     Magnesium    3. Other fatigue  -     Vitamin D,25-Hydroxy  -     Vitamin B12    4. Vitamin D deficiency  -     Vitamin D,25-Hydroxy        Patient or patient representative verbalized consent for the use of Ambient Listening during the visit with  GALINDO Pastor for chart documentation. 6/16/2025   13:10 CDT    Next OV scheduled for 9/24/25 with Dr. Valentin    Follow Up   Return if symptoms worsen or fail to improve.  Patient was given instructions and counseling regarding her condition or for health maintenance advice. Please see specific information pulled into the AVS if appropriate.

## 2025-06-16 NOTE — ED NOTES
Called Poison Control and spoke to Shari. She states the pts s/s does not match s/s associate with Methylene Blue. She suggests to add on a MetHb. Provider notified.

## 2025-06-17 ENCOUNTER — TELEPHONE (OUTPATIENT)
Dept: INTERNAL MEDICINE | Facility: CLINIC | Age: 85
End: 2025-06-17

## 2025-06-17 ENCOUNTER — TELEPHONE (OUTPATIENT)
Dept: NEUROLOGY | Age: 85
End: 2025-06-17

## 2025-06-17 NOTE — TELEPHONE ENCOUNTER
"Caller: Ricarda Hatch \"AGUEDA\"    Relationship: Self    Best call back number: [phone]     What is the medical concern/diagnosis: INVOLUNTARY  MOVEMENTS     What specialty or service is being requested: NEUROLOGY     What is the provider, practice or medical service name: [facility]     What is the office location: [place]     What is the office phone number:     Any additional details:           "

## 2025-06-17 NOTE — DISCHARGE INSTRUCTIONS
The workup today showed that you are not having a serotonin syndrome since our pharmacist was able to look at all of your medications and see now they reacted to methylene blue and none of them would have caused this.  Looked at these common side effects that we see with methylene blue and he did not have any of those.  However, most likely this is not a drug that you need to be taken since it caused you quite a bit of issues today.  But after your Benadryl dose here you were much better.  So make sure you dispose of the methylene blue appropriately.  Follow-up with your family doctor for reevaluation.  Return to ER if condition would worsen.

## 2025-06-17 NOTE — TELEPHONE ENCOUNTER
Paulette called to schedule an appointment for [facility] Discharge 6/16/25 for uncontrollable body movements. [facility] in the time frame needed. Please be advised that the best time to call Anytime.    Thank you.

## 2025-06-17 NOTE — TELEPHONE ENCOUNTER
Called and spoke with pt- advised she would need to seen [facility] neurology whom she is already established- advised just to try calling them on occasion to see if they have something sooner

## 2025-06-18 ENCOUNTER — APPOINTMENT (OUTPATIENT)
Dept: CT IMAGING | Age: 85
DRG: 884 | End: 2025-06-18
Payer: MEDICARE

## 2025-06-18 ENCOUNTER — APPOINTMENT (OUTPATIENT)
Dept: GENERAL RADIOLOGY | Age: 85
DRG: 884 | End: 2025-06-18
Payer: MEDICARE

## 2025-06-18 ENCOUNTER — HOSPITAL ENCOUNTER (INPATIENT)
Age: 85
LOS: 6 days | Discharge: HOME | DRG: 884 | End: 2025-06-24
Attending: PEDIATRICS | Admitting: HOSPITALIST
Payer: MEDICARE

## 2025-06-18 DIAGNOSIS — R41.82 ALTERED MENTAL STATUS, UNSPECIFIED ALTERED MENTAL STATUS TYPE: ICD-10-CM

## 2025-06-18 DIAGNOSIS — R07.9 CHEST PAIN, UNSPECIFIED TYPE: ICD-10-CM

## 2025-06-18 DIAGNOSIS — R25.9 ABNORMAL INVOLUNTARY MOVEMENT: Primary | ICD-10-CM

## 2025-06-18 PROBLEM — R45.1 MOTOR RESTLESSNESS: Status: ACTIVE | Noted: 2025-06-18

## 2025-06-18 LAB
ALBUMIN SERPL-MCNC: 4.1 G/DL (ref 3.5–5.2)
ALLENS TEST: ABNORMAL
ALP SERPL-CCNC: 73 U/L (ref 35–104)
ALT SERPL-CCNC: 13 U/L (ref 10–35)
AMMONIA PLAS-SCNC: 11 UMOL/L (ref 11–51)
AMPHET UR QL SCN: NEGATIVE
ANION GAP SERPL CALCULATED.3IONS-SCNC: 12 MMOL/L (ref 8–16)
AST SERPL-CCNC: 31 U/L (ref 10–35)
BARBITURATES UR QL SCN: NEGATIVE
BASE EXCESS ARTERIAL: 2.4 MMOL/L (ref -2–2)
BASOPHILS # BLD: 0.1 K/UL (ref 0–0.2)
BASOPHILS NFR BLD: 0.9 % (ref 0–1)
BENZODIAZ UR QL SCN: NEGATIVE
BILIRUB SERPL-MCNC: 0.5 MG/DL (ref 0.2–1.2)
BILIRUB UR QL STRIP: NEGATIVE
BUN SERPL-MCNC: 17 MG/DL (ref 8–23)
BUPRENORPHINE URINE: NEGATIVE
CALCIUM SERPL-MCNC: 9.5 MG/DL (ref 8.8–10.2)
CANNABINOIDS UR QL SCN: NEGATIVE
CARBOXYHEMOGLOBIN ARTERIAL: 0.7 % (ref 0–5)
CHLORIDE SERPL-SCNC: 100 MMOL/L (ref 98–107)
CK SERPL-CCNC: 393 U/L (ref 26–192)
CLARITY UR: CLEAR
CO2 SERPL-SCNC: 24 MMOL/L (ref 22–29)
COCAINE UR QL SCN: NEGATIVE
COLOR UR: YELLOW
CREAT SERPL-MCNC: 0.7 MG/DL (ref 0.5–0.9)
DRUG SCREEN COMMENT UR-IMP: NORMAL
EKG P AXIS: 28 DEGREES
EKG P AXIS: NORMAL DEGREES
EKG P-R INTERVAL: 164 MS
EKG P-R INTERVAL: NORMAL MS
EKG Q-T INTERVAL: 366 MS
EKG Q-T INTERVAL: 402 MS
EKG QRS DURATION: 104 MS
EKG QRS DURATION: 90 MS
EKG QTC CALCULATION (BAZETT): 417 MS
EKG QTC CALCULATION (BAZETT): 427 MS
EKG T AXIS: -106 DEGREES
EKG T AXIS: 59 DEGREES
EOSINOPHIL # BLD: 0.2 K/UL (ref 0–0.6)
EOSINOPHIL NFR BLD: 2.6 % (ref 0–5)
ERYTHROCYTE [DISTWIDTH] IN BLOOD BY AUTOMATED COUNT: 15 % (ref 11.5–14.5)
ETHANOLAMINE SERPL-MCNC: <11 MG/DL (ref 0–11)
FENTANYL SCREEN, URINE: NEGATIVE
FIO2: 21 %
GLUCOSE SERPL-MCNC: 76 MG/DL (ref 70–99)
GLUCOSE UR STRIP.AUTO-MCNC: NEGATIVE MG/DL
HBA1C MFR BLD: 5.6 % (ref 4–5.6)
HCO3 ARTERIAL: 26.4 MMOL/L (ref 22–26)
HCT VFR BLD AUTO: 34.4 % (ref 37–47)
HEMOGLOBIN, ART, EXTENDED: 11.7 G/DL (ref 12–16)
HGB BLD-MCNC: 11.7 G/DL (ref 12–16)
HGB UR STRIP.AUTO-MCNC: NEGATIVE MG/L
IMM GRANULOCYTES # BLD: 0 K/UL
KETONES UR STRIP.AUTO-MCNC: 40 MG/DL
LACTATE BLDV-SCNC: 1 MG/DL (ref 0.5–1.9)
LEUKOCYTE ESTERASE UR QL STRIP.AUTO: NEGATIVE
LYMPHOCYTES # BLD: 1.7 K/UL (ref 1.1–4.5)
LYMPHOCYTES NFR BLD: 28.9 % (ref 20–40)
MAGNESIUM SERPL-MCNC: 2 MG/DL (ref 1.6–2.4)
MCH RBC QN AUTO: 24.2 PG (ref 27–31)
MCHC RBC AUTO-ENTMCNC: 34 G/DL (ref 33–37)
MCV RBC AUTO: 71.1 FL (ref 81–99)
METHADONE UR QL SCN: NEGATIVE
METHAMPHETAMINE, URINE: NEGATIVE
METHEMOGLOBIN ARTERIAL: 0.7 %
MONOCYTES # BLD: 0.6 K/UL (ref 0–0.9)
MONOCYTES NFR BLD: 10.3 % (ref 0–10)
NEUTROPHILS # BLD: 3.3 K/UL (ref 1.5–7.5)
NEUTS SEG NFR BLD: 57 % (ref 50–65)
NITRITE UR QL STRIP.AUTO: NEGATIVE
O2 CONTENT ARTERIAL: 15.8 ML/DL
O2 SAT, ARTERIAL: 95.4 %
O2 THERAPY: ABNORMAL
OPIATES UR QL SCN: NEGATIVE
OXYCODONE UR QL SCN: NEGATIVE
PCO2 ARTERIAL: 38 MMHG (ref 35–45)
PCP UR QL SCN: NEGATIVE
PH ARTERIAL: 7.45 (ref 7.35–7.45)
PH UR STRIP.AUTO: 5.5 [PH] (ref 5–8)
PLATELET # BLD AUTO: 296 K/UL (ref 130–400)
PMV BLD AUTO: 9.6 FL (ref 9.4–12.3)
PO2 ARTERIAL: 78 MMHG (ref 80–100)
POTASSIUM BLD-SCNC: 3.7 MMOL/L
POTASSIUM SERPL-SCNC: 3.9 MMOL/L (ref 3.5–5.1)
PROT SERPL-MCNC: 6.6 G/DL (ref 6.4–8.3)
PROT UR STRIP.AUTO-MCNC: NEGATIVE MG/DL
RBC # BLD AUTO: 4.84 M/UL (ref 4.2–5.4)
SAMPLE SOURCE: ABNORMAL
SODIUM SERPL-SCNC: 136 MMOL/L (ref 136–145)
SP GR UR STRIP.AUTO: 1.02 (ref 1–1.03)
SPONT RATE(BPM): 22
T4 FREE SERPL-MCNC: 1.01 NG/DL (ref 0.93–1.7)
TRICYCLIC ANTIDEPRESSANTS, UR: NEGATIVE
TROPONIN, HIGH SENSITIVITY: 19 NG/L (ref 0–14)
TROPONIN, HIGH SENSITIVITY: 21 NG/L (ref 0–14)
TSH SERPL DL<=0.005 MIU/L-ACNC: 2.03 UIU/ML (ref 0.27–4.2)
UROBILINOGEN UR STRIP.AUTO-MCNC: 1 E.U./DL
WBC # BLD AUTO: 5.7 K/UL (ref 4.8–10.8)

## 2025-06-18 PROCEDURE — G0480 DRUG TEST DEF 1-7 CLASSES: HCPCS

## 2025-06-18 PROCEDURE — 84484 ASSAY OF TROPONIN QUANT: CPT

## 2025-06-18 PROCEDURE — 82077 ASSAY SPEC XCP UR&BREATH IA: CPT

## 2025-06-18 PROCEDURE — 84443 ASSAY THYROID STIM HORMONE: CPT

## 2025-06-18 PROCEDURE — 83036 HEMOGLOBIN GLYCOSYLATED A1C: CPT

## 2025-06-18 PROCEDURE — 71045 X-RAY EXAM CHEST 1 VIEW: CPT

## 2025-06-18 PROCEDURE — 80307 DRUG TEST PRSMV CHEM ANLYZR: CPT

## 2025-06-18 PROCEDURE — 6370000000 HC RX 637 (ALT 250 FOR IP): Performed by: NURSE PRACTITIONER

## 2025-06-18 PROCEDURE — 1200000000 HC SEMI PRIVATE

## 2025-06-18 PROCEDURE — 87040 BLOOD CULTURE FOR BACTERIA: CPT

## 2025-06-18 PROCEDURE — 99285 EMERGENCY DEPT VISIT HI MDM: CPT

## 2025-06-18 PROCEDURE — 70450 CT HEAD/BRAIN W/O DYE: CPT

## 2025-06-18 PROCEDURE — 2500000003 HC RX 250 WO HCPCS: Performed by: NURSE PRACTITIONER

## 2025-06-18 PROCEDURE — 82803 BLOOD GASES ANY COMBINATION: CPT

## 2025-06-18 PROCEDURE — 36600 WITHDRAWAL OF ARTERIAL BLOOD: CPT

## 2025-06-18 PROCEDURE — 82140 ASSAY OF AMMONIA: CPT

## 2025-06-18 PROCEDURE — 83735 ASSAY OF MAGNESIUM: CPT

## 2025-06-18 PROCEDURE — 85025 COMPLETE CBC W/AUTO DIFF WBC: CPT

## 2025-06-18 PROCEDURE — 83605 ASSAY OF LACTIC ACID: CPT

## 2025-06-18 PROCEDURE — 80053 COMPREHEN METABOLIC PANEL: CPT

## 2025-06-18 PROCEDURE — 82550 ASSAY OF CK (CPK): CPT

## 2025-06-18 PROCEDURE — 81003 URINALYSIS AUTO W/O SCOPE: CPT

## 2025-06-18 PROCEDURE — 84439 ASSAY OF FREE THYROXINE: CPT

## 2025-06-18 PROCEDURE — 93005 ELECTROCARDIOGRAM TRACING: CPT

## 2025-06-18 PROCEDURE — 36415 COLL VENOUS BLD VENIPUNCTURE: CPT

## 2025-06-18 RX ORDER — PROPRANOLOL HCL 20 MG
20 TABLET ORAL 2 TIMES DAILY
Status: DISCONTINUED | OUTPATIENT
Start: 2025-06-18 | End: 2025-06-24 | Stop reason: HOSPADM

## 2025-06-18 RX ORDER — ACETAMINOPHEN 325 MG/1
650 TABLET ORAL EVERY 6 HOURS PRN
Status: DISCONTINUED | OUTPATIENT
Start: 2025-06-18 | End: 2025-06-24 | Stop reason: HOSPADM

## 2025-06-18 RX ORDER — MECOBALAMIN 5000 MCG
10 TABLET,DISINTEGRATING ORAL NIGHTLY
Status: DISCONTINUED | OUTPATIENT
Start: 2025-06-18 | End: 2025-06-19

## 2025-06-18 RX ORDER — ACETAMINOPHEN 650 MG/1
650 SUPPOSITORY RECTAL EVERY 6 HOURS PRN
Status: DISCONTINUED | OUTPATIENT
Start: 2025-06-18 | End: 2025-06-24 | Stop reason: HOSPADM

## 2025-06-18 RX ORDER — POTASSIUM CHLORIDE 7.45 MG/ML
10 INJECTION INTRAVENOUS PRN
Status: DISCONTINUED | OUTPATIENT
Start: 2025-06-18 | End: 2025-06-24 | Stop reason: HOSPADM

## 2025-06-18 RX ORDER — MAGNESIUM SULFATE IN WATER 40 MG/ML
2000 INJECTION, SOLUTION INTRAVENOUS PRN
Status: DISCONTINUED | OUTPATIENT
Start: 2025-06-18 | End: 2025-06-24 | Stop reason: HOSPADM

## 2025-06-18 RX ORDER — SODIUM CHLORIDE 0.9 % (FLUSH) 0.9 %
5-40 SYRINGE (ML) INJECTION EVERY 12 HOURS SCHEDULED
Status: DISCONTINUED | OUTPATIENT
Start: 2025-06-18 | End: 2025-06-24 | Stop reason: HOSPADM

## 2025-06-18 RX ORDER — ONDANSETRON 4 MG/1
4 TABLET, ORALLY DISINTEGRATING ORAL EVERY 8 HOURS PRN
Status: DISCONTINUED | OUTPATIENT
Start: 2025-06-18 | End: 2025-06-24 | Stop reason: HOSPADM

## 2025-06-18 RX ORDER — SODIUM CHLORIDE 9 MG/ML
INJECTION, SOLUTION INTRAVENOUS PRN
Status: DISCONTINUED | OUTPATIENT
Start: 2025-06-18 | End: 2025-06-24 | Stop reason: HOSPADM

## 2025-06-18 RX ORDER — LORAZEPAM 0.5 MG/1
0.25 TABLET ORAL
Status: DISCONTINUED | OUTPATIENT
Start: 2025-06-18 | End: 2025-06-19

## 2025-06-18 RX ORDER — POLYETHYLENE GLYCOL 3350 17 G/17G
17 POWDER, FOR SOLUTION ORAL DAILY PRN
Status: DISCONTINUED | OUTPATIENT
Start: 2025-06-18 | End: 2025-06-24 | Stop reason: HOSPADM

## 2025-06-18 RX ORDER — ONDANSETRON 2 MG/ML
4 INJECTION INTRAMUSCULAR; INTRAVENOUS EVERY 6 HOURS PRN
Status: DISCONTINUED | OUTPATIENT
Start: 2025-06-18 | End: 2025-06-24 | Stop reason: HOSPADM

## 2025-06-18 RX ORDER — DONEPEZIL HYDROCHLORIDE 10 MG/1
5 TABLET, FILM COATED ORAL NIGHTLY
Status: DISCONTINUED | OUTPATIENT
Start: 2025-06-18 | End: 2025-06-19

## 2025-06-18 RX ORDER — POTASSIUM CHLORIDE 1500 MG/1
40 TABLET, EXTENDED RELEASE ORAL PRN
Status: DISCONTINUED | OUTPATIENT
Start: 2025-06-18 | End: 2025-06-24 | Stop reason: HOSPADM

## 2025-06-18 RX ORDER — SODIUM CHLORIDE 0.9 % (FLUSH) 0.9 %
5-40 SYRINGE (ML) INJECTION PRN
Status: DISCONTINUED | OUTPATIENT
Start: 2025-06-18 | End: 2025-06-24 | Stop reason: HOSPADM

## 2025-06-18 RX ORDER — MEMANTINE HYDROCHLORIDE 5 MG/1
10 TABLET ORAL 2 TIMES DAILY
Status: DISCONTINUED | OUTPATIENT
Start: 2025-06-18 | End: 2025-06-19

## 2025-06-18 RX ORDER — ENOXAPARIN SODIUM 100 MG/ML
40 INJECTION SUBCUTANEOUS DAILY
Status: DISCONTINUED | OUTPATIENT
Start: 2025-06-18 | End: 2025-06-24 | Stop reason: HOSPADM

## 2025-06-18 RX ADMIN — SODIUM CHLORIDE, PRESERVATIVE FREE 10 ML: 5 INJECTION INTRAVENOUS at 22:05

## 2025-06-18 RX ADMIN — PROPRANOLOL HYDROCHLORIDE 20 MG: 20 TABLET ORAL at 22:03

## 2025-06-18 ASSESSMENT — HEART SCORE: ECG: NON-SPECIFC REPOLARIZATION DISTURBANCE/LBTB/PM

## 2025-06-18 NOTE — ED NOTES
Per MD, patient to have swallow screen. If patient passes swallow screen, provide patient with meal tray.

## 2025-06-18 NOTE — TELEPHONE ENCOUNTER
Patient left a very lengthy voicemail requesting an appointment. She states she is in dire needs of care and has several new symptoms of which she needs to discuss with EG. Patient states that she is desperate for a diagnosis.

## 2025-06-18 NOTE — ED NOTES
ED TO INPATIENT SBAR HANDOFF    Patient Name: Paulette Sutton   : 1940  84 y.o.   Family/Caregiver Present: Yes  Code Status Order: Full Code    C-SSRS: Risk of Suicide: No Risk  Sitter No  Restraints:         Situation  Chief Complaint:   Chief Complaint   Patient presents with    Insomnia     Reports 9 hours of sleep in 3 days.  Also reports temors. Ems report she is taking methylene blue for a week     Altered Mental Status     Was seen at [facility] 2 days ago with full work-up and walk in clinic.     Patient Diagnosis: Motor restlessness [R45.1]     Brief Description of Patient's Condition: pT BEING ADMITTED FOR motor restlessness. Pt has had difficulty sleeping lately, and has been having involuntary movement with extremities. While in ED pt co of chest pain troponin 21. Pt is on RA confused at times but answers orientation questions appropriately. C/o of sensitivity to skin  Mental Status: alert  Arrived from: home    Imaging:   CT Head W/O Contrast   Final Result   1.  No acute intracranial abnormality.   2.  Chronic small vessel ischemic changes and atrophy.       ---------------------------        All CT scans are performed using dose optimization techniques as appropriate to the performed exam and include    at least one of the following: Automated exposure control, adjustment of the mA and/or kV according to size, and the use of iterative reconstruction technique.        ______________________________________    Electronically signed by: VALENCIA MATUTE M.D.   Date:     2025   Time:    12:12       XR CHEST PORTABLE   Final Result       1.  No acute findings in the chest.               ______________________________________    Electronically signed by: DANIELA ROBERTSON M.D.   Date:     2025   Time:    11:46       MRI BRAIN WO CONTRAST    (Results Pending)     COVID-19 Results:   Internal Administration   First Dose COVID-19, MODERNA BLUE border, Primary or Immunocompromised, (age 12y+), IM, 100  (Caution)  Factor Value    Calculated 6/18/2025 17:01 33% Age 84 years old    Deterioration Index Model 24% Neurological exam X     17% Supriya coma scale 14     14% Respiratory rate 20     4% Pulse oximetry 92 %     3% Sodium 136 mmol/L     2% Pulse 93     2% Blood pH 7.450     1% Hematocrit abnormal (34.4 %)     0% WBC count 5.7 K/uL     0% Potassium 3.9 mmol/L     0% Temperature 98.5 °F (36.9 °C)     0% Systolic 112       NPO? No  O2 Flow Rate:      Cardiac Rhythm: regular  NIH Score: NIH     Active LDA's:   Peripheral IV 06/18/25 Distal;Right Forearm (Active)   Site Assessment Clean, dry & intact;Clean;Dry;Intact 06/18/25 1303   Line Status Blood return noted;Brisk blood return;Flushed;Normal saline locked;Specimen collected 06/18/25 1303   Phlebitis Assessment No symptoms 06/18/25 1303   Infiltration Assessment 0 06/18/25 1303   Dressing Status New dressing applied 06/18/25 1303   Dressing Type Transparent 06/18/25 1303   Dressing Intervention New 06/18/25 1303     Pertinent or High Risk Medications/Drips: no   If Yes, please provide details:   Blood Product Administration: no  If Yes, please provide details:   Sepsis Risk Score      Admitted with Sepsis? no      Additional Interventions/Comments:     Recommendation  Incomplete orders:   Patient Belongings: with pt  Additional Comments:   If any further questions, please call Sending RN at [phone]    Electronically signed by: Electronically signed by Pawel Cobos RN on 6/18/2025 at 5:01 PM

## 2025-06-18 NOTE — ED PROVIDER NOTES
[facility] EMERGENCY DEPARTMENT  eMERGENCY dEPARTMENT eNCOUnter      Pt Name: Paulette Sutton  MRN: [id number]  Birthdate 1940  Date of evaluation: 6/18/2025  Provider: Dulce Maria Augustin MD    CHIEF COMPLAINT       Chief Complaint   Patient presents with    Insomnia     Reports 9 hours of sleep in 3 days.  Also reports temors. Ems report she is taking methylene blue for a week     Altered Mental Status     Was seen at [facility] 2 days ago with full work-up and walk in clinic.         HISTORY OF PRESENT ILLNESS   (Location/Symptom, Timing/Onset,Context/Setting, Quality, Duration, Modifying Factors, Severity)  Note limiting factors.   Paulette Sutton is a 84 y.o. female who presents to the emergency department with altered mental status.  Patient and daughter give history.  Patient states that symptoms began 4 days ago.  Patient has been seeing Dr Ara Min, neurologist, for short-term memory loss.  Patient states that 4 days ago \"I became confused.\"  Patient states that this is much worse than her normal baseline.  Patient states that she also began having involuntary movements on the right side of her body.  Patient states that symptoms seem to wax and wane over the past 4 days.  Patient states that it has gotten bad enough that she is \"not sleeping at night.\"  Patient states she was able to get no sleep last night and only 2 hours of sleep the night before.  Patient states that she is becoming paranoid and having difficulty focusing.  Patient states that she is normally very quiet and calm but \"I cussed out 3 police officers today.\"  Patient states that the officers \"could not understand what I was saying.\"  Patient states that she remembers being \"curious.\"  Patient has recently been on methylene blue \"to help with my memory loss issues.\"  This medication was started approximately 1 week ago.  Patient discontinued it 2 days ago.  Patient was seen 2 days ago in the emergency department at [facility].

## 2025-06-18 NOTE — H&P
[facility]      Hospitalist - History & Physical      PCP: Jolene Rodrigues MD    Date of Admission: 6/18/2025    Date of Service: 6/18/2025    Chief Complaint: Involuntary movement of right side of body    History Of Present Illness:   The patient is a 84 y.o. female who presented to [facility] ED for evaluation of involuntary movement of right side of her body. Pt has history of diabetes, hypothyroidism, thalassemia and hyperlipidemia.     Pt is here with her daughter who assists with history. Pt has had abnormal, involuntary movements primarily of the right side of her body experienced over past 5 days. She tells me that she recently started taking methyl blue for her memory. She has not been able to sleep the past two days due to symptoms. She reported to her daughter discomfort to skin. She has had no fevers or recent illness. She does not take an SSRI or antipsychotic medication. She has no history of movement disorder.     In ED, neurology consulted, troponin 21, UDS negative, CT head-no acute intracranial abnormality, creatinine 0.7/bun 17, magnesium 2.0. Pt is admitted inpatient to hospitalist.    Past Medical History:        Diagnosis Date    Heart murmur     HLD (hyperlipidemia)     Scoliosis     Thalassemia     Tobacco abuse, in remission     quit at age 30       Past Surgical History:        Procedure Laterality Date    LIVER BIOPSY N/A 2008    OVARY REMOVAL Bilateral 2016    removed for suspicious cysts       Home Medications:  Prior to Admission medications    Medication Sig Start Date End Date Taking? Authorizing Provider   memantine (NAMENDA) 10 MG tablet TAKE 1 TABLET BY MOUTH TWICE A DAY 6/3/25   Jolene Rodrigues MD   metFORMIN (GLUCOPHAGE) 500 MG tablet TAKE 1 TABLET BY MOUTH EVERY DAY WITH BREAKFAST 6/2/25   Jolene Rodrigues MD   donepezil (ARICEPT) 5 MG tablet Take 1 tablet by mouth nightly 4/14/25   Ara Min APRN   propranolol (INDERAL) 20 MG tablet TAKE 1

## 2025-06-18 NOTE — TELEPHONE ENCOUNTER
Reviewed ER records and PCP records. These are all new symptoms/complaints. She will need to be seen sooner

## 2025-06-18 NOTE — PROGRESS NOTES
4 Eyes Skin Assessment     NAME:  Paulette Sutton  YOB: 1940  MEDICAL RECORD NUMBER:  [id number]    The patient is being assessed for  Admission    I agree that at least one RN has performed a thorough Head to Toe Skin Assessment on the patient. ALL assessment sites listed below have been assessed.      Areas assessed by both nurses:    Head, Face, Ears, Shoulders, Back, Chest, Arms, Elbows, Hands, Sacrum. Buttock, Coccyx, Ischium, Legs. Feet and Heels, and Under Medical Devices         Does the Patient have a Wound? No noted wound(s)       Phillip Prevention initiated by RN: No  Wound Care Orders initiated by RN: No    Pressure Injury (Stage 3,4, Unstageable, DTI, NWPT, and Complex wounds) if present, place Wound referral order by RN under : No    New Ostomies, if present place, Ostomy referral order under : No     Nurse 1 eSignature: Electronically signed by Valentina Ren RN on 6/18/25 at 6:44 PM CDT    **SHARE this note so that the co-signing nurse can place an eSignature**    Nurse 2 eSignature: {Esignature:[id number]}

## 2025-06-19 ENCOUNTER — TELEPHONE (OUTPATIENT)
Dept: INTERNAL MEDICINE | Facility: CLINIC | Age: 85
End: 2025-06-19
Payer: MEDICARE

## 2025-06-19 ENCOUNTER — APPOINTMENT (OUTPATIENT)
Dept: MRI IMAGING | Age: 85
DRG: 884 | End: 2025-06-19
Payer: MEDICARE

## 2025-06-19 LAB
ALBUMIN SERPL-MCNC: 3.7 G/DL (ref 3.5–5.2)
ALP SERPL-CCNC: 67 U/L (ref 35–104)
ALT SERPL-CCNC: 14 U/L (ref 10–35)
AMMONIA PLAS-SCNC: 27 UMOL/L (ref 11–51)
ANION GAP SERPL CALCULATED.3IONS-SCNC: 10 MMOL/L (ref 8–16)
AST SERPL-CCNC: 34 U/L (ref 10–35)
BACTERIA BLD CULT ORG #2: NORMAL
BACTERIA BLD CULT: NORMAL
BILIRUB DIRECT SERPL-MCNC: 0.2 MG/DL (ref 0–0.3)
BILIRUB INDIRECT SERPL-MCNC: 0.2 MG/DL (ref 0–1)
BILIRUB SERPL-MCNC: 0.4 MG/DL (ref 0.2–1.2)
BUN SERPL-MCNC: 18 MG/DL (ref 8–23)
CALCIUM SERPL-MCNC: 9.1 MG/DL (ref 8.8–10.2)
CHLORIDE SERPL-SCNC: 102 MMOL/L (ref 98–107)
CO2 SERPL-SCNC: 26 MMOL/L (ref 22–29)
CREAT SERPL-MCNC: 0.7 MG/DL (ref 0.5–0.9)
ERYTHROCYTE [DISTWIDTH] IN BLOOD BY AUTOMATED COUNT: 15 % (ref 11.5–14.5)
GLUCOSE SERPL-MCNC: 103 MG/DL (ref 70–99)
HCT VFR BLD AUTO: 35.1 % (ref 37–47)
HGB BLD-MCNC: 11.9 G/DL (ref 12–16)
MCH RBC QN AUTO: 24.2 PG (ref 27–31)
MCHC RBC AUTO-ENTMCNC: 33.9 G/DL (ref 33–37)
MCV RBC AUTO: 71.5 FL (ref 81–99)
PLATELET # BLD AUTO: 288 K/UL (ref 130–400)
PMV BLD AUTO: 9.3 FL (ref 9.4–12.3)
POTASSIUM SERPL-SCNC: 4.1 MMOL/L (ref 3.5–5)
PROT SERPL-MCNC: 6.2 G/DL (ref 6.4–8.3)
RBC # BLD AUTO: 4.91 M/UL (ref 4.2–5.4)
SODIUM SERPL-SCNC: 138 MMOL/L (ref 136–145)
TROPONIN, HIGH SENSITIVITY: 17 NG/L (ref 0–14)
VIT B12 SERPL-MCNC: 2383 PG/ML (ref 232–1245)
WBC # BLD AUTO: 6.5 K/UL (ref 4.8–10.8)

## 2025-06-19 PROCEDURE — 1200000000 HC SEMI PRIVATE

## 2025-06-19 PROCEDURE — 6370000000 HC RX 637 (ALT 250 FOR IP): Performed by: NURSE PRACTITIONER

## 2025-06-19 PROCEDURE — 70551 MRI BRAIN STEM W/O DYE: CPT

## 2025-06-19 PROCEDURE — 80048 BASIC METABOLIC PNL TOTAL CA: CPT

## 2025-06-19 PROCEDURE — 36415 COLL VENOUS BLD VENIPUNCTURE: CPT

## 2025-06-19 PROCEDURE — 2500000003 HC RX 250 WO HCPCS: Performed by: NURSE PRACTITIONER

## 2025-06-19 PROCEDURE — 6370000000 HC RX 637 (ALT 250 FOR IP): Performed by: PSYCHIATRY & NEUROLOGY

## 2025-06-19 PROCEDURE — 84484 ASSAY OF TROPONIN QUANT: CPT

## 2025-06-19 PROCEDURE — 94760 N-INVAS EAR/PLS OXIMETRY 1: CPT

## 2025-06-19 PROCEDURE — 85027 COMPLETE CBC AUTOMATED: CPT

## 2025-06-19 PROCEDURE — 82607 VITAMIN B-12: CPT

## 2025-06-19 PROCEDURE — 6360000002 HC RX W HCPCS: Performed by: NURSE PRACTITIONER

## 2025-06-19 PROCEDURE — 99223 1ST HOSP IP/OBS HIGH 75: CPT | Performed by: PSYCHIATRY & NEUROLOGY

## 2025-06-19 PROCEDURE — 80076 HEPATIC FUNCTION PANEL: CPT

## 2025-06-19 PROCEDURE — 82140 ASSAY OF AMMONIA: CPT

## 2025-06-19 RX ORDER — LORAZEPAM 0.5 MG/1
0.5 TABLET ORAL EVERY 6 HOURS PRN
Status: DISCONTINUED | OUTPATIENT
Start: 2025-06-19 | End: 2025-06-19

## 2025-06-19 RX ORDER — LORAZEPAM 2 MG/ML
0.5 INJECTION INTRAMUSCULAR EVERY 6 HOURS PRN
Status: DISCONTINUED | OUTPATIENT
Start: 2025-06-19 | End: 2025-06-24 | Stop reason: HOSPADM

## 2025-06-19 RX ORDER — CYPROHEPTADINE HYDROCHLORIDE 4 MG/1
4 TABLET ORAL EVERY 6 HOURS
Status: DISCONTINUED | OUTPATIENT
Start: 2025-06-19 | End: 2025-06-20

## 2025-06-19 RX ADMIN — PROPRANOLOL HYDROCHLORIDE 20 MG: 20 TABLET ORAL at 22:36

## 2025-06-19 RX ADMIN — CYPROHEPTADINE HYDROCHLORIDE 4 MG: 4 TABLET ORAL at 17:04

## 2025-06-19 RX ADMIN — PROPRANOLOL HYDROCHLORIDE 20 MG: 20 TABLET ORAL at 08:21

## 2025-06-19 RX ADMIN — SODIUM CHLORIDE, PRESERVATIVE FREE 10 ML: 5 INJECTION INTRAVENOUS at 22:36

## 2025-06-19 ASSESSMENT — ENCOUNTER SYMPTOMS
ABDOMINAL PAIN: 0
DIARRHEA: 0
COLOR CHANGE: 0
SHORTNESS OF BREATH: 0
SORE THROAT: 0
VOMITING: 0
BACK PAIN: 0
COUGH: 0

## 2025-06-19 NOTE — PROGRESS NOTES
4 Eyes Skin Assessment     NAME:  Paulette Sutton  YOB: 1940  MEDICAL RECORD NUMBER:  [id number]    The patient is being assessed for  Admission    I agree that at least one RN has performed a thorough Head to Toe Skin Assessment on the patient. ALL assessment sites listed below have been assessed.      Areas assessed by both nurses:    Head, Face, Ears, Shoulders, Back, Chest, Arms, Elbows, Hands, Sacrum. Buttock, Coccyx, Ischium, Legs. Feet and Heels, and Under Medical Devices         Does the Patient have a Wound? No noted wound(s)       Phillip Prevention initiated by RN: No  Wound Care Orders initiated by RN: No    Pressure Injury (Stage 3,4, Unstageable, DTI, NWPT, and Complex wounds) if present, place Wound referral order by RN under : No    New Ostomies, if present place, Ostomy referral order under : No     Nurse 1 eSignature: Electronically signed by Linda Miner RN on 6/19/25 at 12:22 AM CDT    **SHARE this note so that the co-signing nurse can place an eSignature**    Nurse 2 eSignature: Electronically signed by Nela Hinton RN on 6/19/25 at 12:30 AM CDT

## 2025-06-19 NOTE — TELEPHONE ENCOUNTER
These requests will have to be addressed by the attending physician upon discharge, based on her finding and limitations at that time.

## 2025-06-19 NOTE — CONSULTS
[facility] Consult      Patient:   Paulette Sutton  MR#:    [id number]  Account Number:                   [id number]      Room:    18/518-01   YOB: 1940  Date of Progress Note: 6/19/2025  Time of Note                           1:52 PM  Attending Physician:  Nubia Kumar MD  Consulting Physician:  Enrique Chavez DO       CHIEF COMPLAINT:  Hyperkinetic movements      HISTORY OF PRESENT ILLNESS:   This is a 84 y.o. female who was admitted with hyperkinetic involuntary movements predominantly involving the right side of her body.  She has a history of diabetes, hypothyroidism, thalassemia and hyperlipidemia.  These abnormal movements started shortly after taking methylene blue for her memory loss.  She has noted some worsening confusion, insomnia, and hyper kinetic movements involving the right upper and lower extremities.  No overt fever has been noted.  No worsening headaches.  She is not on serotonergic or dopamine blocking medications at home.  She has no movement disorder history outside of possible essential tremor which she does take propranolol for.  She is also on multiple other supplement medications at home.    REVIEW OF SYSTEMS:  Constitutional - No fever or chills.    HENT -  No Scalp tenderness.  No tinnitus or significant hearing loss.  No nose bleeding, no sore throat.  Eyes - No sudden vision change or eye pain  Respiratory - No significant shortness of breath or cough  Cardiovascular - No chest pain. No palpitations or significant leg swelling  Gastrointestinal - No abdominal swelling or pain.    Genitourinary - No difficulty urinating, dysuria  Musculoskeletal - No back pain or myalgia.  Skin - No color change or rash  Neurologic - No seizures.  No lateralizing weakness.  No numbness.  Hematologic - No easy bruising or spontaneous bleeding.  Psychiatric - No anxiety.     PAST MEDICAL HISTORY:      Diagnosis Date    Heart murmur     HLD (hyperlipidemia)     Scoliosis      restricted diffusion.  The lateral ventricles and cortical sulci are prominent from atrophy.  The basal cisterns are patent.  Normal flow voids are identified within the basal cisterns.  The seventh and eighth cranial nerve complexes are normal.  Normal flow signal is identified within the dural venous sinuses. No acute hemorrhages are seen.  There is no mass effect.  There are no extraaxial collections. Mild scattered T2 high signal foci are seen within the supratentorial white matter.  There is an old lacunar type infarct seen within the left basal ganglia.  The craniocervical junction and midline structures are normal.  The soft tissues of the skull base and nasopharynx appear normal.  The paranasal sinuses and mastoid air cells are clear.      There is no acute cerebral infarction.  Stable moderate diffuse cerebral atrophy.  Mild chronic small vessel ischemic changes seen within the supratentorial white matter.   ______________________________________ Electronically signed by: SOMMER HILLS M.D. Date:     06/19/2025 Time:    10:06     CT Head W/O Contrast  Result Date: 6/18/2025  EXAM:  CT HEAD WITHOUT CONTRAST.  HISTORY:  Altered mental status.  COMPARISON:  12/03/2024.  TECHNIQUE:  Multiple axial images of the brain were obtained from the skull base through the vertex without intravenous contrast. Multiplanar reformats were provided.  FINDINGS:  There is no intracranial hemorrhage or extraaxial collection.  The gray-white differentiation is maintained without evidence for acute large vascular territory infarction.  There are areas of periventricular and subcortical white matter low attenuation.  The cortical sulci and cerebral ventricles are symmetrically enlarged.  The basal cisterns are well visualized.  There is no hydrocephalus, mass effect, or midline shift.  Mild inferior maxillary sinus mucosal thickening noted bilaterally.  Otherwise, the paranasal sinuses and mastoid air cells are clear.  The calvarium  serotonin syndrome.   Thyroid normal.  CPK slightly elevated. MRI brain negative.      PLAN:   Methylene blue has been stopped, holding all supplements, not currently on any serotonergic or dopamine blocking medications. Will hold namenda, aricept, and melatonin as well.    Will start periactin 4 mg q6h    Continue low dose ativan prn    Additional labs    EEG     Please feel free to call with any questions.   [phone] (cell phone).    Enrique Chavez,   Board Certified Neurology

## 2025-06-19 NOTE — PROGRESS NOTES
Progress Note    Date:2025       Room:0518/518-01  Patient Name:Paulette Sutton     YOB: 1940     Age:84 y.o.      Subjective    Subjective: 84 year old female who presented to [facility] ED for evaluation of involuntary movement of right side of her body. Pt has history of diabetes, hypothyroidism, thalassemia and hyperlipidemia.      Pt has had abnormal, involuntary movements primarily of the right side of her body experienced over past 5 days. Recently started taking methyl blue for her memory. She has not been able to sleep the past two days due to symptoms. She does not take an SSRI or antipsychotic medication. She has no history of movement disorder.      In ED, UDS negative, CT head-no acute intracranial abnormality, MRI brain obtained and negative. Awaiting neurology eval     Seen with daughter present, no new/acute overnight issues     Review of Systems: 10 point system reviewed and negative except as stated above.    Objective         Vitals Last 24 Hours:  TEMPERATURE:  Temp  Av.8 °F (36.6 °C)  Min: 96.8 °F (36 °C)  Max: 98.7 °F (37.1 °C)  RESPIRATIONS RANGE: Resp  Av.4  Min: 16  Max: 20  PULSE OXIMETRY RANGE: SpO2  Av.7 %  Min: 92 %  Max: 95 %  PULSE RANGE: Pulse  Av  Min: 75  Max: 93  BLOOD PRESSURE RANGE: Systolic (24hrs), Av , Min:112 , Max:132   ; Diastolic (24hrs), Av, Min:68, Max:96    I/O (24Hr):    Intake/Output Summary (Last 24 hours) at 2025 1540  Last data filed at 2025 0508  Gross per 24 hour   Intake 400 ml   Output --   Net 400 ml       Physical Examination:  General: Well-developed, no acute distress lying comfortably in bed.  HEENT: Atraumatic normocephalic, range of motion normal, no tracheal deviation noted.  Cardiac: Normal S1-S2 no murmurs   Respiratory: clear To auscultation bilaterally, no rhonchi or rales, no wheezing  Abdomen: Soft, positive bowel sounds in all quadrants, no distention, nontender to palpation, no rebound noted.   normal.  The paranasal sinuses and mastoid air cells are clear.      There is no acute cerebral infarction.  Stable moderate diffuse cerebral atrophy.  Mild chronic small vessel ischemic changes seen within the supratentorial white matter.   ______________________________________ Electronically signed by: SOMMER HILLS M.D. Date:     06/19/2025 Time:    10:06     CT Head W/O Contrast  Result Date: 6/18/2025  EXAM:  CT HEAD WITHOUT CONTRAST.  HISTORY:  Altered mental status.  COMPARISON:  12/03/2024.  TECHNIQUE:  Multiple axial images of the brain were obtained from the skull base through the vertex without intravenous contrast. Multiplanar reformats were provided.  FINDINGS:  There is no intracranial hemorrhage or extraaxial collection.  The gray-white differentiation is maintained without evidence for acute large vascular territory infarction.  There are areas of periventricular and subcortical white matter low attenuation.  The cortical sulci and cerebral ventricles are symmetrically enlarged.  The basal cisterns are well visualized.  There is no hydrocephalus, mass effect, or midline shift.  Mild inferior maxillary sinus mucosal thickening noted bilaterally.  Otherwise, the paranasal sinuses and mastoid air cells are clear.  The calvarium is intact.  Since the prior study, there has been no significant interval change.   -------------------------------    1.  No acute intracranial abnormality. 2.  Chronic small vessel ischemic changes and atrophy.  ---------------------------  All CT scans are performed using dose optimization techniques as appropriate to the performed exam and include at least one of the following: Automated exposure control, adjustment of the mA and/or kV according to size, and the use of iterative reconstruction technique.  ______________________________________ Electronically signed by: VALENCIA MATUTE M.D. Date:     06/18/2025 Time:    12:12     XR CHEST PORTABLE  Result Date: 6/18/2025  EXAM:  CHEST RADIOGRAPH  TECHNIQUE: Single frontal chest radiograph.  HISTORY: Altered mental status.  COMPARISON: 05/05/2022  FINDINGS:   No pulmonary infiltrate is identified. No pleural effusion or pneumothorax is seen. Stable biapical pleural thickening. Heart size is normal.  Stable moderate size hiatal hernia. No acute displaced rib fractures are identified. 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       1.  No acute findings in the chest.    ______________________________________ Electronically signed by: DANIELA ROBERTSON M.D. Date:     06/18/2025 Time:    11:46     Assessment//Plan           Hospital Problems           Last Modified POA    * (Principal) Motor restlessness 6/18/2025 Yes     Assessment & Plan    Involuntary Hyperkinetic right sided movements  Workup so far unremarkable  MRI and CT head negative  Likely related to methylene blue  Neuro eval       Essential Tremor  Propanolol    Elevated CK  Encourage oral hydration      Dispo: TBD    Electronically signed by Nubia Kumar MD on 6/19/25 at 3:40 PM CDT

## 2025-06-19 NOTE — PROGRESS NOTES
Attempted patients EEG but she was off the floor. Okay per Dr. Chavez for EEG to be done tomorrow. jaime

## 2025-06-19 NOTE — PLAN OF CARE
Problem: Chronic Conditions and Co-morbidities  Goal: Patient's chronic conditions and co-morbidity symptoms are monitored and maintained or improved  6/18/2025 2251 by Linda Miner RN  Outcome: Progressing  Flowsheets (Taken 6/18/2025 2100)  Care Plan - Patient's Chronic Conditions and Co-Morbidity Symptoms are Monitored and Maintained or Improved:   Monitor and assess patient's chronic conditions and comorbid symptoms for stability, deterioration, or improvement   Collaborate with multidisciplinary team to address chronic and comorbid conditions and prevent exacerbation or deterioration   Update acute care plan with appropriate goals if chronic or comorbid symptoms are exacerbated and prevent overall improvement and discharge  6/18/2025 1831 by Valentina Ren RN  Outcome: Progressing     Problem: Discharge Planning  Goal: Discharge to home or other facility with appropriate resources  6/18/2025 2251 by Linda Miner RN  Outcome: Progressing  Flowsheets (Taken 6/18/2025 2100)  Discharge to home or other facility with appropriate resources:   Identify barriers to discharge with patient and caregiver   Arrange for needed discharge resources and transportation as appropriate   Identify discharge learning needs (meds, wound care, etc)   Refer to discharge planning if patient needs post-hospital services based on physician order or complex needs related to functional status, cognitive ability or social support system  6/18/2025 1831 by Valentina Ren RN  Outcome: Progressing     Problem: Safety - Adult  Goal: Free from fall injury  Outcome: Progressing     Problem: ABCDS Injury Assessment  Goal: Absence of physical injury  Outcome: Progressing

## 2025-06-19 NOTE — TELEPHONE ENCOUNTER
Pt called stating she is admitted at [facility] and wanted to leave a message for Dr Valentin, she is requesting in home healthcare, a walker, and physical therapy/occupational.

## 2025-06-20 LAB
ANION GAP SERPL CALCULATED.3IONS-SCNC: 10 MMOL/L (ref 8–16)
BUN SERPL-MCNC: 12 MG/DL (ref 8–23)
CALCIUM SERPL-MCNC: 9 MG/DL (ref 8.8–10.2)
CHLORIDE SERPL-SCNC: 100 MMOL/L (ref 98–107)
CO2 SERPL-SCNC: 26 MMOL/L (ref 22–29)
CREAT SERPL-MCNC: 0.7 MG/DL (ref 0.5–0.9)
ERYTHROCYTE [DISTWIDTH] IN BLOOD BY AUTOMATED COUNT: 14.8 % (ref 11.5–14.5)
GLUCOSE SERPL-MCNC: 94 MG/DL (ref 70–99)
HCT VFR BLD AUTO: 33.8 % (ref 37–47)
HGB BLD-MCNC: 11.3 G/DL (ref 12–16)
MCH RBC QN AUTO: 23.7 PG (ref 27–31)
MCHC RBC AUTO-ENTMCNC: 33.4 G/DL (ref 33–37)
MCV RBC AUTO: 70.9 FL (ref 81–99)
PLATELET # BLD AUTO: 271 K/UL (ref 130–400)
PMV BLD AUTO: 9.7 FL (ref 9.4–12.3)
POTASSIUM SERPL-SCNC: 4.1 MMOL/L (ref 3.5–5)
RBC # BLD AUTO: 4.77 M/UL (ref 4.2–5.4)
SODIUM SERPL-SCNC: 136 MMOL/L (ref 136–145)
WBC # BLD AUTO: 5.9 K/UL (ref 4.8–10.8)

## 2025-06-20 PROCEDURE — 95816 EEG AWAKE AND DROWSY: CPT | Performed by: PSYCHIATRY & NEUROLOGY

## 2025-06-20 PROCEDURE — 36415 COLL VENOUS BLD VENIPUNCTURE: CPT

## 2025-06-20 PROCEDURE — 6360000002 HC RX W HCPCS: Performed by: NURSE PRACTITIONER

## 2025-06-20 PROCEDURE — 6360000002 HC RX W HCPCS: Performed by: PSYCHIATRY & NEUROLOGY

## 2025-06-20 PROCEDURE — 2500000003 HC RX 250 WO HCPCS: Performed by: NURSE PRACTITIONER

## 2025-06-20 PROCEDURE — 95816 EEG AWAKE AND DROWSY: CPT

## 2025-06-20 PROCEDURE — 94760 N-INVAS EAR/PLS OXIMETRY 1: CPT

## 2025-06-20 PROCEDURE — 99232 SBSQ HOSP IP/OBS MODERATE 35: CPT | Performed by: PSYCHIATRY & NEUROLOGY

## 2025-06-20 PROCEDURE — 80048 BASIC METABOLIC PNL TOTAL CA: CPT

## 2025-06-20 PROCEDURE — 6370000000 HC RX 637 (ALT 250 FOR IP): Performed by: NURSE PRACTITIONER

## 2025-06-20 PROCEDURE — 85027 COMPLETE CBC AUTOMATED: CPT

## 2025-06-20 PROCEDURE — 1200000000 HC SEMI PRIVATE

## 2025-06-20 RX ADMIN — PROPRANOLOL HYDROCHLORIDE 20 MG: 20 TABLET ORAL at 09:18

## 2025-06-20 RX ADMIN — Medication 0.5 MG: at 14:41

## 2025-06-20 RX ADMIN — SODIUM CHLORIDE, PRESERVATIVE FREE 10 ML: 5 INJECTION INTRAVENOUS at 09:18

## 2025-06-20 RX ADMIN — SODIUM CHLORIDE, PRESERVATIVE FREE 10 ML: 5 INJECTION INTRAVENOUS at 22:29

## 2025-06-20 NOTE — PROGRESS NOTES
midline structures are normal.  The soft tissues of the skull base and nasopharynx appear normal.  The paranasal sinuses and mastoid air cells are clear.      There is no acute cerebral infarction.  Stable moderate diffuse cerebral atrophy.  Mild chronic small vessel ischemic changes seen within the supratentorial white matter.   ______________________________________ Electronically signed by: SOMMER HILLS M.D. Date:     06/19/2025 Time:    10:06     CT Head W/O Contrast  Result Date: 6/18/2025  EXAM:  CT HEAD WITHOUT CONTRAST.  HISTORY:  Altered mental status.  COMPARISON:  12/03/2024.  TECHNIQUE:  Multiple axial images of the brain were obtained from the skull base through the vertex without intravenous contrast. Multiplanar reformats were provided.  FINDINGS:  There is no intracranial hemorrhage or extraaxial collection.  The gray-white differentiation is maintained without evidence for acute large vascular territory infarction.  There are areas of periventricular and subcortical white matter low attenuation.  The cortical sulci and cerebral ventricles are symmetrically enlarged.  The basal cisterns are well visualized.  There is no hydrocephalus, mass effect, or midline shift.  Mild inferior maxillary sinus mucosal thickening noted bilaterally.  Otherwise, the paranasal sinuses and mastoid air cells are clear.  The calvarium is intact.  Since the prior study, there has been no significant interval change.   -------------------------------    1.  No acute intracranial abnormality. 2.  Chronic small vessel ischemic changes and atrophy.  ---------------------------  All CT scans are performed using dose optimization techniques as appropriate to the performed exam and include at least one of the following: Automated exposure control, adjustment of the mA and/or kV according to size, and the use of iterative reconstruction technique.  ______________________________________ Electronically signed by: VALENCIA MATUTE M.D.  Date:     06/18/2025 Time:    12:12     XR CHEST PORTABLE  Result Date: 6/18/2025  EXAM: CHEST RADIOGRAPH  TECHNIQUE: Single frontal chest radiograph.  HISTORY: Altered mental status.  COMPARISON: 05/05/2022  FINDINGS:   No pulmonary infiltrate is identified. No pleural effusion or pneumothorax is seen. Stable biapical pleural thickening. Heart size is normal.  Stable moderate size hiatal hernia. No acute displaced rib fractures are identified. 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       1.  No acute findings in the chest.    ______________________________________ Electronically signed by: DANIELA ROBERTSON M.D. Date:     06/18/2025 Time:    11:46     Assessment//Plan           Hospital Problems           Last Modified POA    * (Principal) Motor restlessness 6/18/2025 Yes     Assessment & Plan    Involuntary Hyperkinetic right sided movements  Workup so far unremarkable  MRI and CT head negative  Likely related to methylene blue  Neuro following; recc psych eval  EEG pending  PT/OT      Essential Tremor  Propanolol    Elevated CK  Encourage oral hydration      Dispo: TBD    Electronically signed by   Nubia Kumar MD   Internal Medicine Hospitalist  On 6/20/2025  At 1:18 PM    EMR Dragon/Transcription disclaimer:   Much of this encounter note is an electronic transcription/translation of spoken language to printed text. The electronic translation of spoken language may permit erroneous, or at times, nonsensical words or phrases to be inadvertently transcribed; although attempts have made to review the note for such errors, some may still exist.

## 2025-06-20 NOTE — PROGRESS NOTES
[facility] Neurology Progress Note      Patient:   Paulette Sutton  MR#:    [id number]   Room:    [facility]/518-01   YOB: 1940  Date of Progress Note: 6/20/2025  Time of Note                           11:18 AM  Consulting Physician:  Enrique Chavez DO  Attending Physician:  Nubia Kumar MD      INTERVAL HISTORY:  No acute events, movements seem better but akathisia still noted, some worsening anxiety, agitation noted     REVIEW OF SYSTEMS:  Constitutional: No fevers No chills  Neck:No stiffness  Respiratory: No shortness of breath  Cardiovascular: No chest pain No palpitations  Gastrointestinal: No abdominal pain    Genitourinary: No Dysuria  Neurological: No headache, no confusion    PHYSICAL EXAM:    Constitutional -   /89   Pulse 67   Temp 97 °F (36.1 °C) (Temporal)   Resp 16   Ht 1.575 m (5' 2\")   Wt 53.5 kg (118 lb)   SpO2 95%   BMI 21.58 kg/m²   General appearance: No acute distress   EYES -   Conjunctiva normal  Pupillary exam as below, see CN exam in the neurologic exam  ENT-    No scars, masses, or lesions over external nose or ears  Oropharynx without erythema, palate midline  Cardiovascular -   No clubbing, cyanosis, or edema   Pulmonary-   Good expansion, normal effort without use of accessory muscles  Musculoskeletal -   No significant wasting of muscles noted  Gait as below, see gait exam in the neurologic exam  Muscle strength, tone, stability as below see the motor exam in the neurologic exam.   No bony deformities  Skin -   Warm, dry, and intact to inspection and palpation.    No rash, erythema, or pallor  Psychiatric -   Mood, affect, and behavior appear normal    Memory as below see mental status examination in the neurologic exam        NEUROLOGICAL EXAM     Mental status    [x] Awake, alert, oriented   [x] Affect attention and concentration appear appropriate  [x] Recent and remote memory appears unremarkable  [x] Speech normal without dysarthria or aphasia,  scattered T2 high signal foci are seen within the supratentorial white matter.  There is an old lacunar type infarct seen within the left basal ganglia.  The craniocervical junction and midline structures are normal.  The soft tissues of the skull base and nasopharynx appear normal.  The paranasal sinuses and mastoid air cells are clear.      There is no acute cerebral infarction.  Stable moderate diffuse cerebral atrophy.  Mild chronic small vessel ischemic changes seen within the supratentorial white matter.   ______________________________________ Electronically signed by: SOMMER HILLS M.D. Date:     06/19/2025 Time:    10:06     CT Head W/O Contrast  Result Date: 6/18/2025  EXAM:  CT HEAD WITHOUT CONTRAST.  HISTORY:  Altered mental status.  COMPARISON:  12/03/2024.  TECHNIQUE:  Multiple axial images of the brain were obtained from the skull base through the vertex without intravenous contrast. Multiplanar reformats were provided.  FINDINGS:  There is no intracranial hemorrhage or extraaxial collection.  The gray-white differentiation is maintained without evidence for acute large vascular territory infarction.  There are areas of periventricular and subcortical white matter low attenuation.  The cortical sulci and cerebral ventricles are symmetrically enlarged.  The basal cisterns are well visualized.  There is no hydrocephalus, mass effect, or midline shift.  Mild inferior maxillary sinus mucosal thickening noted bilaterally.  Otherwise, the paranasal sinuses and mastoid air cells are clear.  The calvarium is intact.  Since the prior study, there has been no significant interval change.   -------------------------------    1.  No acute intracranial abnormality. 2.  Chronic small vessel ischemic changes and atrophy.  ---------------------------  All CT scans are performed using dose optimization techniques as appropriate to the performed exam and include at least one of the following: Automated exposure control,  adjustment of the mA and/or kV according to size, and the use of iterative reconstruction technique.  ______________________________________ Electronically signed by: VALENCIA MATUTE M.D. Date:     06/18/2025 Time:    12:12     XR CHEST PORTABLE  Result Date: 6/18/2025  EXAM: CHEST RADIOGRAPH  TECHNIQUE: Single frontal chest radiograph.  HISTORY: Altered mental status.  COMPARISON: 05/05/2022  FINDINGS:   No pulmonary infiltrate is identified. No pleural effusion or pneumothorax is seen. Stable biapical pleural thickening. Heart size is normal.  Stable moderate size hiatal hernia. No acute displaced rib fractures are identified. 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       1.  No acute findings in the chest.    ______________________________________ Electronically signed by: DANIELA ROBERTSON M.D. Date:     06/18/2025 Time:    11:46       Lab Results   Component Value Date    WBC 5.9 06/20/2025    HGB 11.3 (L) 06/20/2025    HCT 33.8 (L) 06/20/2025    MCV 70.9 (L) 06/20/2025     06/20/2025     Lab Results   Component Value Date     06/20/2025    K 4.1 06/20/2025     06/20/2025    CO2 26 06/20/2025    BUN 12 06/20/2025    CREATININE 0.7 06/20/2025    GLUCOSE 94 06/20/2025    CALCIUM 9.0 06/20/2025    BILITOT 0.4 06/19/2025    ALKPHOS 67 06/19/2025    AST 34 06/19/2025    ALT 14 06/19/2025    LABGLOM 86 06/20/2025       RECORD REVIEW:   Previous medical records, medications were reviewed at today's visit.  Nursing/physician notes, imaging, labs and vitals reviewed. PT,OT and/or speech notes reviewed      ASSESSMENT:  84 y.o. admitted with hyperkinetic movements involving the right side / akathisia, insomnia, mild agitation and confusion, recent exposure to methylene blue amongst other supplement medications.  Suspect medication reaction.  Methylene blue can be associated with serotonin syndrome.   Thyroid normal.  CPK slightly elevated. MRI brain negative.  Anxiety,

## 2025-06-21 PROBLEM — R25.9 ABNORMAL INVOLUNTARY MOVEMENT: Status: ACTIVE | Noted: 2025-06-21

## 2025-06-21 PROBLEM — F41.9 ANXIETY: Status: ACTIVE | Noted: 2025-06-21

## 2025-06-21 LAB
ANION GAP SERPL CALCULATED.3IONS-SCNC: 8 MMOL/L (ref 8–16)
BUN SERPL-MCNC: 21 MG/DL (ref 8–23)
CALCIUM SERPL-MCNC: 8.9 MG/DL (ref 8.8–10.2)
CHLORIDE SERPL-SCNC: 101 MMOL/L (ref 98–107)
CO2 SERPL-SCNC: 26 MMOL/L (ref 22–29)
CREAT SERPL-MCNC: 0.7 MG/DL (ref 0.5–0.9)
ERYTHROCYTE [DISTWIDTH] IN BLOOD BY AUTOMATED COUNT: 14.6 % (ref 11.5–14.5)
GLUCOSE SERPL-MCNC: 94 MG/DL (ref 70–99)
HCT VFR BLD AUTO: 32.9 % (ref 37–47)
HGB BLD-MCNC: 11 G/DL (ref 12–16)
MCH RBC QN AUTO: 24 PG (ref 27–31)
MCHC RBC AUTO-ENTMCNC: 33.4 G/DL (ref 33–37)
MCV RBC AUTO: 71.8 FL (ref 81–99)
PLATELET # BLD AUTO: 281 K/UL (ref 130–400)
PMV BLD AUTO: 10.4 FL (ref 9.4–12.3)
POTASSIUM SERPL-SCNC: 3.9 MMOL/L (ref 3.5–5)
RBC # BLD AUTO: 4.58 M/UL (ref 4.2–5.4)
SODIUM SERPL-SCNC: 135 MMOL/L (ref 136–145)
WBC # BLD AUTO: 5.7 K/UL (ref 4.8–10.8)

## 2025-06-21 PROCEDURE — 97530 THERAPEUTIC ACTIVITIES: CPT

## 2025-06-21 PROCEDURE — 80048 BASIC METABOLIC PNL TOTAL CA: CPT

## 2025-06-21 PROCEDURE — 1200000000 HC SEMI PRIVATE

## 2025-06-21 PROCEDURE — 36415 COLL VENOUS BLD VENIPUNCTURE: CPT

## 2025-06-21 PROCEDURE — 2500000003 HC RX 250 WO HCPCS: Performed by: NURSE PRACTITIONER

## 2025-06-21 PROCEDURE — 97116 GAIT TRAINING THERAPY: CPT

## 2025-06-21 PROCEDURE — 6360000002 HC RX W HCPCS: Performed by: PSYCHIATRY & NEUROLOGY

## 2025-06-21 PROCEDURE — 97161 PT EVAL LOW COMPLEX 20 MIN: CPT

## 2025-06-21 PROCEDURE — 85027 COMPLETE CBC AUTOMATED: CPT

## 2025-06-21 PROCEDURE — 99232 SBSQ HOSP IP/OBS MODERATE 35: CPT | Performed by: PSYCHIATRY & NEUROLOGY

## 2025-06-21 PROCEDURE — 6370000000 HC RX 637 (ALT 250 FOR IP): Performed by: NURSE PRACTITIONER

## 2025-06-21 PROCEDURE — 99221 1ST HOSP IP/OBS SF/LOW 40: CPT | Performed by: PSYCHIATRY & NEUROLOGY

## 2025-06-21 RX ADMIN — PROPRANOLOL HYDROCHLORIDE 20 MG: 20 TABLET ORAL at 07:52

## 2025-06-21 RX ADMIN — Medication 0.5 MG: at 21:26

## 2025-06-21 RX ADMIN — SODIUM CHLORIDE, PRESERVATIVE FREE 10 ML: 5 INJECTION INTRAVENOUS at 07:52

## 2025-06-21 RX ADMIN — Medication 0.5 MG: at 14:10

## 2025-06-21 RX ADMIN — Medication 0.5 MG: at 00:40

## 2025-06-21 RX ADMIN — SODIUM CHLORIDE, PRESERVATIVE FREE 10 ML: 5 INJECTION INTRAVENOUS at 21:28

## 2025-06-21 NOTE — RESEARCH
Collateral obtained from: Janelle Pyle (Child) [phone]    Immediate Stressors & Time Episode Began: SW spoke with patient and Daughter at beside in person. Patient signed a PHI for Daughter. Daughter reports \"she needs to be able to walk and gain strength back before she is discharges.\" Daughter reports that she has short term memory loss and has not had any problems taking medication. Daughter feels comfortable with patient returning home when she is able to walk alone.     As for patients mental health background. Patient reports that she has had hx of depression when her mother was killed. Reports that even with her mental health struggle she was able to funtion no matter how hopeless/helpless she felt at the time. Reports that she has not been depressed since age 31.     Substance Abuse: caller reports no substance abuse history    Safety Issues: The importance of locking weapons in a secured location or removing from home was explained and recommended to collateral caller. Patient reports that she has a old gun in the home but is unknown to where it is at this time. Encouraged Daughter to remove weapons prior to patient returning home.     Support system/Medication Managed by: The importance of medication management and locking extra medication in a secured location was explained and recommended to collateral caller.     Discharge Disposition: home -lives alone    Additional Info: Daughter and Patient are requesting to speak to social work regarding PT, OT and home health for when patient is discharged home.    SW informed nurse in charge of care of the above request

## 2025-06-21 NOTE — CONSULTS
[facility]  Psychiatry Consult    Reason for Consult: Anxiety, psychogenic movements  84 y.o. female with history of anxiety, diabetes, hypothyroidism, thalassemia, essential tremor, who was admitted with hyperkinetic involuntary movements involving the right side of her body.  These abnormal movements started shortly after taking methylene blue for her memory loss.  She noted some worsening confusion, insomnia, and hyper kinetic movements involving the right upper and lower extremities.  UDS negative.  No evidence of UTI.  No acute changes per MRI brain.  Notable microvascular ischemia.  EEG unremarkable.  She was started on Periactin by neurology due to concern for serotonin syndrome, but did not tolerate it.  She is on low-dose Ativan PRN.    Patient is observed resting in bed.  She is pleasantly confused.  Poor historian.  She is tangential.  Somewhat intrusive.  States she has been more anxious than usual.  Denies depression.  Denies suicidal ideation.  She lives alone.  She has 4 children.  States her daughter has been visiting her at the hospital.  When asked, who is helping her at home, she is unable to provide a clear answer.  Appears to be confabulating.    Psychiatric History:    Diagnoses: Anxiety  Suicide attempts/gestures: Denies   Prior hospitalizations: Denies   Medication trials: Denies   Mental health contact: Lost to follow-up   Head trauma: Denies    Substance Use History:  Denies alcohol and illicits    Allergies:  Epinephrine and Sulfa antibiotics    Medical History:  Past Medical History:   Diagnosis Date    Heart murmur     HLD (hyperlipidemia)     Scoliosis     Thalassemia     Tobacco abuse, in remission     quit at age 30       Family History:  Family History   Problem Relation Age of Onset    Other Mother          in MVA at age 30 years old    Other Father         chose to die and stopped eating at age 93 years, it took 6 weeks to die    Scoliosis Father

## 2025-06-21 NOTE — PROCEDURES
[address] [place] [address]                       ELECTROENCEPHALOGRAM REPORT      PATIENT NAME: LYSSA ROBISON                 : 1940  MED REC NO: [id number]                          ROOM: 0518  ACCOUNT NO: [id number]                       ADMIT DATE: 2025  PROVIDER: Nelson Burroughs MD      DATE OF SERVICE:  2025    INDICATION FOR TEST:  Altered mental status.    DESCRIPTION:  The waking background consists of a rhythmic and symmetric well-formed and well regulated posterior dominant 8 hertz activity.  During drowsiness, background frequency decreased by 1 to 2 hertz.  Stage 2 sleep is not seen.  Photic stimulation produced no abnormalities.  No epileptiform discharges nor any focal lateralizing slowing was noted.    IMPRESSION:  Normal awake and drowsy-appearing EEG.  Correlate clinically.          NELSON BURROUGHS MD      D:  2025 12:55:48     T:  2025 13:08:32     SUSHANT/SRIKANTH  Job #:  [id number]     Doc#:  [id number]

## 2025-06-21 NOTE — PROGRESS NOTES
[facility] Neurology Progress Note      Patient:   Paulette Sutton  MR#:    [id number]   Room:    [facility]/518-01   YOB: 1940  Date of Progress Note: 6/21/2025  Time of Note                           11:56 AM  Consulting Physician:  Enrique Chavez DO  Attending Physician:  Nubia Kumar MD      INTERVAL HISTORY:  No acute events, movements seem better but akathisia still noted, some worsening anxiety, agitation noted.  No complaints    REVIEW OF SYSTEMS:  Constitutional: No fevers No chills  Neck:No stiffness  Respiratory: No shortness of breath  Cardiovascular: No chest pain No palpitations  Gastrointestinal: No abdominal pain    Genitourinary: No Dysuria  Neurological: No headache, no confusion    PHYSICAL EXAM:    Constitutional -   /77   Pulse 91   Temp 97.2 °F (36.2 °C) (Temporal)   Resp 18   Ht 1.575 m (5' 2\")   Wt 53.5 kg (118 lb)   SpO2 97%   BMI 21.58 kg/m²   General appearance: No acute distress   EYES -   Conjunctiva normal  Pupillary exam as below, see CN exam in the neurologic exam  ENT-    No scars, masses, or lesions over external nose or ears  Oropharynx without erythema, palate midline  Cardiovascular -   No clubbing, cyanosis, or edema   Pulmonary-   Good expansion, normal effort without use of accessory muscles  Musculoskeletal -   No significant wasting of muscles noted  Gait as below, see gait exam in the neurologic exam  Muscle strength, tone, stability as below see the motor exam in the neurologic exam.   No bony deformities  Skin -   Warm, dry, and intact to inspection and palpation.    No rash, erythema, or pallor  Psychiatric -   Mood, affect, and behavior appear normal    Memory as below see mental status examination in the neurologic exam        NEUROLOGICAL EXAM     Mental status    [x] Awake, alert, oriented   [x] Affect attention and concentration appear appropriate  [x] Recent and remote memory appears unremarkable  [x] Speech normal without dysarthria or

## 2025-06-21 NOTE — PROGRESS NOTES
Physical Therapy  Facility/Department: [facility] SURG SERVICES  Physical Therapy Initial Assessment    Name: Paulette Sutton  : 1940  MRN: [id number]  Date of Service: 2025    Discharge Recommendations:  Continue to assess pending progress, Patient would benefit from continued therapy after discharge (WILL CONTINUE TO ASSESS FOR SAFE DC PLAN. QUESTION pt'S ABILITY TO DC HOME ALONE AT CURRENT LEVEL OF FUNCTION AND HER MENTAL STATUS.)          Patient Diagnosis(es): The primary encounter diagnosis was Abnormal involuntary movement. Diagnoses of Altered mental status, unspecified altered mental status type and Chest pain, unspecified type were also pertinent to this visit.  Past Medical History:  has a past medical history of Heart murmur, HLD (hyperlipidemia), Scoliosis, Thalassemia, and Tobacco abuse, in remission.  Past Surgical History:  has a past surgical history that includes Ovary removal (Bilateral, ) and liver biopsy (N/A, ).    Assessment  Body Structures, Functions, Activity Limitations Requiring Skilled Therapeutic Intervention: Decreased functional mobility ;Decreased ROM;Decreased strength;Decreased endurance;Increased pain  Assessment: pt WOULD BENEFIT FROM SKILLED PT IN THIS SETTING TO PROGRESS HER MOBILITY AND ASSIST IN DC PLANNING  Therapy Prognosis: Good  Decision Making: Low Complexity  Requires PT Follow-Up: Yes  Activity Tolerance  Activity Tolerance: Patient tolerated treatment well    Plan  Physical Therapy Plan  General Plan: 5-7 times per week  Therapy Duration: 2 Weeks  Current Treatment Recommendations: Strengthening, Balance training, Functional mobility training, Transfer training, Gait training, Safety education & training, Positioning, Pain management, Patient/Caregiver education & training, Cognitive reorientation, Therapeutic activities  Safety Devices  Type of Devices: Call light within reach, Gait belt, Bed alarm in place, Nurse notified    Restrictions

## 2025-06-21 NOTE — PROGRESS NOTES
Progress Note    Date:2025       Room:0518/518-01  Patient Name:Paulette Sutton     YOB: 1940     Age:84 y.o.      Subjective    Subjective: 84 year old female who presented to [facility] ED for evaluation of involuntary movement of right side of her body. Pt has history of diabetes, hypothyroidism, thalassemia and hyperlipidemia.      Pt has had abnormal, involuntary movements primarily of the right side of her body experienced over past 5 days. Recently started taking methyl blue for her memory. She has not been able to sleep the past two days due to symptoms. She does not take an SSRI or antipsychotic medication. She has no history of movement disorder.      In ED, UDS negative, CT head-no acute intracranial abnormality, MRI brain obtained and negative. Seen by neurology, started on periactin however patient did not tolerate and medication was stopped. EEG completed, read pending, psych consulted by neuro.        Review of Systems: 10 point system reviewed and negative except as stated above.    Objective         Vitals Last 24 Hours:  TEMPERATURE:  Temp  Av.4 °F (36.3 °C)  Min: 97 °F (36.1 °C)  Max: 98.1 °F (36.7 °C)  RESPIRATIONS RANGE: Resp  Av.8  Min: 16  Max: 18  PULSE OXIMETRY RANGE: SpO2  Av.2 %  Min: 94 %  Max: 97 %  PULSE RANGE: Pulse  Av.2  Min: 62  Max: 91  BLOOD PRESSURE RANGE: Systolic (24hrs), Av , Min:88 , Max:124   ; Diastolic (24hrs), Av, Min:68, Max:96    I/O (24Hr):  No intake or output data in the 24 hours ending 25 1121      Physical Examination:  General: Well-developed, no acute distress lying comfortably in bed.  HEENT: Atraumatic normocephalic, range of motion normal, no tracheal deviation noted.  Cardiac: Normal S1-S2 no murmurs   Respiratory: clear To auscultation bilaterally, no rhonchi or rales, no wheezing  Abdomen: Soft, positive bowel sounds in all quadrants, no distention, nontender to palpation, no rebound noted.    Extremities: no

## 2025-06-21 NOTE — PROGRESS NOTES
When administering patient medication, patient had informed me she had taken her medication from home. Pt stated \" I took my medication at 9 o'clock because you didn't bring it to me.\" Pt instructed not to take home medication while in the hospital. Medication was placed in the patient medication cabinet in med room.

## 2025-06-22 VITALS
TEMPERATURE: 97.5 F | BODY MASS INDEX: 21.71 KG/M2 | WEIGHT: 118 LBS | DIASTOLIC BLOOD PRESSURE: 72 MMHG | HEART RATE: 73 BPM | OXYGEN SATURATION: 96 % | RESPIRATION RATE: 16 BRPM | HEIGHT: 62 IN | SYSTOLIC BLOOD PRESSURE: 109 MMHG

## 2025-06-22 PROBLEM — R41.9 NEUROCOGNITIVE DISORDER: Status: ACTIVE | Noted: 2023-03-23

## 2025-06-22 PROBLEM — F44.4 FUNCTIONAL NEUROLOGICAL SYMPTOM DISORDER WITH ABNORMAL MOVEMENT: Status: ACTIVE | Noted: 2025-06-22

## 2025-06-22 LAB — MAGNESIUM SERPL-MCNC: 1.9 MG/DL (ref 1.6–2.4)

## 2025-06-22 PROCEDURE — 36415 COLL VENOUS BLD VENIPUNCTURE: CPT

## 2025-06-22 PROCEDURE — 6370000000 HC RX 637 (ALT 250 FOR IP): Performed by: NURSE PRACTITIONER

## 2025-06-22 PROCEDURE — 6360000002 HC RX W HCPCS: Performed by: PSYCHIATRY & NEUROLOGY

## 2025-06-22 PROCEDURE — 99231 SBSQ HOSP IP/OBS SF/LOW 25: CPT | Performed by: PSYCHIATRY & NEUROLOGY

## 2025-06-22 PROCEDURE — 1200000000 HC SEMI PRIVATE

## 2025-06-22 PROCEDURE — 83735 ASSAY OF MAGNESIUM: CPT

## 2025-06-22 PROCEDURE — 94760 N-INVAS EAR/PLS OXIMETRY 1: CPT

## 2025-06-22 PROCEDURE — 2500000003 HC RX 250 WO HCPCS: Performed by: NURSE PRACTITIONER

## 2025-06-22 PROCEDURE — 99232 SBSQ HOSP IP/OBS MODERATE 35: CPT | Performed by: PSYCHIATRY & NEUROLOGY

## 2025-06-22 RX ADMIN — SODIUM CHLORIDE, PRESERVATIVE FREE 10 ML: 5 INJECTION INTRAVENOUS at 22:47

## 2025-06-22 RX ADMIN — Medication 0.5 MG: at 22:47

## 2025-06-22 RX ADMIN — PROPRANOLOL HYDROCHLORIDE 20 MG: 20 TABLET ORAL at 07:54

## 2025-06-22 NOTE — CARE COORDINATION
SW met with Pt / daughter Janelle in the room to discuss DC planning options. Pt/daughter requested referrals to [facility] and [facility]. Awaiting acceptance and bed availability.     [facility]   [phone] P   F    [facility]   P   F  Electronically signed by Brian Romero on 6/22/2025 at 10:48 AM

## 2025-06-22 NOTE — PROGRESS NOTES
Department of Psychiatry  Attending Progress Note     Chief complaint: Anxiety, psychogenic movements    SUBJECTIVE:   Chart reviewed, discussed with the team. No major issues overnight. Patient is med-compliant. No SEs. Performs ADLs.  Per nursing, right-sided body movements are notable only when someone is present in the room.  No safety concerns per collateral from patient's daughter.    Patient seen in her room resting in bed.  Right-sided body movements appear volitional.  Denies SI/HI/AVH.  Still tangential at times somewhat nonsensical.  Good sleep and appetite.      OBJECTIVE    Physical  Wt Readings from Last 3 Encounters:   06/18/25 53.5 kg (118 lb)   04/14/25 56.7 kg (125 lb)   11/19/24 56.7 kg (125 lb)     Temp Readings from Last 3 Encounters:   06/22/25 97.2 °F (36.2 °C) (Temporal)   08/29/24 97.8 °F (36.6 °C) (Temporal)   07/27/24 97.9 °F (36.6 °C) (Temporal)     BP Readings from Last 3 Encounters:   06/22/25 (!) 140/73   04/14/25 (!) 158/77   11/19/24 119/68     Pulse Readings from Last 3 Encounters:   06/22/25 (!) 112   04/14/25 71   11/19/24 66        Review of Systems: 14-point review of systems negative except as described above    Mental Status Examination:   Appearance: Appropriately groomed and in hospital attire. Made good eye contact.  Gait not assessed.  Right-sided body movements appear volitional.  Behavior: Calm, cooperative  Speech: Normal in tone, volume, and quality. No slurring, dysarthria or pressured speech noted.   Mood: uto   Affect: Superficially bright  Thought Process: Appears tangential  Thought Content: Denies active suicidal and homicidal ideation. No overt delusions or paranoia appreciated.   Perceptions: Denies auditory or visual hallucinations at present time. Not responding to internal stimuli.   Concentration: poor  Orientation: to person, place, date, and situation.   Language: Intact.   Fund of information: Intact.   Memory: Recent and remote appear grossly intact.

## 2025-06-22 NOTE — PROGRESS NOTES
[facility] Neurology Progress Note      Patient:   Paulette Sutton  MR#:    [id number]   Room:    [facility]/518-01   YOB: 1940  Date of Progress Note: 6/22/2025  Time of Note                           7:36 AM  Consulting Physician:  Enrique Chavez DO  Attending Physician:  Nubia Kumar MD      INTERVAL HISTORY:  No acute events, movements seem better but akathisia still noted, some worsening anxiety, agitation noted.  No complaints today    REVIEW OF SYSTEMS:  Constitutional: No fevers No chills  Neck:No stiffness  Respiratory: No shortness of breath  Cardiovascular: No chest pain No palpitations  Gastrointestinal: No abdominal pain    Genitourinary: No Dysuria  Neurological: No headache, mild confusion    PHYSICAL EXAM:    Constitutional -   BP (!) 140/73   Pulse (!) 112   Temp 97.2 °F (36.2 °C) (Temporal)   Resp 18   Ht 1.575 m (5' 2\")   Wt 53.5 kg (118 lb)   SpO2 96%   BMI 21.58 kg/m²   General appearance: No acute distress   EYES -   Conjunctiva normal  Pupillary exam as below, see CN exam in the neurologic exam  ENT-    No scars, masses, or lesions over external nose or ears  Oropharynx without erythema, palate midline  Cardiovascular -   No clubbing, cyanosis, or edema   Pulmonary-   Good expansion, normal effort without use of accessory muscles  Musculoskeletal -   No significant wasting of muscles noted  Gait as below, see gait exam in the neurologic exam  Muscle strength, tone, stability as below see the motor exam in the neurologic exam.   No bony deformities  Skin -   Warm, dry, and intact to inspection and palpation.    No rash, erythema, or pallor  Psychiatric -   Mood, affect, and behavior appear normal    Memory as below see mental status examination in the neurologic exam        NEUROLOGICAL EXAM     Mental status    [x] Awake, alert, oriented   [x] Affect attention and concentration appear appropriate  [x] Recent and remote memory appears unremarkable  [x] Speech normal without

## 2025-06-22 NOTE — PROGRESS NOTES
Progress Note    Date:2025       Room:0518/518-01  Patient Name:Paulette Sutton     YOB: 1940     Age:84 y.o.      Subjective    Subjective: 84 year old female who presented to [facility] ED for evaluation of involuntary movement of right side of her body. Pt has history of diabetes, hypothyroidism, thalassemia and hyperlipidemia.      Pt has had abnormal, involuntary movements primarily of the right side of her body experienced over past 5 days. Recently started taking methyl blue for her memory. She has not been able to sleep the past two days due to symptoms. She does not take an SSRI or antipsychotic medication. She has no history of movement disorder.      In ED, UDS negative, CT head-no acute intracranial abnormality, MRI brain obtained and negative. Seen by neurology, started on periactin however patient did not tolerate and medication was stopped. EEG unremarkable, psych following. Patient will need SNF placement.        Review of Systems: 10 point system reviewed and negative except as stated above.    Objective         Vitals Last 24 Hours:  TEMPERATURE:  Temp  Av.4 °F (36.3 °C)  Min: 97.2 °F (36.2 °C)  Max: 97.9 °F (36.6 °C)  RESPIRATIONS RANGE: Resp  Av.5  Min: 16  Max: 18  PULSE OXIMETRY RANGE: SpO2  Av.6 %  Min: 95 %  Max: 99 %  PULSE RANGE: Pulse  Av.5  Min: 71  Max: 112  BLOOD PRESSURE RANGE: Systolic (24hrs), Av , Min:98 , Max:140   ; Diastolic (24hrs), Av, Min:52, Max:73    I/O (24Hr):  No intake or output data in the 24 hours ending 25 1300      Physical Examination:  General: Well-developed, no acute distress lying comfortably in bed.  HEENT: Atraumatic normocephalic, range of motion normal, no tracheal deviation noted.  Cardiac: Normal S1-S2 no murmurs   Respiratory: clear To auscultation bilaterally, no rhonchi or rales, no wheezing  Abdomen: Soft, positive bowel sounds in all quadrants, no distention, nontender to palpation, no rebound noted.

## 2025-06-23 ENCOUNTER — TELEPHONE (OUTPATIENT)
Dept: INTERNAL MEDICINE | Facility: CLINIC | Age: 85
End: 2025-06-23

## 2025-06-23 LAB
BACTERIA BLD CULT ORG #2: NORMAL
BACTERIA BLD CULT: NORMAL

## 2025-06-23 PROCEDURE — 1200000000 HC SEMI PRIVATE

## 2025-06-23 PROCEDURE — 6370000000 HC RX 637 (ALT 250 FOR IP): Performed by: NURSE PRACTITIONER

## 2025-06-23 PROCEDURE — 97530 THERAPEUTIC ACTIVITIES: CPT

## 2025-06-23 PROCEDURE — 99232 SBSQ HOSP IP/OBS MODERATE 35: CPT | Performed by: PSYCHIATRY & NEUROLOGY

## 2025-06-23 PROCEDURE — 6360000002 HC RX W HCPCS: Performed by: PSYCHIATRY & NEUROLOGY

## 2025-06-23 PROCEDURE — 97116 GAIT TRAINING THERAPY: CPT

## 2025-06-23 PROCEDURE — 2500000003 HC RX 250 WO HCPCS: Performed by: NURSE PRACTITIONER

## 2025-06-23 PROCEDURE — 94760 N-INVAS EAR/PLS OXIMETRY 1: CPT

## 2025-06-23 PROCEDURE — 97165 OT EVAL LOW COMPLEX 30 MIN: CPT

## 2025-06-23 RX ADMIN — SODIUM CHLORIDE, PRESERVATIVE FREE 10 ML: 5 INJECTION INTRAVENOUS at 22:18

## 2025-06-23 RX ADMIN — Medication 0.5 MG: at 22:17

## 2025-06-23 RX ADMIN — PROPRANOLOL HYDROCHLORIDE 20 MG: 20 TABLET ORAL at 10:52

## 2025-06-23 NOTE — PROGRESS NOTES
[facility] Neurology Progress Note      Patient:   Paulette Sutton  MR#:    [id number]   Room:    18/518-01   YOB: 1940  Date of Progress Note: 6/23/2025  Time of Note                           7:18 AM  Consulting Physician:  Enrique Chavez DO  Attending Physician:  Nubia Kumar MD      INTERVAL HISTORY: Patient has looked up her symptoms on the Internet and feels that she knows what is wrong with her but cannot tell me what it is.    REVIEW OF SYSTEMS:  Constitutional: No fevers No chills  Neck:No stiffness  Respiratory: No shortness of breath  Cardiovascular: No chest pain No palpitations  Gastrointestinal: No abdominal pain    Genitourinary: No Dysuria  Neurological: No headache, mild confusion    PHYSICAL EXAM:    Constitutional -   /82   Pulse 81   Temp 98.1 °F (36.7 °C) (Temporal)   Resp 16   Ht 1.575 m (5' 2\")   Wt 53.5 kg (118 lb)   SpO2 98%   BMI 21.58 kg/m²   General appearance: No acute distress   EYES -   Conjunctiva normal  Pupillary exam as below, see CN exam in the neurologic exam  ENT-    No scars, masses, or lesions over external nose or ears  Oropharynx without erythema, palate midline  Cardiovascular -   No clubbing, cyanosis, or edema   Pulmonary-   Good expansion, normal effort without use of accessory muscles  Musculoskeletal -   No significant wasting of muscles noted  Gait as below, see gait exam in the neurologic exam  Muscle strength, tone, stability as below see the motor exam in the neurologic exam.   No bony deformities  Skin -   Warm, dry, and intact to inspection and palpation.    No rash, erythema, or pallor  Psychiatric -   Mood, affect, and behavior appear normal    Memory as below see mental status examination in the neurologic exam        NEUROLOGICAL EXAM     Mental status    [x] Awake, alert, oriented   [x] Affect attention and concentration appear appropriate  [x] Recent and remote memory appears unremarkable  [x] Speech normal without

## 2025-06-23 NOTE — PROGRESS NOTES
Physical Therapy    Name: Paulette Sutton  MRN: [id number]  Date of service: 6/23/2025    Pt in bed on laptop and phone. Stated she was busy and needing to be seen later.       Electronically signed by Samy Cox PTA on 6/23/2025 at 9:26 AM

## 2025-06-23 NOTE — PROGRESS NOTES
Progress Note    Date:2025       Room:0518/518-01  Patient Name:Paulette Sutton     YOB: 1940     Age:84 y.o.      Subjective    Subjective: 84 year old female who presented to [facility] ED for evaluation of involuntary movement of right side of her body. Pt has history of diabetes, hypothyroidism, thalassemia and hyperlipidemia.      Pt has had abnormal, involuntary movements primarily of the right side of her body experienced over past 5 days. Recently started taking methyl blue for her memory. She has not been able to sleep the past two days due to symptoms. She does not take an SSRI or antipsychotic medication. She has no history of movement disorder. In ED, UDS negative, CT head-no acute intracranial abnormality, MRI brain obtained and negative. Seen by neurology, started on periactin however patient did not tolerate and medication was stopped. EEG unremarkable, psych evaluated and signed off. Patient will need SNF placement.        Review of Systems: 10 point system reviewed and negative except as stated above.    Objective         Vitals Last 24 Hours:  TEMPERATURE:  Temp  Av.8 °F (36.6 °C)  Min: 97.3 °F (36.3 °C)  Max: 98.1 °F (36.7 °C)  RESPIRATIONS RANGE: Resp  Av.4  Min: 16  Max: 18  PULSE OXIMETRY RANGE: SpO2  Av.3 %  Min: 94 %  Max: 98 %  PULSE RANGE: Pulse  Av.2  Min: 73  Max: 84  BLOOD PRESSURE RANGE: Systolic (24hrs), Av , Min:91 , Max:155   ; Diastolic (24hrs), Av, Min:45, Max:82    I/O (24Hr):  No intake or output data in the 24 hours ending 25 1159      Physical Examination:  General: Well-developed, no acute distress lying comfortably in bed.  HEENT: Atraumatic normocephalic, range of motion normal, no tracheal deviation noted.  Cardiac: Normal S1-S2 no murmurs   Respiratory: clear To auscultation bilaterally, no rhonchi or rales, no wheezing  Abdomen: Soft, positive bowel sounds in all quadrants, no distention, nontender to palpation, no rebound

## 2025-06-23 NOTE — PROGRESS NOTES
Occupational Therapy  Facility/Department: [facility] SURG SERVICES  Occupational Therapy Initial Assessment    Name: Paulette Sutton  : 1940  MRN: [id number]  Date of Service: 2025    Discharge Recommendations:             Patient Diagnosis(es): The primary encounter diagnosis was Abnormal involuntary movement. Diagnoses of Altered mental status, unspecified altered mental status type and Chest pain, unspecified type were also pertinent to this visit.  Past Medical History:  has a past medical history of Heart murmur, HLD (hyperlipidemia), Scoliosis, Thalassemia, and Tobacco abuse, in remission.  Past Surgical History:  has a past surgical history that includes Ovary removal (Bilateral, ) and liver biopsy (N/A, ).    Treatment Diagnosis: Abnormal involuntary movement      Assessment  Performance deficits / Impairments: Decreased functional mobility ;Decreased ADL status;Decreased balance  Assessment: Will progress as tolerated  Treatment Diagnosis: Abnormal involuntary movement  Prognosis: Good  Decision Making: Low Complexity  REQUIRES OT FOLLOW-UP: Yes  Activity Tolerance  Activity Tolerance: Patient Tolerated treatment well     Plan  Occupational Therapy Plan  Times Per Week: 3-5x/week  Times Per Day: Once a day    Restrictions       Subjective  General  Chart Reviewed: Yes  Patient assessed for rehabilitation services?: Yes  Family / Caregiver Present: No  Diagnosis: Abnormal involuntary movement, AMS  General Comment  Comments: Pt. willing to participate with OT     Social/Functional History  Social/Functional History  Lives With: Alone  Prior Level of Assist for ADLs: Independent  Prior Level of Assist for Ambulation: Independent household ambulator, with or without device  Prior Level of Assist for Transfers: Independent    Objective  Temp: 97.3 °F (36.3 °C)  Pulse: 77  Heart Rate Source: Monitor  Respirations: 16  SpO2: 95 %  O2 Device: None (Room air)  BP: (!) 155/73  MAP (Calculated): 100  BP

## 2025-06-23 NOTE — TELEPHONE ENCOUNTER
"  Caller: Ricarda Hatch \"AGUEDA\"    Relationship: Self    Best call back number: [phone]     What is the best time to reach you: ANYTIME    Who are you requesting to speak with (clinical staff, provider,  specific staff member): CLINICAL OR PROVIDER    Do you know the name of the person who called: PATIENT    What was the call regarding: LOSING FUNCTION (MOVING DISORDER) & HAVING A HARD TIME REMEMBERING    PLEASE CALL PATIENT BACK AS SHE IS FRUSTRATED & FRIGHTENED ABOUT LOSING MOVEMENT & REMEMBERING.    PATIENT FEELS LIKE SHE WANTS A SECOND OPINION.    MAR HASN'T GIVEN A DIAGNOSIS.    WANTS TO POSSIBLY HAVE A REFERRAL   "

## 2025-06-23 NOTE — PROGRESS NOTES
Occupational Therapy    Attempted to eval. This AM.  Pt. Unavailable as she was working on her computer and appeared to have her cell phone on a technical support line.  Observed pt. Moving RUE and R hand fluidly without difficulty.  Will attempt eval. At later time.    Electronically signed by Lam Martinez OT on 6/23/2025 at 9:26 AM

## 2025-06-23 NOTE — PROGRESS NOTES
Physical Therapy    Name: Paulette Sutton  MRN: [id number]  Date of service: 6/23/2025 06/23/25 1045   General   Diagnosis AKATHISIA, MOTOR RESTLESSNESS   General   General Comments Pt in bed   Subjective   Subjective agreed to therapy   Observation/Palpation   Observation self sufficent in room, using phone and laptop   Bed mobility   Supine to Sit Supervision   Sit to Supine Supervision   Transfers   Sit to Stand Contact guard assistance   Stand to Sit Stand by assistance   Ambulation   Surface Level tile   Device Rolling Walker   Assistance Contact guard assistance   Quality of Gait no LOB, more steady   Distance 30ft   Comments more coordinated today   Short Term Goals   Time Frame for Short Term Goals 2 WKS   Short Term Goal 1  FT WITH RW, SBA   Activity Tolerance   Activity Tolerance Patient tolerated treatment well   Assessment   Assessment Pt doing well. Able to perform bed mobility, transfer, and ambulated into hallway. Returned to bed and all needs in reach. Put alarm on, pt appears she would try to get OOB without assist.   Discharge Recommendations Continue to assess pending progress;Patient would benefit from continued therapy after discharge   Physical Therapy Plan   General Plan 5-7 times per week   Therapy Duration 2 Weeks   Current Treatment Recommendations Strengthening;Balance training;Functional mobility training;Transfer training;Gait training;Safety education & training;Positioning;Pain management;Patient/Caregiver education & training;Cognitive reorientation;Therapeutic activities   PT Plan of Care   Monday X   Safety Devices   Type of Devices Left in bed;Bed alarm in place;Call light within reach   PT Whiteboard Notes   Therapy Whiteboard MOTOR RESTLESSNESS (improved) RE 7/5   Recommendation   Requires PT Follow-Up Yes           Electronically signed by Samy Cox PTA on 6/23/2025 at 11:38 AM

## 2025-06-24 ENCOUNTER — READMISSION MANAGEMENT (OUTPATIENT)
Dept: CALL CENTER | Facility: HOSPITAL | Age: 85
End: 2025-06-24
Payer: MEDICARE

## 2025-06-24 VITALS
SYSTOLIC BLOOD PRESSURE: 110 MMHG | HEART RATE: 79 BPM | RESPIRATION RATE: 16 BRPM | WEIGHT: 118 LBS | TEMPERATURE: 98.2 F | BODY MASS INDEX: 21.71 KG/M2 | HEIGHT: 62 IN | OXYGEN SATURATION: 91 % | DIASTOLIC BLOOD PRESSURE: 70 MMHG

## 2025-06-24 LAB
EKG P AXIS: 28 DEGREES
EKG P AXIS: NORMAL DEGREES
EKG P-R INTERVAL: 164 MS
EKG P-R INTERVAL: NORMAL MS
EKG Q-T INTERVAL: 366 MS
EKG Q-T INTERVAL: 402 MS
EKG QRS DURATION: 104 MS
EKG QRS DURATION: 90 MS
EKG QTC CALCULATION (BAZETT): 417 MS
EKG QTC CALCULATION (BAZETT): 427 MS
EKG T AXIS: -106 DEGREES
EKG T AXIS: 59 DEGREES

## 2025-06-24 PROCEDURE — 2500000003 HC RX 250 WO HCPCS: Performed by: NURSE PRACTITIONER

## 2025-06-24 PROCEDURE — 97110 THERAPEUTIC EXERCISES: CPT

## 2025-06-24 PROCEDURE — 99232 SBSQ HOSP IP/OBS MODERATE 35: CPT | Performed by: PSYCHIATRY & NEUROLOGY

## 2025-06-24 PROCEDURE — 97116 GAIT TRAINING THERAPY: CPT

## 2025-06-24 PROCEDURE — 6370000000 HC RX 637 (ALT 250 FOR IP): Performed by: NURSE PRACTITIONER

## 2025-06-24 PROCEDURE — 94760 N-INVAS EAR/PLS OXIMETRY 1: CPT

## 2025-06-24 RX ADMIN — SODIUM CHLORIDE, PRESERVATIVE FREE 10 ML: 5 INJECTION INTRAVENOUS at 08:05

## 2025-06-24 RX ADMIN — PROPRANOLOL HYDROCHLORIDE 20 MG: 20 TABLET ORAL at 08:05

## 2025-06-24 NOTE — PROGRESS NOTES
[facility] Neurology Progress Note      Patient:   Paulette Sutton  MR#:    [id number]   Room:    [facility]/518-01   YOB: 1940  Date of Progress Note: 6/24/2025  Time of Note                           12:49 PM  Consulting Physician:  Enrique Chavez DO  Attending Physician:  Nubia Kumar MD      INTERVAL HISTORY:  No acute events, movements better, less agitated.    REVIEW OF SYSTEMS:  Constitutional: No fevers No chills  Neck:No stiffness  Respiratory: No shortness of breath  Cardiovascular: No chest pain No palpitations  Gastrointestinal: No abdominal pain    Genitourinary: No Dysuria  Neurological: No headache, no confusion    PHYSICAL EXAM:    Constitutional -   /70   Pulse 79   Temp 98.2 °F (36.8 °C) (Temporal)   Resp 16   Ht 1.575 m (5' 2\")   Wt 53.5 kg (118 lb)   SpO2 91%   BMI 21.58 kg/m²   General appearance: No acute distress   EYES -   Conjunctiva normal  Pupillary exam as below, see CN exam in the neurologic exam  ENT-    No scars, masses, or lesions over external nose or ears  Oropharynx without erythema, palate midline  Cardiovascular -   No clubbing, cyanosis, or edema   Pulmonary-   Good expansion, normal effort without use of accessory muscles  Musculoskeletal -   No significant wasting of muscles noted  Gait as below, see gait exam in the neurologic exam  Muscle strength, tone, stability as below see the motor exam in the neurologic exam.   No bony deformities  Skin -   Warm, dry, and intact to inspection and palpation.    No rash, erythema, or pallor  Psychiatric -   Mood, affect, and behavior appear normal    Memory as below see mental status examination in the neurologic exam        NEUROLOGICAL EXAM     Mental status    [x] Awake, alert, oriented   [x] Affect attention and concentration appear appropriate  [x] Recent and remote memory appears unremarkable  [x] Speech normal without dysarthria or aphasia, comprehension and repetition intact.   COMMENTS:   Cranial  question underlying psych component with symptoms.  Doing better.  Exam stable.      PLAN:  1.  Methylene blue has been stopped, holding all supplements, not currently on any serotonergic or dopamine blocking medications. Holding namenda, aricept, and melatonin as well.   2.  Did not tolerate periactin, stopped    3.  Continue low dose ativan prn   4.  Follow up EEG, remaining labs    5.  Psych has evaluated   6.  PT   7.  Plans for discharge home     Please feel free to call with any questions.   [phone] (cell phone).      Enrique Chavez DO  Board Certified Neurology

## 2025-06-24 NOTE — TELEPHONE ENCOUNTER
Lourdes with Paulette called asking about appt for pt. I informed her that [facility] is aware of what is going on with pt and pt is on wait list to be seen sooner. Pt will be at [facility] for therapy for the for seeable future, please contact pt there at # [phone]. Thank you.

## 2025-06-24 NOTE — CARE COORDINATION
2nd IMM Letter given to patient. Reviewed, discussed, answered questions, and signed by patient. Declined to stay 4 hours prior to discharge. Signed copy placed in patient's chart.     06/24/25 1330   IMM Letter   IMM Letter given to Patient/Family/Significant other/Guardian/POA/by: given to patient by TORIBIO ARZOLA RN BSN    IMM Letter date given: 06/24/25   IMM Letter time given: 1325     Electronically signed by Toribio Arzola RN, BSN on 6/24/2025 at 1:31 PM

## 2025-06-24 NOTE — DISCHARGE SUMMARY
Discharge Summary    Date:6/24/2025        Patient Name:Paulette Sutton     YOB: 1940     Age:84 y.o.    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 *      Hospital Stay   Narrative of Hospital Course:     84 year old female who presented to [facility] ED for evaluation of involuntary movement of right side of her body. Pt has history of diabetes, hypothyroidism, thalassemia and hyperlipidemia.      Pt has had abnormal, involuntary movements primarily of the right side of her body experienced over past 5 days prior to admission. Recently started taking methyl blue for her memory. She has not been able to sleep the past two days due to symptoms. She does not take an SSRI or antipsychotic medication. She has no history of movement disorder. In ED, UDS negative, CT head-no acute intracranial abnormality, MRI brain obtained and negative. Seen by neurology, started on periactin however patient did not tolerate and medication was stopped. Neuro recc holding aricept, namenda and melatonin. EEG unremarkable, psych evaluated, likey neurocognitive disorder and signed off. Patient will need SNF placement. Patient A1c of 5.6 and blood sugar in house on the 90's. Metformin discontinued on discharge. Seen by therapy and will need skilled therapy on discharge. Patient accepted to [facility].      Physical Examination:  General: Well-developed, no acute distress lying comfortably in bed.  HEENT: Atraumatic normocephalic, range of motion normal, no tracheal deviation noted.  Cardiac: Normal S1-S2 no murmurs   Respiratory: clear To auscultation bilaterally, no rhonchi or rales, no

## 2025-06-24 NOTE — PROGRESS NOTES
This nurse attempted to all [facility] multiple time over the span of 2 hours and didn't get a response.

## 2025-06-24 NOTE — TELEPHONE ENCOUNTER
I have called and left a VM at [facility] to let them know when patient has been scheduled an appointment with SALTY Joseph. Left on VM the appointment time/date/location/phone number.

## 2025-06-24 NOTE — DISCHARGE INSTR - DIET

## 2025-06-24 NOTE — PROGRESS NOTES
Physical Therapy  Name: Paulette Sutton  MRN:  [id number]  Date of service:  6/24/2025 06/24/25 1148   Subjective   Subjective Pt agreed to therapy.   Pain Assessment   Pain Assessment None - Denies Pain   Bed Mobility   Supine to Sit Stand by assistance   Transfers   Sit to Stand Contact guard assistance   Stand to Sit Contact guard assistance   Ambulation   Surface Level tile   Device Rolling Walker   Assistance Contact guard assistance   Quality of Gait no LOB, decreased coordination   Distance 30'   Exercises   Hip Flexion x10   Knee Long Arc Quad x10   Ankle Pumps x10   Other Activities   Comment pt amb into bathroom to wash face but asked to sit down and use washcloth instead stating she didn't feel she could perform the standing task   Short Term Goals   Time Frame for Short Term Goals 2 WKS   Short Term Goal 1  FT WITH RW, SBA   Conditions Requiring Skilled Therapeutic Intervention   Body Structures, Functions, Activity Limitations Requiring Skilled Therapeutic Intervention Decreased functional mobility ;Decreased ROM;Decreased strength;Decreased endurance;Increased pain   Assessment Pt not able to increase amb distance this date. Decreased coordination and requires cueing for RW technique intermittently. Assisted to chair with all needs in reach.   Activity Tolerance   Activity Tolerance Patient tolerated treatment well   PT Plan of Care   Tuesday X   Safety Devices   Type of Devices Call light within reach;Chair alarm in place;Left in chair;Nurse notified           Electronically signed by Shaista New PTA on 6/24/2025 at 12:04 PM

## 2025-06-24 NOTE — CARE COORDINATION
[facility] is able to accept Pt on Tuesday June 24th.     [facility]  Electronically signed by Brian Romero on 6/24/2025 at 8:56 AM

## 2025-06-24 NOTE — OUTREACH NOTE
Prep Survey      Flowsheet Row Responses   [facility] facility patient discharged from? Non-BH   Is LACE score < 7 ? Non- Discharge   Eligibility [facility]   Date of Admission 06/18/25   Date of Discharge 06/24/25   Discharge Disposition Home or Self Care   Discharge diagnosis Abnormal involuntary movement, Motor restlessness, Altered mental status   Does the patient have one of the following disease processes/diagnoses(primary or secondary)? Other   Prep survey completed? Yes            LUCY MAN - Registered Nurse

## 2025-06-24 NOTE — TELEPHONE ENCOUNTER
Called and spoke with daughter went over information- as patient is wanting referral to [facility] movement disorder center ( [facility])  Phone [phone]  Advised they should talk to the providers as she is an inpatient currently at [facility]

## 2025-06-25 ENCOUNTER — TRANSITIONAL CARE MANAGEMENT TELEPHONE ENCOUNTER (OUTPATIENT)
Dept: CALL CENTER | Facility: HOSPITAL | Age: 85
End: 2025-06-25
Payer: MEDICARE

## 2025-06-25 NOTE — OUTREACH NOTE
Call Center TCM Note      Flowsheet Row Responses   [facility] patient discharged [facility]  [facility]   Does the patient have one of the following disease processes/diagnoses(primary or secondary)? Other   TCM attempt successful? Yes   Call start time 1327   Call end time 1328   Discharge diagnosis Abnormal involuntary movement, Motor restlessness, Altered mental status   Person spoke with today (if not patient) and relationship patient   Comments 7/2/2025  3:30 PM CDT Arrive by 3:15 PM HOSPITAL FOLLOW UP 30 min [facility] Sergio Valentin MD   Does the patient have an appointment with their PCP within 7-14 days of discharge? No   Nursing Interventions Assisted patient with making appointment per protocol   Has home health visited the patient within 72 hours of discharge? N/A   Psychosocial issues? No   What is the patient's perception of their health status since discharge? Same   Is the patient/caregiver able to teach back signs and symptoms related to disease process for when to call PCP? Yes   Is the patient/caregiver able to teach back signs and symptoms related to disease process for when to call 911? Yes   Is the patient/caregiver able to teach back the hierarchy of who to call/visit for symptoms/problems? PCP, Specialist, Home health nurse, Urgent Care, ED, 911 Yes   TCM call completed? Yes   Wrap up additional comments patient requesting referral to Neurology, and [facility] a movement disorder clinic- please advise.   Call end time 1328   Would this patient benefit from a Referral to Amb Social Work? No   Is the patient interested in additional calls from an ambulatory ? No            Maday LUCERO - Registered Nurse    6/25/2025, 13:33 EDT

## 2025-07-09 ENCOUNTER — TELEPHONE (OUTPATIENT)
Dept: INTERNAL MEDICINE | Facility: CLINIC | Age: 85
End: 2025-07-09
Payer: MEDICARE

## 2025-07-09 ENCOUNTER — OFFICE VISIT (OUTPATIENT)
Dept: NEUROLOGY | Age: 85
End: 2025-07-09
Payer: MEDICARE

## 2025-07-09 VITALS
WEIGHT: 118 LBS | DIASTOLIC BLOOD PRESSURE: 67 MMHG | BODY MASS INDEX: 21.71 KG/M2 | OXYGEN SATURATION: 100 % | SYSTOLIC BLOOD PRESSURE: 125 MMHG | HEIGHT: 62 IN | HEART RATE: 81 BPM

## 2025-07-09 DIAGNOSIS — R25.9 ABNORMAL INVOLUNTARY MOVEMENT: Primary | ICD-10-CM

## 2025-07-09 DIAGNOSIS — G31.84 MILD COGNITIVE IMPAIRMENT: ICD-10-CM

## 2025-07-09 PROCEDURE — 99214 OFFICE O/P EST MOD 30 MIN: CPT | Performed by: NURSE PRACTITIONER

## 2025-07-09 PROCEDURE — 1160F RVW MEDS BY RX/DR IN RCRD: CPT | Performed by: NURSE PRACTITIONER

## 2025-07-09 PROCEDURE — 1090F PRES/ABSN URINE INCON ASSESS: CPT | Performed by: NURSE PRACTITIONER

## 2025-07-09 PROCEDURE — G8399 PT W/DXA RESULTS DOCUMENT: HCPCS | Performed by: NURSE PRACTITIONER

## 2025-07-09 PROCEDURE — 1123F ACP DISCUSS/DSCN MKR DOCD: CPT | Performed by: NURSE PRACTITIONER

## 2025-07-09 PROCEDURE — G8420 CALC BMI NORM PARAMETERS: HCPCS | Performed by: NURSE PRACTITIONER

## 2025-07-09 PROCEDURE — 1036F TOBACCO NON-USER: CPT | Performed by: NURSE PRACTITIONER

## 2025-07-09 PROCEDURE — G8427 DOCREV CUR MEDS BY ELIG CLIN: HCPCS | Performed by: NURSE PRACTITIONER

## 2025-07-09 PROCEDURE — 1159F MED LIST DOCD IN RCRD: CPT | Performed by: NURSE PRACTITIONER

## 2025-07-09 PROCEDURE — 1111F DSCHRG MED/CURRENT MED MERGE: CPT | Performed by: NURSE PRACTITIONER

## 2025-07-09 NOTE — TELEPHONE ENCOUNTER
"Caller: Ricarda Hatch \"AGUEDA\"    Relationship: Self    Best call back number:   Telephone Information:   Mobile [phone]       What is the medical concern/diagnosis: OCCUPATIONAL, PHYSICAL AND SPEECH THERAPY     What specialty or service is being requested: HOME HEALTHCARE    What is the provider, practice or medical service name: NEEDS RECOMMENDATION     Any additional details: PATIENT IS HAVING A DIFFICULT TIME FUNCTIONING IN HOME THERAPY, PATIENT HAS AN FOLLOW UP ON 07/22/2025, PLEASE FOLLOW UP WITH PATIENT       "

## 2025-07-09 NOTE — PROGRESS NOTES
[facility] Office Note      Patient:   Paulette Sutton  MR#:    [id number]  Account Number:                         YOB: 1940  Date of Evaluation:  7/9/2025  Time of Note:                          3:11 PM  Primary/Referring Physician:  Jolene Rodrigues MD   Consulting Physician:  Ara Min DNP, APRN    FOLLOW UP    Chief Complaint   Patient presents with    Follow-up    Memory Loss    Referral - General     Request referrals to [facility] for uncontrolled movements     HISTORY OF PRESENT ILLNESS    Paulette Sutton is a 84 y.o. year old female here for follow up of memory loss, new complaints of uncontrolled movements. She reports taking methylene blue supplement along with other supplements and having a reaction to it. She describes the reaction as right sided movements involving the arm and leg, some truncal movements. Symptoms lasted for several days. She reports still noting intermittent movements of the arm and leg now although appear somewhat distractible. Per hospital notes, she had hyperkinetic movements involving the right side of her body that improved and seemed to be present only when someone was in the room.  She is not on dopamine or serotonergic blockers medications.  Memory seems somewhat worse. Has noted more irritability and agitation since admission. She has a long history of memory changes, beginning in the 60s. Has been progressive since that time. She reports living on her own, family lives nearby. Primarily short term memory loss. Some name/word recall difficulty. Some repetition of stories and questions. Memory loss is not interfering with ADLs. She has some difficulty with remembering to take her medications, does better with routine. She is using a pill organizer. She is driving, no tickets, accidents. Reports that she does get turned around when driving in unfamiliar places. She is handling her own finances. She is cooking, able to follow a recipe. Her daughter had

## 2025-07-10 ENCOUNTER — TELEPHONE (OUTPATIENT)
Dept: INTERNAL MEDICINE | Facility: CLINIC | Age: 85
End: 2025-07-10
Payer: MEDICARE

## 2025-07-10 NOTE — TELEPHONE ENCOUNTER
We have since been contacted by [facility], asking if Dr. Valentin will follow, so will resolve this message since home health has been established.   Mirvaso Counseling: Mirvaso is a topical medication which can decrease superficial blood flow where applied. Side effects are uncommon and include stinging, redness and allergic reactions.

## 2025-07-10 NOTE — TELEPHONE ENCOUNTER
[facility]    [phone]    Calling to confirm if Dr. Valentin will be following up with pt due to being in the nursing home.

## 2025-07-15 ENCOUNTER — OFFICE VISIT (OUTPATIENT)
Dept: INTERNAL MEDICINE | Facility: CLINIC | Age: 85
End: 2025-07-15
Payer: MEDICARE

## 2025-07-15 VITALS
TEMPERATURE: 98.1 F | HEART RATE: 74 BPM | SYSTOLIC BLOOD PRESSURE: 130 MMHG | WEIGHT: 118 LBS | DIASTOLIC BLOOD PRESSURE: 74 MMHG | BODY MASS INDEX: 21.71 KG/M2 | HEIGHT: 62 IN | OXYGEN SATURATION: 97 %

## 2025-07-15 DIAGNOSIS — E03.9 HYPOTHYROIDISM, UNSPECIFIED TYPE: Primary | ICD-10-CM

## 2025-07-15 DIAGNOSIS — Z79.899 POLYPHARMACY: ICD-10-CM

## 2025-07-15 DIAGNOSIS — F41.9 ANXIETY: ICD-10-CM

## 2025-07-15 DIAGNOSIS — E11.9 TYPE 2 DIABETES MELLITUS WITHOUT COMPLICATION, WITHOUT LONG-TERM CURRENT USE OF INSULIN: ICD-10-CM

## 2025-07-15 DIAGNOSIS — R25.9 INVOLUNTARY MOVEMENTS: ICD-10-CM

## 2025-07-15 DIAGNOSIS — R41.3 MEMORY LOSS: ICD-10-CM

## 2025-07-15 PROCEDURE — G2211 COMPLEX E/M VISIT ADD ON: HCPCS | Performed by: INTERNAL MEDICINE

## 2025-07-15 PROCEDURE — 1126F AMNT PAIN NOTED NONE PRSNT: CPT | Performed by: INTERNAL MEDICINE

## 2025-07-15 PROCEDURE — 99214 OFFICE O/P EST MOD 30 MIN: CPT | Performed by: INTERNAL MEDICINE

## 2025-07-15 RX ORDER — LORAZEPAM 0.5 MG/1
0.5 TABLET ORAL EVERY 8 HOURS PRN
COMMUNITY
End: 2025-07-15

## 2025-07-15 RX ORDER — LEVOCETIRIZINE DIHYDROCHLORIDE 5 MG/1
5 TABLET, FILM COATED ORAL EVERY EVENING
COMMUNITY

## 2025-07-15 RX ORDER — CHOLECALCIFEROL (VITAMIN D3) 25 MCG
1000 TABLET ORAL DAILY
COMMUNITY

## 2025-07-15 RX ORDER — BUSPIRONE HYDROCHLORIDE 5 MG/1
5 TABLET ORAL 3 TIMES DAILY PRN
Qty: 90 TABLET | Refills: 2 | Status: SHIPPED | OUTPATIENT
Start: 2025-07-15

## 2025-07-15 RX ORDER — SENNOSIDES 8.6 MG
650 CAPSULE ORAL EVERY 8 HOURS PRN
COMMUNITY

## 2025-07-15 NOTE — PROGRESS NOTES
"      Chief Complaint  Follow-up (D/c from nursing rehab  on 7/9/25/Bertha is wanting face to face and order for home health (PT, OT, and speech)/[facility] - [phone]/fax number is [phone] /) and Anxiety    Subjective        Ricarda Hatch presents to [facility]    HPI    Patient here for the above problems.  See Assessment and Plan for further HPI components.      Review of Systems    Objective   Vital Signs:  /74 (BP Location: Left arm, Patient Position: Sitting, Cuff Size: Adult)   Pulse 74   Temp 98.1 °F (36.7 °C) (Temporal)   Ht 157.5 cm (62\")   Wt 53.5 kg (118 lb)   SpO2 97%   BMI 21.58 kg/m²   Estimated body mass index is 21.58 kg/m² as calculated from the following:    Height as of this encounter: 157.5 cm (62\").    Weight as of this encounter: 53.5 kg (118 lb).      Physical Exam  Vitals and nursing note reviewed.   Constitutional:       Appearance: She is not ill-appearing.   Eyes:      General: No scleral icterus.     Conjunctiva/sclera: Conjunctivae normal.   Cardiovascular:      Rate and Rhythm: Normal rate and regular rhythm.   Pulmonary:      Effort: Pulmonary effort is normal. No respiratory distress.   Skin:     General: Skin is warm.      Coloration: Skin is not pale.   Neurological:      General: No focal deficit present.      Mental Status: She is alert and oriented to person, place, and time.   Psychiatric:         Mood and Affect: Mood normal.         Behavior: Behavior normal.                       Assessment and Plan   Diagnoses and all orders for this visit:    1. Hypothyroidism, unspecified type (Primary)    2. Type 2 diabetes mellitus without complication, without long-term current use of insulin    3. Involuntary movements    4. Memory loss    5. Anxiety    6. Polypharmacy    Other orders  -     busPIRone (BUSPAR) 5 MG tablet; Take 1 tablet by mouth 3 (Three) Times a Day As Needed (anxiety).  Dispense: 90 tablet; Refill: 2    Patient " "has suspected methylene blue toxicity.  Patient seems today more mentally clear that I have ever seen her to be completely honest.  Patient om some other visits has seemed to have some underlying dementia and confused during visits.  I have only seen her a handful of times.  Today she is very clear and coherent.      The outside hospital felt as though the patient her some neurocognitive issues as well as methylene blue toxicity.  The neurologist also mentioned movement disorder and the patient has requested a referral to a movement disorder clinic which she will see in [place].  I have not seen any movements during the visit today.  The patient reports they started her on ativan to help with the movements and sleep.  She has been on for 1 month.  Do not recommend ativan long-term in an 84 year old female.  I have also not found good evidence using traditional searches as well as AI assisted searches and cannot find a good source for methylene blue toxicity causing uncontrolled movements as the patient describes them as \"chorea\".  The patient was taking several supplements and serotonin agents, which I guess could cause some abnormal movements but typically see high temperature and rigidity which was not noted.  I am not sure what caused her issues but it seems to have resolved.  Stop ativan.  Has not been on it long enough that we shouldn't just be able to stop it and its low dose and once a day (nighttime).  Can cut in half if feel like need it more.    She has a lot of anxiety and I do think some mild cognitive impairment (early dementia).  Recommend potentially seeing a psychiatrist and a counselor.  Patient interested in CBT.      History of Present Illness  The patient presents today for a follow-up visit.    She reports a gradual improvement in her strength, which she attributes to her persistent efforts. She has been using Ativan for sleep but finds it too potent for daytime use. She is seeking an " alternative medication that can be used as needed during the day. Her anxiety levels fluctuate, with periods of calmness interspersed with episodes of heightened anxiety. She has previously tried melatonin spray and Benadryl for sleep. Ativan was prescribed during a hospital stay to manage muscle movements and sleep. Patient initially tried on cyproheptadine, but it induced a panic attack, leading to the switch to Ativan. She has been on Ativan for 3 weeks, taking it once to twice (usually once) daily along with propranolol. She has managed to sleep without Ativan on five occasions but is concerned about potential sleep disturbances if she discontinues it. She is considering cognitive behavioral therapy as a potential treatment option. She has not previously tried buspirone. She has a history of taking tramadol, which she discontinued due to its cognitive slowing effects.    She was previously on Namenda for migraines but discontinued it after experiencing no migraines.    Social History:  Sleep: She reports being able to nap without Ativan on five occasions.      Assessment & Plan  1. Anxiety.  Sleep issues.  Her anxiety is currently being managed with Ativan, which she finds too heavy for daytime use. She has been advised to discontinue Ativan due to its potential cognitive side effects. A prescription for buspirone 10 mg, 90 tablets, has been provided, which can be taken up to three or four times daily as needed. The potential side effects of tiredness and dizziness were discussed. She has also been recommended to try Sleep3, an over-the-counter supplement containing valerian root and slow-release melatonin, for sleep issues. Cognitive behavioral therapy (CBT) has been suggested as a beneficial adjunct to her treatment plan. She has been [facility] to [facility] for [facility].    2. Movement disorder.  She has been experiencing movement disorders, which are being managed with propranolol. Propranolol will help with  tremors and may help with movement if exacerbated by sympathetic simulation, so it is fine. It is also going to keep her blood pressure and heart rate under control. It also has some anxiety effects on it as well. She has been advised to continue propranolol.  She has a scheduled appointment at the movement disorder clinic next month.    3. Medication management.  She has been advised to discontinue Namenda as it is not necessary for her current condition. She has also been advised to stay off all similar types of medications. Her A1c was down, and she has been taken off metformin. Propranolol will help with tremors and can help with movement, so it is fine. It is also going to keep her blood pressure and heart rate under control. It also has some anxiety effects on it as well.    Documentation difficult to follow at times during her hospitalization.  Patient evaluated by neurology and psychiatry in addition to the hospitalist.  They stopped serotenergic and dopamine blocking agents.  MRI negative for acute changes- chronic changes noted  EEG normal  Labs essentially stable - no acute changes  A1c was stable they discontinued her metformin which her and I had discussed previously.  Psychiatry felt neurocognitive issues and functional neurological disorder.  Reported the right-sided body movements appear volitional.  Neurology final note:      Discharge summary from outside hospital:      Not sure if they discontinued her thyroid medication.      Result Review :  The following data was reviewed by: Sergio Valentin MD on 07/15/2025:    MRI Brain Without Contrast (06/19/2025 09:57)  CT Head Without Contrast (06/18/2025 12:04)  XR Chest 1 View (06/18/2025 11:40)  CT Head Without Contrast (06/16/2025 16:27)         BMI is within normal parameters. No other follow-up for BMI required.      BMI is within normal parameters. No other follow-up for BMI required.            Follow Up   Return in about 3 months (around  10/15/2025), or if symptoms worsen or fail to improve, for Medicare Wellness - Labs prior to visit, follow up for above problems. Longitudinal care..  Patient was given instructions and counseling regarding her condition or for health maintenance advice. Please see specific information pulled into the AVS if appropriate.       OPAL Valentin MD, FACP, [facility]      Electronically signed by Sergio Valentin MD, 07/15/25, 4:38 PM CDT.    Patient or patient representative verbalized consent for the use of Ambient Listening during the visit with  Sergio Valentin MD for chart documentation. 7/15/2025  16:56 CDT

## 2025-07-16 ENCOUNTER — TELEPHONE (OUTPATIENT)
Dept: INTERNAL MEDICINE | Facility: CLINIC | Age: 85
End: 2025-07-16
Payer: MEDICARE

## 2025-07-16 DIAGNOSIS — G25.89 OTHER SPECIFIED EXTRAPYRAMIDAL AND MOVEMENT DISORDERS: ICD-10-CM

## 2025-07-16 DIAGNOSIS — R41.9 NEUROCOGNITIVE DISORDER: ICD-10-CM

## 2025-07-16 DIAGNOSIS — R41.3 MEMORY LOSS: Primary | ICD-10-CM

## 2025-07-16 NOTE — TELEPHONE ENCOUNTER
[facility] [phone]  FAX NUMBER [phone] OFFICE NUMBER  NEEDS ORDER FOR HOME HEALTH   PLEASE FAX ORDER AND RECENT OFFICE NOTES

## 2025-07-17 ENCOUNTER — TELEPHONE (OUTPATIENT)
Dept: INTERNAL MEDICINE | Facility: CLINIC | Age: 85
End: 2025-07-17
Payer: MEDICARE

## 2025-07-17 NOTE — TELEPHONE ENCOUNTER
Herson Galo [phone] Referral, skilled nurse and pt is okay, wanting to pick her up today for treatment

## 2025-07-17 NOTE — TELEPHONE ENCOUNTER
Called wanting to clarify orders - they do not have ST, told ok to omit order.  We have not ordered skilled nursing but if there is a need, once they admit her, we can certainly order.  She states they plan to see her on Monday.

## 2025-07-21 ENCOUNTER — TELEPHONE (OUTPATIENT)
Dept: INTERNAL MEDICINE | Facility: CLINIC | Age: 85
End: 2025-07-21
Payer: MEDICARE

## 2025-07-21 NOTE — TELEPHONE ENCOUNTER
Called and spoke with Clover - will use G25.89 for movement disorder.  She will take the verbal so services can get started and we will send a new order once signed.

## 2025-07-21 NOTE — TELEPHONE ENCOUNTER
"Clover with [facility] ([phone]) called stating the diagnosis of \"involuntary movements\" will not cover her for H/H services, PT, etc.  She states she can use hypothyroid, but would have to be documented that her thyroid d/o is causing or contributing to her \"involuntary movements\".  Otherwise, would have to give an actual diagnosis of a qualifying condition, not just a symptom as a diagnosis.  Do you see anything else in her chart that we can use?  (Fax [phone])  "

## 2025-07-22 ENCOUNTER — OFFICE VISIT (OUTPATIENT)
Dept: INTERNAL MEDICINE | Facility: CLINIC | Age: 85
End: 2025-07-22
Payer: MEDICARE

## 2025-07-22 VITALS
SYSTOLIC BLOOD PRESSURE: 122 MMHG | BODY MASS INDEX: 21.9 KG/M2 | WEIGHT: 119 LBS | HEIGHT: 62 IN | OXYGEN SATURATION: 96 % | HEART RATE: 76 BPM | DIASTOLIC BLOOD PRESSURE: 68 MMHG | TEMPERATURE: 97.8 F

## 2025-07-22 DIAGNOSIS — H69.92 DYSFUNCTION OF LEFT EUSTACHIAN TUBE: ICD-10-CM

## 2025-07-22 DIAGNOSIS — M81.0 AGE-RELATED OSTEOPOROSIS WITHOUT CURRENT PATHOLOGICAL FRACTURE: Chronic | ICD-10-CM

## 2025-07-22 DIAGNOSIS — Z79.890 POSTMENOPAUSAL HRT (HORMONE REPLACEMENT THERAPY): Primary | ICD-10-CM

## 2025-07-22 DIAGNOSIS — J30.9 ALLERGIC RHINITIS, UNSPECIFIED SEASONALITY, UNSPECIFIED TRIGGER: ICD-10-CM

## 2025-07-22 DIAGNOSIS — R13.12 OROPHARYNGEAL DYSPHAGIA: ICD-10-CM

## 2025-07-22 DIAGNOSIS — G25.9 MOVEMENT DISORDER: ICD-10-CM

## 2025-07-22 DIAGNOSIS — E55.9 VITAMIN D DEFICIENCY: ICD-10-CM

## 2025-07-22 PROCEDURE — 99214 OFFICE O/P EST MOD 30 MIN: CPT | Performed by: INTERNAL MEDICINE

## 2025-07-22 PROCEDURE — 1126F AMNT PAIN NOTED NONE PRSNT: CPT | Performed by: INTERNAL MEDICINE

## 2025-07-22 PROCEDURE — G2211 COMPLEX E/M VISIT ADD ON: HCPCS | Performed by: INTERNAL MEDICINE

## 2025-07-22 NOTE — PROGRESS NOTES
"      Chief Complaint  discuss medications (Vit d and calcium/Would citracal cover both), difficulty swallowing  (Hurts to swallow- not every time- seems to happen more in the evening when she is eating her avocado toast ), pneumo vaccine, and Earache (Left ear switches between pressure and pain )    Subjective        Ricarda Hatch presents to [facility]    HPI    Patient here for the above problems.  See Assessment and Plan for further HPI components.      Review of Systems    Objective   Vital Signs:  /68 (BP Location: Left arm, Patient Position: Sitting, Cuff Size: Adult)   Pulse 76   Temp 97.8 °F (36.6 °C) (Temporal)   Ht 157.5 cm (62.01\")   Wt 54 kg (119 lb)   SpO2 96%   BMI 21.76 kg/m²   Estimated body mass index is 21.76 kg/m² as calculated from the following:    Height as of this encounter: 157.5 cm (62.01\").    Weight as of this encounter: 54 kg (119 lb).      Physical Exam  Vitals and nursing note reviewed.   Constitutional:       Appearance: She is not ill-appearing.   HENT:      Right Ear: Tympanic membrane and ear canal normal.      Left Ear: Tympanic membrane and ear canal normal.   Eyes:      General: No scleral icterus.     Conjunctiva/sclera: Conjunctivae normal.   Pulmonary:      Effort: Pulmonary effort is normal. No respiratory distress.   Skin:     General: Skin is warm.      Coloration: Skin is not pale.   Neurological:      General: No focal deficit present.      Mental Status: She is alert and oriented to person, place, and time.   Psychiatric:         Mood and Affect: Mood normal.         Behavior: Behavior normal.                       Assessment and Plan   Diagnoses and all orders for this visit:    1. Postmenopausal HRT (hormone replacement therapy) (Primary)    2. Dysfunction of left eustachian tube    3. Age-related osteoporosis without current pathological fracture    4. Vitamin D deficiency    5. Movement disorder    6. Allergic rhinitis, " unspecified seasonality, unspecified trigger    7. Oropharyngeal dysphagia  -     FL Video Swallow With Speech Single Contrast; Future        History of Present Illness  The patient returns today for difficulty swallowing.    She reports experiencing difficulty swallowing certain foods, particularly avocado toast, which she typically consumes at night. She also mentions that stringy meat poses a challenge for her to swallow. However, she has no issues with swallowing liquids such as water or other foods. She has a known hiatal hernia.    She is currently experiencing ear pressure, which occasionally disrupts her sleep. Despite this, she reports no changes in her hearing or any signs of congestion.    She notes that within 45 minutes to an hour of taking her estrogen and progesterone, she would feel unbalanced and extremely drowsy. Consequently, she has discontinued these medications.    She has an upcoming appointment with a neurologist in [place] in a month.    Social History:  Sleep: Reports ear pressure occasionally disrupts sleep      Assessment & Plan  1. Dysphagia. Oropharyngeal  Difficulty swallowing certain foods, such as avocado toast and stringy meats. Presence of a hiatal hernia contributing to acid reflux into the throat and esophagus. Modified barium swallow test ordered to assess for aspiration and esophageal muscle function. Advised to avoid foods that exacerbate symptoms.  Lets get a modified barium swallow to evaluate    2. Ear pressure. Eustachian Tube dysfunction. Allergic rhinitis.  No signs of infection; no pus present.  No fluid.  Long history of allergic rhinitis. Likely due to weather changes affecting the eustachian tube. Instructed to hold nose and swallow to alleviate pressure. Advised to distract herself if the pressure persists.  Recommend the patient utilize her azelastin and flonase.      3. Hormone replacement therapy.  Post menopausal  Discontinued estrogen and progesterone therapy  due to imbalance and drowsiness. May experience hot flashes, which should resolve over time.  Discontinue HRT.    4. Age related osteoporosis. Vitamin D deficiency  Okay to switch to citral vitamin D and calcium replacement.     5. Involuntary movements did not cover her home health.  Switched diagnosis to movement disorder to see if this would satisfy insurance.  Workup ongoing.  I have not witnessed any of these said movements.  She sees a movement disorder clinic in [place] next month, this was arranged by [facility] neurology.    Result Review :           BMI is within normal parameters. No other follow-up for BMI required.      BMI is within normal parameters. No other follow-up for BMI required.            Follow Up   Return in about 2 months (around 9/22/2025), or if symptoms worsen or fail to improve, for follow up for above problems. Longitudinal care..  Patient was given instructions and counseling regarding her condition or for health maintenance advice. Please see specific information pulled into the AVS if appropriate.       OPAL Valentin MD, FACP, FHM      Electronically signed by Sergio Valentin MD, 07/22/25, 8:07 AM CDT.    Patient or patient representative verbalized consent for the use of Ambient Listening during the visit with  Sergio Valentin MD for chart documentation. 7/22/2025  08:11 CDT

## 2025-07-30 ENCOUNTER — APPOINTMENT (OUTPATIENT)
Dept: GENERAL RADIOLOGY | Facility: HOSPITAL | Age: 85
End: 2025-07-30
Payer: MEDICARE

## 2025-07-30 PROCEDURE — 73630 X-RAY EXAM OF FOOT: CPT
